# Patient Record
Sex: FEMALE | Race: ASIAN | NOT HISPANIC OR LATINO | Employment: FULL TIME | URBAN - METROPOLITAN AREA
[De-identification: names, ages, dates, MRNs, and addresses within clinical notes are randomized per-mention and may not be internally consistent; named-entity substitution may affect disease eponyms.]

---

## 2017-01-05 ENCOUNTER — OFFICE VISIT (OUTPATIENT)
Dept: MEDICAL GROUP | Facility: PHYSICIAN GROUP | Age: 47
End: 2017-01-05
Payer: COMMERCIAL

## 2017-01-05 VITALS
OXYGEN SATURATION: 96 % | TEMPERATURE: 98.8 F | SYSTOLIC BLOOD PRESSURE: 108 MMHG | RESPIRATION RATE: 14 BRPM | WEIGHT: 189 LBS | HEIGHT: 66 IN | BODY MASS INDEX: 30.37 KG/M2 | HEART RATE: 80 BPM | DIASTOLIC BLOOD PRESSURE: 64 MMHG

## 2017-01-05 DIAGNOSIS — H66.003 ACUTE SUPPURATIVE OTITIS MEDIA OF BOTH EARS WITHOUT SPONTANEOUS RUPTURE OF TYMPANIC MEMBRANES, RECURRENCE NOT SPECIFIED: ICD-10-CM

## 2017-01-05 LAB
FLUAV+FLUBV AG SPEC QL IA: NEGATIVE
INT CON NEG: NEGATIVE
INT CON POS: NEGATIVE

## 2017-01-05 PROCEDURE — 87804 INFLUENZA ASSAY W/OPTIC: CPT | Performed by: NURSE PRACTITIONER

## 2017-01-05 PROCEDURE — 99214 OFFICE O/P EST MOD 30 MIN: CPT | Performed by: NURSE PRACTITIONER

## 2017-01-05 RX ORDER — BENZONATATE 100 MG/1
100 CAPSULE ORAL 3 TIMES DAILY PRN
Qty: 60 CAP | Refills: 0 | Status: SHIPPED | OUTPATIENT
Start: 2017-01-05 | End: 2017-03-29

## 2017-01-05 RX ORDER — CEFDINIR 300 MG/1
300 CAPSULE ORAL 2 TIMES DAILY
Qty: 20 CAP | Refills: 0 | Status: SHIPPED | OUTPATIENT
Start: 2017-01-05 | End: 2017-03-29

## 2017-01-05 ASSESSMENT — ENCOUNTER SYMPTOMS
HEARTBURN: 0
HEMOPTYSIS: 0
NECK PAIN: 0
ABDOMINAL PAIN: 0
CHILLS: 1
DEPRESSION: 0
NAUSEA: 0
ORTHOPNEA: 0
WHEEZING: 0
SWEATS: 1
COUGH: 1
DIAPHORESIS: 0
DIZZINESS: 0
LOSS OF CONSCIOUSNESS: 0
MYALGIAS: 1
FEVER: 1
HEADACHES: 0
SHORTNESS OF BREATH: 1
SPUTUM PRODUCTION: 1
RHINORRHEA: 1
BACK PAIN: 0
STRIDOR: 0
WEAKNESS: 0
CONSTIPATION: 0
VOMITING: 0
TINGLING: 0
PALPITATIONS: 0
DIARRHEA: 0
WEIGHT LOSS: 0
SORE THROAT: 1
BLURRED VISION: 0
TREMORS: 0

## 2017-01-05 ASSESSMENT — COPD QUESTIONNAIRES: COPD: 0

## 2017-01-05 NOTE — MR AVS SNAPSHOT
"        Nikki Aponte   2017 9:20 AM   Office Visit   MRN: [id number]    Department:  [facility]   Dept Phone:  [phone]    Description:  Female : 1970   Provider:  SAVANAH Malagon           Reason for Visit     Cough x 4 days     Otalgia x 2 days       Allergies as of 2017     Allergen Noted Reactions    Codeine 2010   Itching    Penicillins 2014       States that it does not treat anything when she has been on it.       You were diagnosed with     Acute suppurative otitis media of both ears without spontaneous rupture of tympanic membranes, recurrence not specified   [[id number]]         Vital Signs     Blood Pressure Pulse Temperature Respirations Height Weight    108/64 mmHg 80 37.1 °C (98.8 °F) 14 1.676 m (5' 6\") 85.73 kg (189 lb)    Body Mass Index Oxygen Saturation Last Menstrual Period Breastfeeding? Smoking Status       30.52 kg/m2 96% 2016 No Never Smoker        Basic Information     Date Of Birth Sex Race Ethnicity Preferred Language    1970 Female  Non- English      Your appointments     2017  3:20 PM   Established Patient with Ruddy Mansfield M.D.   [facility] (--)    [address]  [address]  [facility] [address] [phone]           You will be receiving a confirmation call a few days before your appointment from our automated call confirmation system.              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.11, D05.12   5/10/2016 - Present    S/P radiation therapy -  [facility] (Chronic) Z92.3   Unknown - Present    History of palpitations in " adulthood Z86.79   12/22/2016 - Present    Bilateral acute suppurative otitis media H66.003   1/5/2017 - Present      Health Maintenance        Date Due Completion Dates    MAMMOGRAM 7/8/2017 7/8/2016, 5/10/2016, 5/10/2016, 4/14/2016, 3/31/2016, 1/10/2014, 1/9/2014, 1/9/2014    PAP SMEAR 3/16/2018 3/16/2015 (Postponed)    Override on 3/16/2015: Postponed    IMM DTaP/Tdap/Td Vaccine (2 - Td) 12/22/2026 12/22/2016            Current Immunizations     Influenza Vaccine Quad Inj (Pf) 12/22/2016    Tdap Vaccine 12/22/2016      Below and/or attached are the medications your provider expects you to take. Review all of your home medications and newly ordered medications with your provider and/or pharmacist. Follow medication instructions as directed by your provider and/or pharmacist. Please keep your medication list with you and share with your provider. Update the information when medications are discontinued, doses are changed, or new medications (including over-the-counter products) are added; and carry medication information at all times in the event of emergency situations     Allergies:  CODEINE - Itching     PENICILLINS - (reactions not documented)               Medications  Valid as of: January 05, 2017 -  9:51 AM    Generic Name Brand Name Tablet Size Instructions for use    Albuterol Sulfate (Aero Soln) albuterol 108 (90 BASE) MCG/ACT Inhale 2 Puffs by mouth every 6 hours as needed for Shortness of Breath.        Benzonatate (Cap) TESSALON 100 MG Take 1 Cap by mouth 3 times a day as needed for Cough.        Cefdinir (Cap) OMNICEF 300 MG Take 1 Cap by mouth 2 times a day.        Copper (IUD) PARAGARD INTRAUTERINE COPPER  by Intrauterine route.        DiphenhydrAMINE HCl (Tab) BENADRYL 25 MG Take 25 mg by mouth as needed for Sleep.        Fluticasone Propionate (Suspension) FLONASE 50 MCG/ACT Spray 2 Sprays in nose every day.        Levothyroxine Sodium (Tab) SYNTHROID 100 MCG Take 1 Tab by mouth Every morning on  an empty stomach.        Sertraline HCl (Tab) ZOLOFT 100 MG Take 1 Tab by mouth every morning.        .                 Medicines prescribed today were sent to:     [facility][id number] - [place] - [address] [place] [address]    Phone: [phone] Fax: [phone]    Open 24 Hours?: No      Medication refill instructions:       If your prescription bottle indicates you have medication refills left, it is not necessary to call your provider’s office. Please contact your pharmacy and they will refill your medication.    If your prescription bottle indicates you do not have any refills left, you may request refills at any time through one of the following ways: The online [employer] system (except Urgent Care), by calling your provider’s office, or by asking your pharmacy to contact your provider’s office with a refill request. Medication refills are processed only during regular business hours and may not be available until the next business day. Your provider may request additional information or to have a follow-up visit with you prior to refilling your medication.   *Please Note: Medication refills are assigned a new Rx number when refilled electronically. Your pharmacy may indicate that no refills were authorized even though a new prescription for the same medication is available at the pharmacy. Please request the medicine by name with the pharmacy before contacting your provider for a refill.        Referral     A referral request has been sent to our patient care coordination department. Please allow 3-5 business days for us to process this request and contact you either by phone or mail. If you do not hear from us by the 5th business day, please call us at [phone].           [employer] Access Code: Activation code not generated  Current [employer] Status: Active

## 2017-01-09 ENCOUNTER — OFFICE VISIT (OUTPATIENT)
Dept: MEDICAL GROUP | Facility: PHYSICIAN GROUP | Age: 47
End: 2017-01-09
Payer: COMMERCIAL

## 2017-01-09 VITALS
HEART RATE: 83 BPM | HEIGHT: 66 IN | SYSTOLIC BLOOD PRESSURE: 142 MMHG | BODY MASS INDEX: 30.22 KG/M2 | RESPIRATION RATE: 16 BRPM | TEMPERATURE: 97.8 F | DIASTOLIC BLOOD PRESSURE: 80 MMHG | WEIGHT: 188 LBS | OXYGEN SATURATION: 93 %

## 2017-01-09 DIAGNOSIS — H66.003 ACUTE SUPPURATIVE OTITIS MEDIA OF BOTH EARS WITHOUT SPONTANEOUS RUPTURE OF TYMPANIC MEMBRANES, RECURRENCE NOT SPECIFIED: ICD-10-CM

## 2017-01-09 PROCEDURE — 99214 OFFICE O/P EST MOD 30 MIN: CPT | Performed by: NURSE PRACTITIONER

## 2017-01-09 NOTE — ASSESSMENT & PLAN NOTE
Improving. Patient states that she is still running a low-grade temperature. Patient states that her ear pain is improved. Patient states that her throat is feeling better at this time. Patient states that she has recently started coughing up some green mucus. Patient does state that she still feels slightly short of breath at times. Patient is taking over-the-counter cough medication. States Tessalon Perles are not working to relieve her cough. Patient is taking Mucinex over-the-counter at this time. Patient has used Flonase twice. Patient counseled the Flonase to use every day to achieve adequate therapeutic effect. She is encouraged to continue using humidifier at night. Rapid influenza is negative at this time. Patient encouraged to continue using albuterol HFA. Patient encouraged to drink plenty of fluids and get a lot of extra rest.

## 2017-01-09 NOTE — PROGRESS NOTES
Chief Complaint   Patient presents with   • Cough     x 1 week    • Fever     x 1 week        HISTORY OF PRESENT ILLNESS: Patient is a 46 y.o. female established patient who presents today to discuss cough.    Bilateral acute suppurative otitis media  Improving. Patient states that she is still running a low-grade temperature. Patient states that her ear pain is improved. Patient states that her throat is feeling better at this time. Patient states that she has recently started coughing up some green mucus. Patient does state that she still feels slightly short of breath at times. Patient is taking over-the-counter cough medication. States Tessalon Perles are not working to relieve her cough. Patient is taking Mucinex over-the-counter at this time. Patient has used Flonase twice. Patient counseled the Flonase to use every day to achieve adequate therapeutic effect. She is encouraged to continue using humidifier at night. Rapid influenza is negative at this time. Patient encouraged to continue using albuterol HFA. Patient encouraged to drink plenty of fluids and get a lot of extra rest.       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 60 mL 0   • cefdinir (OMNICEF) 300 MG Cap Take 1 Cap by mouth 2 times a day. 20 Cap 0   • benzonatate (TESSALON PERLES) 100 MG Cap Take 1 Cap by mouth 3 times  a day as needed for Cough. 60 Cap 0   • sertraline (ZOLOFT) 100 MG Tab Take 1 Tab by mouth every morning. 90 Tab 3   • fluticasone (FLONASE) 50 MCG/ACT nasal spray Spray 2 Sprays in nose every day. 16 g 3   • levothyroxine (SYNTHROID) 100 MCG Tab Take 1 Tab by mouth Every morning on an empty stomach. 90 Tab 1   • albuterol (VENTOLIN OR PROVENTIL) 108 (90 BASE) MCG/ACT AERS Inhale 2 Puffs by mouth every 6 hours as needed for Shortness of Breath. 8.5 g 3   • PARAGARD INTRAUTERINE COPPER IUD by Intrauterine route.     • diphenhydrAMINE (BENADRYL) 25 MG Tab Take 25 mg by mouth as needed for Sleep.       No current facility-administered medications for this visit.       Social History   Substance Use Topics   • Smoking status: Never Smoker    • Smokeless tobacco: Never Used   • Alcohol Use: 0.0 oz/week     0 Standard drinks or equivalent per week      Comment: rare social       No family status information on file.     Family History   Problem Relation Age of Onset   • Stroke Mother    • Hypertension Mother    • Stroke Father    • Hypertension Father    • Cancer Father      prostate cancer   • Diabetes Neg Hx        Review of Systems:   Constitutional:  Positive for fever, chills, negative weight loss and malaise/fatigue.   HEENT:  Positive for ear pain, congestion, sore throat.  Negative  nosebleeds, and neck pain.  Eyes:  Negative for blurred vision.   Respiratory:Positive for cough, sputum production, shortness of breath and wheezing.    Cardiovascular:  Negative for chest pain, palpitations, orthopnea and leg swelling.   Gastrointestinal:  Negative for heartburn, nausea, vomiting and abdominal pain.   Genitourinary:  Negative for dysuria, urgency and frequency.   Musculoskeletal:  Negative for myalgias, back pain and joint pain.   Skin:  Negative for rash and itching.   Neurological:  Negative for dizziness, tingling, tremors, sensory change, focal weakness and headaches.   Endo/Heme/Allergies:  Does not bruise/bleed  "easily.   Psychiatric/Behavioral:  Negative for depression, suicidal ideas and memory loss.  The patient is not nervous/anxious and does not have insomnia.    All other systems reviewed and are negative except as in HPI.    Exam:  Blood pressure 142/80, pulse 83, temperature 36.6 °C (97.8 °F), resp. rate 16, height 1.676 m (5' 6\"), weight 85.276 kg (188 lb), last menstrual period 11/05/2016, SpO2 93 %, not currently breastfeeding.  General:  Normal appearing. No distress.  HEENT:  Normocephalic. Eyes conjunctiva clear lids without ptosis, pupils equal and reactive to light accommodation, ears normal shape and contour, canals are clear bilaterally, tympanic membrane erythematous on right, benign (improved) on left, bilateral effusion present, but not bulging, nasal mucosa erythematous, oropharynx is erythematous without edema or exudates positive cobblestoning.  Neck:  Supple without JVD or bruit. Thyroid is not enlarged.  Pulmonary: Positive wheeze right lower lobe.  Clear to auscultation all other lung fields.Normal effort. No rales, ronchi.  Cardiovascular:  Regular rate and rhythm without murmur. Carotid and radial pulses are intact and equal bilaterally.  Abdomen:  Soft, nontender, nondistended. Normal bowel sounds. Liver and spleen are not palpable.  Neurologic:  Grossly nonfocal  Lymph:  No cervical, supraclavicular or axillary lymph nodes are palpable  Skin:  Warm and dry.  No obvious lesions.  Musculoskeletal:  Normal gait. No extremity cyanosis, clubbing, or edema.  Psych:  Normal mood and affect. Alert and oriented x3. Judgment and insight is normal.      Medical decision-making and discussion: Nikki is a generally well-appearing 38-year-old female patient here today to follow-up as she states her cough is getting worse and not improving. Patient is positive for wheezing on assessment today encouraged to continue use of albuterol inhaler. Ear infection is improving patient encouraged to complete Omnicef. " Patient states the Tessalon Perles are not improving her cough as such, hydrocodone-chlorpheniramine cough syrup provided at this time. Patient encouraged to continue over-the-counter Mucinex, Flonase. Fish encouraged to drink plenty of fluids a healthy diet and use humidifier at night. Patient instructed to use Tylenol or ibuprofen over-the-counter for fever or pain. Patient will follow up if symptoms worsen or do not improve.    Please note that this dictation was created using voice recognition software. I have made every reasonable attempt to correct obvious errors, but I expect that there are errors of grammar and possibly content that I did not discover before finalizing the note.    Assessment/Plan:  1. Acute suppurative otitis media of both ears without spontaneous rupture of tympanic membranes, recurrence not specified            I have placed the below orders and discussed them with an approved delegating provider. The MA is performing the below orders under the direction of Dr. Donahue.

## 2017-02-01 ENCOUNTER — OFFICE VISIT (OUTPATIENT)
Dept: MEDICAL GROUP | Facility: PHYSICIAN GROUP | Age: 47
End: 2017-02-01
Payer: COMMERCIAL

## 2017-02-01 ENCOUNTER — HOSPITAL ENCOUNTER (OUTPATIENT)
Dept: LAB | Facility: MEDICAL CENTER | Age: 47
End: 2017-02-01
Attending: FAMILY MEDICINE
Payer: COMMERCIAL

## 2017-02-01 VITALS
HEIGHT: 66 IN | HEART RATE: 88 BPM | SYSTOLIC BLOOD PRESSURE: 130 MMHG | BODY MASS INDEX: 31.02 KG/M2 | TEMPERATURE: 98.6 F | WEIGHT: 193 LBS | DIASTOLIC BLOOD PRESSURE: 68 MMHG | OXYGEN SATURATION: 97 % | RESPIRATION RATE: 16 BRPM

## 2017-02-01 DIAGNOSIS — R55 PRE-SYNCOPE: ICD-10-CM

## 2017-02-01 DIAGNOSIS — G43.109 MIGRAINE WITH AURA AND WITHOUT STATUS MIGRAINOSUS, NOT INTRACTABLE: ICD-10-CM

## 2017-02-01 DIAGNOSIS — E03.9 HYPOTHYROIDISM, UNSPECIFIED TYPE: ICD-10-CM

## 2017-02-01 LAB
ALBUMIN SERPL BCP-MCNC: 4.6 G/DL (ref 3.2–4.9)
ALBUMIN/GLOB SERPL: 1.6 G/DL
ALP SERPL-CCNC: 68 U/L (ref 30–99)
ALT SERPL-CCNC: 14 U/L (ref 2–50)
ANION GAP SERPL CALC-SCNC: 10 MMOL/L (ref 0–11.9)
AST SERPL-CCNC: 20 U/L (ref 12–45)
BASOPHILS # BLD AUTO: 0.08 K/UL (ref 0–0.12)
BASOPHILS NFR BLD AUTO: 0.9 % (ref 0–1.8)
BILIRUB SERPL-MCNC: 0.4 MG/DL (ref 0.1–1.5)
BUN SERPL-MCNC: 12 MG/DL (ref 8–22)
CALCIUM SERPL-MCNC: 8.8 MG/DL (ref 8.5–10.5)
CHLORIDE SERPL-SCNC: 104 MMOL/L (ref 96–112)
CO2 SERPL-SCNC: 24 MMOL/L (ref 20–33)
CREAT SERPL-MCNC: 0.58 MG/DL (ref 0.5–1.4)
EOSINOPHIL # BLD: 0.37 K/UL (ref 0–0.51)
EOSINOPHIL NFR BLD AUTO: 4.3 % (ref 0–6.9)
ERYTHROCYTE [DISTWIDTH] IN BLOOD BY AUTOMATED COUNT: 44.8 FL (ref 35.9–50)
GLOBULIN SER CALC-MCNC: 2.9 G/DL (ref 1.9–3.5)
GLUCOSE SERPL-MCNC: 83 MG/DL (ref 65–99)
HCT VFR BLD AUTO: 43 % (ref 37–47)
HGB BLD-MCNC: 14 G/DL (ref 12–16)
IMM GRANULOCYTES # BLD AUTO: 0.07 K/UL (ref 0–0.11)
IMM GRANULOCYTES NFR BLD AUTO: 0.8 % (ref 0–0.9)
LYMPHOCYTES # BLD: 2.01 K/UL (ref 1–4.8)
LYMPHOCYTES NFR BLD AUTO: 23.3 % (ref 22–41)
MCH RBC QN AUTO: 30.2 PG (ref 27–33)
MCHC RBC AUTO-ENTMCNC: 32.6 G/DL (ref 33.6–35)
MCV RBC AUTO: 92.7 FL (ref 81.4–97.8)
MONOCYTES # BLD: 0.69 K/UL (ref 0–0.85)
MONOCYTES NFR BLD AUTO: 8 % (ref 0–13.4)
NEUTROPHILS # BLD: 5.4 K/UL (ref 2–7.15)
NEUTROPHILS NFR BLD AUTO: 62.7 % (ref 44–72)
NRBC # BLD AUTO: 0 K/UL
NRBC BLD-RTO: 0 /100 WBC
PLATELET # BLD AUTO: 262 K/UL (ref 164–446)
PMV BLD AUTO: 9.2 FL (ref 9–12.9)
POTASSIUM SERPL-SCNC: 4.1 MMOL/L (ref 3.6–5.5)
PROT SERPL-MCNC: 7.5 G/DL (ref 6–8.2)
RBC # BLD AUTO: 4.64 M/UL (ref 4.2–5.4)
SODIUM SERPL-SCNC: 138 MMOL/L (ref 135–145)
T4 FREE SERPL-MCNC: 0.92 NG/DL (ref 0.53–1.43)
TSH SERPL DL<=0.005 MIU/L-ACNC: 0.77 UIU/ML (ref 0.3–3.7)
WBC # BLD AUTO: 8.6 K/UL (ref 4.8–10.8)

## 2017-02-01 PROCEDURE — 99214 OFFICE O/P EST MOD 30 MIN: CPT | Performed by: FAMILY MEDICINE

## 2017-02-01 PROCEDURE — 84443 ASSAY THYROID STIM HORMONE: CPT

## 2017-02-01 PROCEDURE — 36415 COLL VENOUS BLD VENIPUNCTURE: CPT

## 2017-02-01 PROCEDURE — 85025 COMPLETE CBC W/AUTO DIFF WBC: CPT

## 2017-02-01 PROCEDURE — 80053 COMPREHEN METABOLIC PANEL: CPT

## 2017-02-01 PROCEDURE — 84439 ASSAY OF FREE THYROXINE: CPT

## 2017-02-01 ASSESSMENT — ENCOUNTER SYMPTOMS: HEADACHES: 1

## 2017-02-01 NOTE — PROGRESS NOTES
Subjective:      Nikki Aponte is a 46 y.o. female who presents with Headache            Headache       This is a 46-year-old female previously under the care of Dr. Mcqueen and then by Dr. Mansfield.    She is here because she had 2 episodes of migraine headache that happened while working out 2 months ago and then yesterday. She was doing high impact workout with jumping both times. She said she started having neck pain and then she developed flashes of light and pain around her sinuses in the jaw. These are the same symptoms that she had when she had migraine episodes in 2009. At that time the migraine headaches were coming on a daily basis for 3 weeks. She said she was seen and evaluated by neurologist Dr. Koehler at the [facility]. She said she was given a medication but she couldn't remember the name. The headache did not recur until recently. The only difference this time from the headache that she had before was the feeling that she is going to faint. She did not use consciousness completely. The first episode 2 months ago lasted about 15 minutes and yesterday lasted about 30 minutes. She did not take any medication. In the past when she had migraine headache also having slurring of speech which did not happen this time.    I reviewed medical records and she was recently evaluated by the cardiologist for shortness of breath and underwent treadmill stress test which came back normal.     She however has history of mild to moderate aortic insufficiency with the last echocardiogram in 9/14.    She recently was diagnosed with right breast cancer in May 2016 status post partial mastectomy and radiation treatment.    She has hypothyroidism and her levothyroxine dose was adjusted and was decreased about 6 weeks ago because she was slightly over replaced.    Past medical history, past surgical history, family history reviewed-no changes    Social history reviewed-no changes    Allergies reviewed-no  "changes    Medications reviewed-no changes     Review of Systems   Neurological: Positive for headaches.    additionally as per history of present illness, the rest are negative.       Objective:     /68 mmHg  Pulse 88  Temp(Src) 37 °C (98.6 °F)  Resp 16  Ht 1.676 m (5' 6\")  Wt 87.544 kg (193 lb)  BMI 31.17 kg/m2  SpO2 97%  LMP 11/05/2016     Physical Exam     Examined alert, awake, oriented, not in distress    Neck-supple, no lymphadenopathy, no thyromegaly, no bruits, positive lesion of the systolic murmur in the left carotid  Lungs-clear to auscultation, no rales, no wheezes  Heart-regular rate and rhythm, positive systolic murmur grade 2/6 left sternal border  Extremities-no edema, clubbing, cyanosis  Neuro exam-neurologically intact            Assessment/Plan:     1. Migraine with aura and without status migrainosus, not intractable  She had 2 episodes of migraine headache 2 months ago and then yesterday. She does not want to take medication at this time since the 2 episodes were far apart. If there is increase in the frequency we will institute treatment for abortive treatment and possibly prophylactic treatment.  - COMP METABOLIC PANEL; Future  - TSH; Future  - FREE THYROXINE; Future  - CBC WITH DIFFERENTIAL; Future    2. Pre-syncope  She has a syncope associated with a migraine episodes. I will get carotid ultrasound, and because of her history of aortic insufficiency I will get an updated echocardiogram. I will also get a CAT scan of the head without contrast. I will also check CBC, CMP to rule out metabolic or hematologic problems. We will follow-up her thyroid function tests because her levothyroxine dose was just adjusted 6 weeks ago.  - US-CAROTID DOPPLER; Future  - ECHOCARDIOGRAM COMP W/O CONT; Future  - COMP METABOLIC PANEL; Future  - TSH; Future  - FREE THYROXINE; Future  - CBC WITH DIFFERENTIAL; Future  - CT-HEAD W/O; Future    3. Hypothyroidism, unspecified type  We will get a " follow-up thyroid function test to see if this is already adequately replaced.  - COMP METABOLIC PANEL; Future  - TSH; Future  - FREE THYROXINE; Future  - CBC WITH DIFFERENTIAL; Future        Please note that this dictation was created using voice recognition software. I have worked with consultants from the vendor as well as technical experts from [facility] to optimize the interface. I have made every reasonable attempt to correct obvious errors, but I expect that there are errors of grammar and possibly content I did not discover before finalizing the note.

## 2017-02-01 NOTE — MR AVS SNAPSHOT
"        Nikki Aponte   2017 9:40 AM   Office Visit   MRN: [id number]    Department:  [facility]   Dept Phone:  [phone]    Description:  Female : 1970   Provider:  Natalie Donahue M.D.           Reason for Visit     Headache really bad and last 30 min      Allergies as of 2017     Allergen Noted Reactions    Codeine 2010   Itching    Penicillins 2014       States that it does not treat anything when she has been on it.       You were diagnosed with     Migraine with aura and without status migrainosus, not intractable   [521875]       Pre-syncope   [149798]       Hypothyroidism, unspecified type   [9528635]         Vital Signs     Blood Pressure Pulse Temperature Respirations Height Weight    130/68 mmHg 88 37 °C (98.6 °F) 16 1.676 m (5' 6\") 87.544 kg (193 lb)    Body Mass Index Oxygen Saturation Last Menstrual Period Smoking Status          31.17 kg/m2 97% 2016 Never Smoker         Basic Information     Date Of Birth Sex Race Ethnicity Preferred Language    1970 Female  Non- English      Your appointments     May 04, 2017  2:40 PM   Established Patient with Natalie Donahue M.D.   [facility] [employer] (--)    [email or web address]  [address]  [facility] [address] [phone]           You will be receiving a confirmation call a few days before your appointment from our automated call confirmation system.            2017  3:20 PM   Established Patient with Ruddy Mansfield M.D.   [facility] [employer] (--)    [email or web address]  [address]  [place] [address] [phone]           You will be receiving a confirmation call a few days before your appointment from our automated call confirmation system.              Problem List              ICD-10-CM Priority Class Noted - Resolved    Hypothyroidism E03.9   2013 - Present    Depression F32.9   2013 - Present    History of Hodgkin's disease Z85.71   2013 - Present    Nonrheumatic aortic valve " insufficiency I35.1   5/8/2014 - Present    Allergic rhinitis due to allergen J30.9   5/8/2014 - Present    Bilateral ductal carcinoma in situ of breasts D05.11, D05.12   5/10/2016 - Present    S/P radiation therapy - 1990 Mantle Radiation (Chronic) Z92.3   Unknown - Present    History of palpitations in adulthood Z86.79   12/22/2016 - Present    Bilateral acute suppurative otitis media H66.003   1/5/2017 - Present      Health Maintenance        Date Due Completion Dates    MAMMOGRAM 4/14/2017 4/14/2016, 3/31/2016, 1/10/2014, 1/9/2014, 1/9/2014    PAP SMEAR 3/16/2018 3/16/2015 (Postponed)    Override on 3/16/2015: Postponed    IMM DTaP/Tdap/Td Vaccine (2 - Td) 12/22/2026 12/22/2016            Current Immunizations     Influenza Vaccine Quad Inj (Pf) 12/22/2016    Tdap Vaccine 12/22/2016      Below and/or attached are the medications your provider expects you to take. Review all of your home medications and newly ordered medications with your provider and/or pharmacist. Follow medication instructions as directed by your provider and/or pharmacist. Please keep your medication list with you and share with your provider. Update the information when medications are discontinued, doses are changed, or new medications (including over-the-counter products) are added; and carry medication information at all times in the event of emergency situations     Allergies:  CODEINE - Itching     PENICILLINS - (reactions not documented)               Medications  Valid as of: February 01, 2017 - 10:12 AM    Generic Name Brand Name Tablet Size Instructions for use    Albuterol Sulfate (Aero Soln) albuterol 108 (90 BASE) MCG/ACT Inhale 2 Puffs by mouth every 6 hours as needed for Shortness of Breath.        Benzonatate (Cap) TESSALON 100 MG Take 1 Cap by mouth 3 times a day as needed for Cough.        Cefdinir (Cap) OMNICEF 300 MG Take 1 Cap by mouth 2 times a day.        Copper (IUD) PARAGARD INTRAUTERINE COPPER  by Intrauterine route.         DiphenhydrAMINE HCl (Tab) BENADRYL 25 MG Take 25 mg by mouth as needed for Sleep.        Fluticasone Propionate (Suspension) FLONASE 50 MCG/ACT Spray 2 Sprays in nose every day.        Hydrocodone-Chlorpheniramine (Solution) Hydrocodone-Chlorpheniramine 5-4 MG/5ML Take 5 mL by mouth 2 times a day as needed.        Levothyroxine Sodium (Tab) SYNTHROID 100 MCG Take 1 Tab by mouth Every morning on an empty stomach.        Sertraline HCl (Tab) ZOLOFT 100 MG Take 1 Tab by mouth every morning.        .                 Medicines prescribed today were sent to:     [facility] #[id number] - [place], [place] - [address] [place] [address]    Phone: [phone] Fax: [phone]    Open 24 Hours?: No      Medication refill instructions:       If your prescription bottle indicates you have medication refills left, it is not necessary to call your provider’s office. Please contact your pharmacy and they will refill your medication.    If your prescription bottle indicates you do not have any refills left, you may request refills at any time through one of the following ways: The online [employer] system (except Urgent Care), by calling your provider’s office, or by asking your pharmacy to contact your provider’s office with a refill request. Medication refills are processed only during regular business hours and may not be available until the next business day. Your provider may request additional information or to have a follow-up visit with you prior to refilling your medication.   *Please Note: Medication refills are assigned a new Rx number when refilled electronically. Your pharmacy may indicate that no refills were authorized even though a new prescription for the same medication is available at the pharmacy. Please request the medicine by name with the pharmacy before contacting your provider for a refill.        Your To Do List     Future Labs/Procedures Complete By Expires    CBC WITH DIFFERENTIAL  As directed  2/2/2018    COMP METABOLIC PANEL  As directed 2/2/2018    CT-HEAD W/O  As directed 2/1/2018    ECHOCARDIOGRAM COMP W/O CONT  As directed 2/1/2018    Scheduling Instructions:    Presyncope and follow up aortic insuff    FREE THYROXINE  As directed 2/2/2018    TSH  As directed 2/2/2018    US-CAROTID DOPPLER  As directed 2/1/2018         MyChart Access Code: Activation code not generated  Current MyChart Status: Active

## 2017-02-21 ENCOUNTER — HOSPITAL ENCOUNTER (OUTPATIENT)
Dept: CARDIOLOGY | Facility: MEDICAL CENTER | Age: 47
End: 2017-02-21
Attending: FAMILY MEDICINE | Admitting: RADIOLOGY
Payer: COMMERCIAL

## 2017-02-21 ENCOUNTER — HOSPITAL ENCOUNTER (OUTPATIENT)
Dept: RADIOLOGY | Facility: MEDICAL CENTER | Age: 47
End: 2017-02-21
Attending: FAMILY MEDICINE
Payer: COMMERCIAL

## 2017-02-21 DIAGNOSIS — I35.1 MODERATE AORTIC INSUFFICIENCY: ICD-10-CM

## 2017-02-21 DIAGNOSIS — R55 PRE-SYNCOPE: ICD-10-CM

## 2017-02-21 LAB
LV EJECT FRACT MOD 2C 99903: 62.07
LV EJECT FRACT MOD 4C 99902: 56.42
LV EJECT FRACT MOD BP 99901: 58.99

## 2017-02-21 PROCEDURE — 93306 TTE W/DOPPLER COMPLETE: CPT | Mod: 26 | Performed by: INTERNAL MEDICINE

## 2017-02-21 PROCEDURE — 93306 TTE W/DOPPLER COMPLETE: CPT

## 2017-02-21 PROCEDURE — 70450 CT HEAD/BRAIN W/O DYE: CPT

## 2017-02-23 ENCOUNTER — HOSPITAL ENCOUNTER (OUTPATIENT)
Dept: RADIOLOGY | Facility: MEDICAL CENTER | Age: 47
End: 2017-02-23
Attending: FAMILY MEDICINE
Payer: COMMERCIAL

## 2017-02-23 DIAGNOSIS — R55 PRE-SYNCOPE: ICD-10-CM

## 2017-02-23 PROCEDURE — 93880 EXTRACRANIAL BILAT STUDY: CPT

## 2017-02-26 DIAGNOSIS — I65.23 CAROTID ARTERY PLAQUE, BILATERAL: ICD-10-CM

## 2017-03-29 DIAGNOSIS — Z01.810 PRE-OPERATIVE CARDIOVASCULAR EXAMINATION: ICD-10-CM

## 2017-03-29 DIAGNOSIS — Z01.812 PRE-OPERATIVE LABORATORY EXAMINATION: ICD-10-CM

## 2017-03-29 LAB
EKG IMPRESSION: NORMAL
ERYTHROCYTE [DISTWIDTH] IN BLOOD BY AUTOMATED COUNT: 42.1 FL (ref 35.9–50)
HCG SERPL QL: NEGATIVE
HCT VFR BLD AUTO: 42.4 % (ref 37–47)
HGB BLD-MCNC: 14.2 G/DL (ref 12–16)
MCH RBC QN AUTO: 30.4 PG (ref 27–33)
MCHC RBC AUTO-ENTMCNC: 33.5 G/DL (ref 33.6–35)
MCV RBC AUTO: 90.8 FL (ref 81.4–97.8)
PLATELET # BLD AUTO: 227 K/UL (ref 164–446)
PMV BLD AUTO: 9 FL (ref 9–12.9)
RBC # BLD AUTO: 4.67 M/UL (ref 4.2–5.4)
WBC # BLD AUTO: 9.9 K/UL (ref 4.8–10.8)

## 2017-03-29 PROCEDURE — 84703 CHORIONIC GONADOTROPIN ASSAY: CPT

## 2017-03-29 PROCEDURE — 85027 COMPLETE CBC AUTOMATED: CPT

## 2017-03-29 PROCEDURE — 36415 COLL VENOUS BLD VENIPUNCTURE: CPT

## 2017-03-29 NOTE — OR NURSING
Pre admit apt: Pt. Instructed to continue regularly prescribed medications through day before surgery.  Instructed to take the following medications, the day of surgery, with a sip of water per anesthesia protocol:albuterol inhaler

## 2017-03-31 ENCOUNTER — HOSPITAL ENCOUNTER (OUTPATIENT)
Facility: MEDICAL CENTER | Age: 47
End: 2017-03-31
Attending: ORTHOPAEDIC SURGERY | Admitting: ORTHOPAEDIC SURGERY
Payer: COMMERCIAL

## 2017-03-31 VITALS
OXYGEN SATURATION: 95 % | BODY MASS INDEX: 31.38 KG/M2 | WEIGHT: 199.96 LBS | HEIGHT: 67 IN | HEART RATE: 74 BPM | RESPIRATION RATE: 16 BRPM | SYSTOLIC BLOOD PRESSURE: 148 MMHG | TEMPERATURE: 97.2 F | DIASTOLIC BLOOD PRESSURE: 74 MMHG

## 2017-03-31 PROBLEM — G56.02 CARPAL TUNNEL SYNDROME ON LEFT: Status: ACTIVE | Noted: 2017-03-31

## 2017-03-31 LAB
HCG UR QL: NEGATIVE
SP GR UR REFRACTOMETRY: 1.02

## 2017-03-31 PROCEDURE — A9270 NON-COVERED ITEM OR SERVICE: HCPCS | Performed by: ORTHOPAEDIC SURGERY

## 2017-03-31 PROCEDURE — 500440 HCHG DRESSING, STERILE ROLL (KERLIX): Performed by: ORTHOPAEDIC SURGERY

## 2017-03-31 PROCEDURE — 700111 HCHG RX REV CODE 636 W/ 250 OVERRIDE (IP): Performed by: ORTHOPAEDIC SURGERY

## 2017-03-31 PROCEDURE — 700111 HCHG RX REV CODE 636 W/ 250 OVERRIDE (IP)

## 2017-03-31 PROCEDURE — 160048 HCHG OR STATISTICAL LEVEL 1-5: Performed by: ORTHOPAEDIC SURGERY

## 2017-03-31 PROCEDURE — 110371 HCHG SHELL REV 272: Performed by: ORTHOPAEDIC SURGERY

## 2017-03-31 PROCEDURE — 160002 HCHG RECOVERY MINUTES (STAT): Performed by: ORTHOPAEDIC SURGERY

## 2017-03-31 PROCEDURE — 700101 HCHG RX REV CODE 250

## 2017-03-31 PROCEDURE — 500881 HCHG PACK, EXTREMITY: Performed by: ORTHOPAEDIC SURGERY

## 2017-03-31 PROCEDURE — 160009 HCHG ANES TIME/MIN: Performed by: ORTHOPAEDIC SURGERY

## 2017-03-31 PROCEDURE — 81025 URINE PREGNANCY TEST: CPT

## 2017-03-31 PROCEDURE — 500433 HCHG DRESSING, KLING 4': Performed by: ORTHOPAEDIC SURGERY

## 2017-03-31 PROCEDURE — 160035 HCHG PACU - 1ST 60 MINS PHASE I: Performed by: ORTHOPAEDIC SURGERY

## 2017-03-31 PROCEDURE — 160025 RECOVERY II MINUTES (STATS): Performed by: ORTHOPAEDIC SURGERY

## 2017-03-31 PROCEDURE — A6222 GAUZE <=16 IN NO W/SAL W/O B: HCPCS | Performed by: ORTHOPAEDIC SURGERY

## 2017-03-31 PROCEDURE — 501838 HCHG SUTURE GENERAL: Performed by: ORTHOPAEDIC SURGERY

## 2017-03-31 PROCEDURE — 700102 HCHG RX REV CODE 250 W/ 637 OVERRIDE(OP): Performed by: ORTHOPAEDIC SURGERY

## 2017-03-31 PROCEDURE — 160047 HCHG PACU  - EA ADDL 30 MINS PHASE II: Performed by: ORTHOPAEDIC SURGERY

## 2017-03-31 PROCEDURE — 160046 HCHG PACU - 1ST 60 MINS PHASE II: Performed by: ORTHOPAEDIC SURGERY

## 2017-03-31 PROCEDURE — 500053 HCHG BANDAGE, ELASTIC 4: Performed by: ORTHOPAEDIC SURGERY

## 2017-03-31 PROCEDURE — 160028 HCHG SURGERY MINUTES - 1ST 30 MINS LEVEL 3: Performed by: ORTHOPAEDIC SURGERY

## 2017-03-31 PROCEDURE — 500080: Performed by: ORTHOPAEDIC SURGERY

## 2017-03-31 RX ORDER — ONDANSETRON 2 MG/ML
4 INJECTION INTRAMUSCULAR; INTRAVENOUS EVERY 4 HOURS PRN
Status: DISCONTINUED | OUTPATIENT
Start: 2017-03-31 | End: 2017-03-31 | Stop reason: HOSPADM

## 2017-03-31 RX ORDER — LIDOCAINE HYDROCHLORIDE 10 MG/ML
INJECTION, SOLUTION INFILTRATION; PERINEURAL
Status: COMPLETED
Start: 2017-03-31 | End: 2017-03-31

## 2017-03-31 RX ORDER — OXYCODONE HYDROCHLORIDE 5 MG/1
2.5 TABLET ORAL
Status: DISCONTINUED | OUTPATIENT
Start: 2017-03-31 | End: 2017-03-31 | Stop reason: HOSPADM

## 2017-03-31 RX ORDER — DEXAMETHASONE SODIUM PHOSPHATE 4 MG/ML
4 INJECTION, SOLUTION INTRA-ARTICULAR; INTRALESIONAL; INTRAMUSCULAR; INTRAVENOUS; SOFT TISSUE
Status: DISCONTINUED | OUTPATIENT
Start: 2017-03-31 | End: 2017-03-31 | Stop reason: HOSPADM

## 2017-03-31 RX ORDER — DIPHENHYDRAMINE HYDROCHLORIDE 50 MG/ML
25 INJECTION INTRAMUSCULAR; INTRAVENOUS EVERY 6 HOURS PRN
Status: DISCONTINUED | OUTPATIENT
Start: 2017-03-31 | End: 2017-03-31 | Stop reason: HOSPADM

## 2017-03-31 RX ORDER — SODIUM CHLORIDE, SODIUM LACTATE, POTASSIUM CHLORIDE, CALCIUM CHLORIDE 600; 310; 30; 20 MG/100ML; MG/100ML; MG/100ML; MG/100ML
1000 INJECTION, SOLUTION INTRAVENOUS
Status: DISCONTINUED | OUTPATIENT
Start: 2017-03-31 | End: 2017-03-31 | Stop reason: HOSPADM

## 2017-03-31 RX ORDER — HALOPERIDOL 5 MG/ML
1 INJECTION INTRAMUSCULAR EVERY 6 HOURS PRN
Status: DISCONTINUED | OUTPATIENT
Start: 2017-03-31 | End: 2017-03-31 | Stop reason: HOSPADM

## 2017-03-31 RX ORDER — BUPIVACAINE HYDROCHLORIDE 5 MG/ML
INJECTION, SOLUTION EPIDURAL; INTRACAUDAL
Status: DISCONTINUED | OUTPATIENT
Start: 2017-03-31 | End: 2017-03-31 | Stop reason: HOSPADM

## 2017-03-31 RX ADMIN — SODIUM CHLORIDE, POTASSIUM CHLORIDE, SODIUM LACTATE AND CALCIUM CHLORIDE 1000 ML: 600; 310; 30; 20 INJECTION, SOLUTION INTRAVENOUS at 08:40

## 2017-03-31 RX ADMIN — LIDOCAINE HYDROCHLORIDE 0.2 ML: 10 INJECTION, SOLUTION INFILTRATION; PERINEURAL at 08:40

## 2017-03-31 ASSESSMENT — PAIN SCALES - GENERAL
PAINLEVEL_OUTOF10: 0

## 2017-03-31 NOTE — OP REPORT
DATE OF SERVICE:  03/31/2017    PREOPERATIVE DIAGNOSIS:  Carpal tunnel syndrome, left hand.    POSTOPERATIVE DIAGNOSIS:  Carpal tunnel syndrome, left hand.    OPERATION:  Release, left transverse carpal ligament.    SURGEON:  Tracy Powers MD    ANESTHESIA:  General.    SUMMARY:  Under satisfactory general anesthesia, the patient's left hand was   prepped and draped in sterile fashion.  Tourniquet was applied and inflated   after exsanguination at 250 mmHg.  A mid palmar incision was made.  Dissection   down to the distal transverse carpal ligament was performed.  The ligament   was incised _____ the distal 0.5 cm and the [employer] system was brought   into play.  Sounding of the rest of the ligament was performed followed by   release of the ligament _____ inspection of the median nerve revealed to be in   good shape.  The wound was injected with 0.5% Marcaine plain and irrigated   and closed with 3-0 nylon mattress interrupted sutures.  Dressings were   applied and the patient was returned to the recovery room in good condition.    There were no complications.       ____________________________________     TRACY POWERS MD    NBY / NTS    DD:  03/31/2017 09:47:09  DT:  03/31/2017 10:44:14    D#:  [id number]  Job#:  [id number]

## 2017-03-31 NOTE — OR NURSING
1040 patient to stage 2  Patient settled in recliner chair post short ambulation from [facility] dressed with assist by CNA. L hand dressing CDI with pink/warm fingers and <3 sec cap refill. Denies pain and denies.

## 2017-03-31 NOTE — DISCHARGE INSTRUCTIONS
ACTIVITY: Rest and take it easy for the first 24 hours.  A responsible adult is recommended to remain with you during that time.  It is normal to feel sleepy.  We encourage you to not do anything that requires balance, judgment or coordination.    MILD FLU-LIKE SYMPTOMS ARE NORMAL. YOU MAY EXPERIENCE GENERALIZED MUSCLE ACHES, THROAT IRRITATION, HEADACHE AND/OR SOME NAUSEA.    FOR 24 HOURS DO NOT:  Drive, operate machinery or run household appliances.  Drink beer or alcoholic beverages.   Make important decisions or sign legal documents.    SPECIAL INSTRUCTIONS: May use operative hand as tolerated. Elevate and may use ice 20 minutes on/20 minutes off but NO ICE TO FINGERS    DIET: To avoid nausea, slowly advance diet as tolerated, avoiding spicy or greasy foods for the first day.  Add more substantial food to your diet according to your physician's instructions. INCREASE FLUIDS AND FIBER TO AVOID CONSTIPATION.    SURGICAL DRESSING/BATHING: Keep dressing clean, dry and intact until instructed otherwise by surgeon. May shower with incisions covered starting Monday.     FOLLOW-UP APPOINTMENT:  A follow-up appointment should be arranged with your doctor in office as instructed ; call to schedule.    You should CALL YOUR PHYSICIAN if you develop:  Fever greater than 101 degrees F.  Pain not relieved by medication, or persistent nausea or vomiting.  Excessive bleeding (blood soaking through dressing) or unexpected drainage from the wound.  Extreme redness or swelling around the incision site, drainage of pus or foul smelling drainage.  Inability to urinate or empty your bladder within 8 hours.  Problems with breathing or chest pain.    You should call 911 if you develop problems with breathing or chest pain.  If you are unable to contact your doctor or surgical center, you should go to the nearest emergency room or urgent care center.  Physician's telephone #: [phone]    If any questions arise, call your doctor.  If  your doctor is not available, please feel free to call the Surgical Center at [phone].  The Center is open Monday through Friday from 7AM to 7PM.  You can also call the HEALTH HOTLINE open 24 hours/day, 7 days/week and speak to a nurse at [phone], or toll free at [phone].    A registered nurse may call you a few days after your surgery to see how you are doing after your procedure.    MEDICATIONS: Resume taking daily medication.  Take prescribed pain medication with food.  If no medication is prescribed, you may take non-aspirin pain medication if needed.  PAIN MEDICATION CAN BE VERY CONSTIPATING.  Take a stool softener or laxative such as senokot, pericolace, or milk of magnesia if needed.    Prescription given for preoperatively.  Last pain medication given at n/a    If your physician has prescribed pain medication that includes Acetaminophen (Tylenol), do not take additional Acetaminophen (Tylenol) while taking the prescribed medication.    Depression / Suicide Risk    As you are discharged from this [facility] facility, it is important to learn how to keep safe from harming yourself.    Recognize the warning signs:  · Abrupt changes in personality, positive or negative- including increase in energy   · Giving away possessions  · Change in eating patterns- significant weight changes-  positive or negative  · Change in sleeping patterns- unable to sleep or sleeping all the time   · Unwillingness or inability to communicate  · Depression  · Unusual sadness, discouragement and loneliness  · Talk of wanting to die  · Neglect of personal appearance   · Rebelliousness- reckless behavior  · Withdrawal from people/activities they love  · Confusion- inability to concentrate     If you or a loved one observes any of these behaviors or has concerns about self-harm, here's what you can do:  · Talk about it- your feelings and reasons for harming yourself  · Remove any means that you might use to hurt  yourself (examples: pills, rope, extension cords, firearm)  · Get professional help from the community (Mental Health, Substance Abuse, psychological counseling)  · Do not be alone:Call your Safe Contact- someone whom you trust who will be there for you.  · Call your local CRISIS HOTLINE 500-6964 or 191-324-4180  · Call your local Children's Mobile Crisis Response Team [place] (606) 933-2992 or www.[employer]  · Call the toll free National Suicide Prevention Hotlines   · National Suicide Prevention Lifeline 418-410-UMHR (6626)  · National Hope Line Network 800-SUICIDE (462-1291)

## 2017-03-31 NOTE — OR NURSING
Pt to pre-op. Name, birthday, allergies, NPO status, last void, medication rec, surgical site and procedure verified with patient.  Pt belongings collected and secured in locker.  Pt verbalizes understanding of pain scale, expected plan of care and course of stay.  IV established. Sequentials placed.  Family at bedside.

## 2017-03-31 NOTE — IP AVS SNAPSHOT
3/31/2017          Nikki Aponte  [address] [place] [address]    Dear Nikki:    [facility] wants to ensure your discharge home is safe and you or your loved ones have had all your questions answered regarding your care after you leave the hospital.    You may receive a telephone call within two days of your discharge.  This call is to make certain you understand your discharge instructions as well as ensure we provided you with the best care possible during your stay with us.     The call will only last approximately 3-5 minutes and will be done by a nurse.    Once again, we want to ensure your discharge home is safe and that you have a clear understanding of any next steps in your care.  If you have any questions or concerns, please do not hesitate to contact us, we are here for you.  Thank you for choosing [facility] for your healthcare needs.    Sincerely,    Wayne Nixon    [facility]

## 2017-03-31 NOTE — OR SURGEON
Immediate Post-Operative Note      PreOp Diagnosis: left carpal tunnel  PostOp Diagnosis: same    Procedure(s):  CARPAL TUNNEL RELEASE - Wound Class: Clean    Surgeon(s):  Shay Powers M.D.    Anesthesiologist/Type of Anesthesia:  Anesthesiologist: Camacho Fragoso M.D./General    Surgical Staff:  Circulator: Belen Aponte R.N.; Mona Felix R.N.  Scrub Person: Jad Garcia    Specimen: 0    Estimated Blood Loss: 0    Findings: 0    Complications: 0        3/31/2017 9:47 AM Shay Powers

## 2017-03-31 NOTE — OR NURSING
0941 To PACU from OR via gurney, sleeping, respirations spontaneous and non-labored via LMA. Dressing to left arm c/d/i. Cap refill to operative extremity <3 seconds. Pts left hand pink and warm  0951 LMA dc'd at this time. Pt denies pain and nausea. Assessment unchanged   1010 Assessment unchanged.   1015 Meets criteria to transfer to Stage 2. Denies pain and nausea at this time. Dressing to left hand c/d/i

## 2017-03-31 NOTE — IP AVS SNAPSHOT
" Home Care Instructions                                                                                                                Name:Nikki Aponte  Medical Record Number:[id number]  CSN: [id number]    YOB: 1970   Age: 46 y.o.  Sex: female  HT:1.702 m (5' 7.01\") WT: 90.7 kg (199 lb 15.3 oz)          Admit Date: 3/31/2017     Discharge Date:   Today's Date: 3/31/2017  Attending Doctor:  Shay Powers M.D.                  Allergies:  Codeine and Penicillins                Discharge Instructions         ACTIVITY: Rest and take it easy for the first 24 hours.  A responsible adult is recommended to remain with you during that time.  It is normal to feel sleepy.  We encourage you to not do anything that requires balance, judgment or coordination.    MILD FLU-LIKE SYMPTOMS ARE NORMAL. YOU MAY EXPERIENCE GENERALIZED MUSCLE ACHES, THROAT IRRITATION, HEADACHE AND/OR SOME NAUSEA.    FOR 24 HOURS DO NOT:  Drive, operate machinery or run household appliances.  Drink beer or alcoholic beverages.   Make important decisions or sign legal documents.    SPECIAL INSTRUCTIONS: May use operative hand as tolerated. Elevate and may use ice 20 minutes on/20 minutes off but NO ICE TO FINGERS    DIET: To avoid nausea, slowly advance diet as tolerated, avoiding spicy or greasy foods for the first day.  Add more substantial food to your diet according to your physician's instructions. INCREASE FLUIDS AND FIBER TO AVOID CONSTIPATION.    SURGICAL DRESSING/BATHING: Keep dressing clean, dry and intact until instructed otherwise by surgeon. May shower with incisions covered starting Monday.     FOLLOW-UP APPOINTMENT:  A follow-up appointment should be arranged with your doctor in office as instructed ; call to schedule.    You should CALL YOUR PHYSICIAN if you develop:  Fever greater than 101 degrees F.  Pain not relieved by medication, or persistent nausea or vomiting.  Excessive bleeding (blood soaking through dressing) or " unexpected drainage from the wound.  Extreme redness or swelling around the incision site, drainage of pus or foul smelling drainage.  Inability to urinate or empty your bladder within 8 hours.  Problems with breathing or chest pain.    You should call 911 if you develop problems with breathing or chest pain.  If you are unable to contact your doctor or surgical center, you should go to the nearest emergency room or urgent care center.  Physician's telephone #: [phone]    If any questions arise, call your doctor.  If your doctor is not available, please feel free to call the Surgical Center at [phone].  The Center is open Monday through Friday from 7AM to 7PM.  You can also call the [place] HOTLINE open 24 hours/day, 7 days/week and speak to a nurse at [phone], or toll free at [phone].    A registered nurse may call you a few days after your surgery to see how you are doing after your procedure.    MEDICATIONS: Resume taking daily medication.  Take prescribed pain medication with food.  If no medication is prescribed, you may take non-aspirin pain medication if needed.  PAIN MEDICATION CAN BE VERY CONSTIPATING.  Take a stool softener or laxative such as senokot, pericolace, or milk of magnesia if needed.    Prescription given for preoperatively.  Last pain medication given at n/a    If your physician has prescribed pain medication that includes Acetaminophen (Tylenol), do not take additional Acetaminophen (Tylenol) while taking the prescribed medication.    Depression / Suicide Risk    As you are discharged from this [facility] facility, it is important to learn how to keep safe from harming yourself.    Recognize the warning signs:  · Abrupt changes in personality, positive or negative- including increase in energy   · Giving away possessions  · Change in eating patterns- significant weight changes-  positive or negative  · Change in sleeping patterns- unable to sleep or sleeping all the  time   · Unwillingness or inability to communicate  · Depression  · Unusual sadness, discouragement and loneliness  · Talk of wanting to die  · Neglect of personal appearance   · Rebelliousness- reckless behavior  · Withdrawal from people/activities they love  · Confusion- inability to concentrate     If you or a loved one observes any of these behaviors or has concerns about self-harm, here's what you can do:  · Talk about it- your feelings and reasons for harming yourself  · Remove any means that you might use to hurt yourself (examples: pills, rope, extension cords, firearm)  · Get professional help from the community (Mental Health, Substance Abuse, psychological counseling)  · Do not be alone:Call your Safe Contact- someone whom you trust who will be there for you.  · Call your local CRISIS HOTLINE 423-4063 or 058-910-9932  · Call your local Children's Mobile Crisis Response Team [place] (571) 803-0206 or www.[employer]  · Call the toll free National Suicide Prevention Hotlines   · National Suicide Prevention Lifeline 204-714-IMJW (6917)  · National Hope Line Network 800-SUICIDE (082-6996)       Medication List      CONTINUE taking these medications        Instructions    albuterol 108 (90 BASE) MCG/ACT Aers inhalation aerosol    Inhale 2 Puffs by mouth every 6 hours as needed for Shortness of Breath.   Dose:  2 Puff       levothyroxine 100 MCG Tabs   Commonly known as:  SYNTHROID    Take 1 Tab by mouth Every morning on an empty stomach.   Dose:  100 mcg       PARAGARD INTRAUTERINE COPPER Iud    by Intrauterine route.       sertraline 100 MG Tabs   Commonly known as:  ZOLOFT    Take 1 Tab by mouth every morning.   Dose:  100 mg               Medication Information     Above and/or attached are the medications your physician expects you to take upon discharge. Review all of your home medications and newly ordered medications with your doctor and/or pharmacist. Follow medication instructions as  directed by your doctor and/or pharmacist. Please keep your medication list with you and share with your physician. Update the information when medications are discontinued, doses are changed, or new medications (including over-the-counter products) are added; and carry medication information at all times in the event of emergency situations.        Resources     Quit Smoking / Tobacco Use:    I understand the use of any tobacco products increases my chance of suffering from future heart disease or stroke and could cause other illnesses which may shorten my life. Quitting the use of tobacco products is the single most important thing I can do to improve my health. For further information on smoking / tobacco cessation call a Toll Free Quit Line at 1-592.483.6781 (*National Cancer [place]) or 1-840.369.4449 (American Lung Association) or you can access the web based program at www.lungusa.org.    [place] Tobacco Users Help Line:  (267) 679-7634       Toll Free: 1-605.143.4432  Quit Tobacco Program [facility] (922)343-0699    Crisis Hotline:    [place] Crisis Hotline:  6-905-TDFBCEM or 1-903.416.4856    [place] Crisis Hotline:    1-512.448.8172 or 306-793-2063    Discharge Survey:   Thank you for choosing [facility]. We hope we did everything we could to make your hospital stay a pleasant one. You may be receiving a survey and we would appreciate your time and participation in answering the questions. Your input is very valuable to us in our efforts to improve our service to our patients and their families.            Signatures     My signature on this form indicates that:    1. I acknowledge receipt and understanding of these Home Care Instruction.  2. My questions regarding this information have been answered to my satisfaction.  3. I have formulated a plan with my discharge nurse to obtain my prescribed medications for home.    __________________________________       __________________________________                   Patient Signature                                 Guardian/Responsible Adult Signature      __________________________________                 __________       ________                       Nurse Signature                                               Date                 Time

## 2017-04-20 DIAGNOSIS — Z01.812 PRE-OPERATIVE LABORATORY EXAMINATION: ICD-10-CM

## 2017-04-20 LAB — HCG SERPL QL: NEGATIVE

## 2017-04-20 PROCEDURE — 84703 CHORIONIC GONADOTROPIN ASSAY: CPT

## 2017-04-20 PROCEDURE — 36415 COLL VENOUS BLD VENIPUNCTURE: CPT

## 2017-04-20 RX ORDER — HYDROCODONE BITARTRATE AND ACETAMINOPHEN 5; 325 MG/1; MG/1
1-2 TABLET ORAL EVERY 4 HOURS PRN
COMMUNITY
End: 2017-04-20

## 2017-04-20 RX ORDER — HYDROCODONE BITARTRATE AND ACETAMINOPHEN 10; 325 MG/1; MG/1
1-2 TABLET ORAL EVERY 6 HOURS PRN
Status: ON HOLD | COMMUNITY
End: 2017-04-21

## 2017-04-20 NOTE — OR NURSING
Pre admit appt: Pt instructed to continue regularly prescribed medications through day before surgery.Per anesthesia protocol pt instructed to take these medications with a sip of water the day of surgery- norco if needed and synthroid.

## 2017-04-21 ENCOUNTER — HOSPITAL ENCOUNTER (OUTPATIENT)
Facility: MEDICAL CENTER | Age: 47
End: 2017-04-21
Attending: ORTHOPAEDIC SURGERY | Admitting: ORTHOPAEDIC SURGERY
Payer: COMMERCIAL

## 2017-04-21 VITALS
HEART RATE: 80 BPM | RESPIRATION RATE: 14 BRPM | BODY MASS INDEX: 31 KG/M2 | TEMPERATURE: 96.8 F | SYSTOLIC BLOOD PRESSURE: 121 MMHG | HEIGHT: 67 IN | OXYGEN SATURATION: 99 % | WEIGHT: 197.53 LBS | DIASTOLIC BLOOD PRESSURE: 57 MMHG

## 2017-04-21 PROBLEM — G56.01 CARPAL TUNNEL SYNDROME ON RIGHT: Status: ACTIVE | Noted: 2017-04-21

## 2017-04-21 LAB
HCG UR QL: NEGATIVE
SP GR UR REFRACTOMETRY: 1.02

## 2017-04-21 PROCEDURE — 160048 HCHG OR STATISTICAL LEVEL 1-5: Performed by: ORTHOPAEDIC SURGERY

## 2017-04-21 PROCEDURE — 700111 HCHG RX REV CODE 636 W/ 250 OVERRIDE (IP)

## 2017-04-21 PROCEDURE — 501838 HCHG SUTURE GENERAL: Performed by: ORTHOPAEDIC SURGERY

## 2017-04-21 PROCEDURE — 110371 HCHG SHELL REV 272: Performed by: ORTHOPAEDIC SURGERY

## 2017-04-21 PROCEDURE — 500433 HCHG DRESSING, KLING 4': Performed by: ORTHOPAEDIC SURGERY

## 2017-04-21 PROCEDURE — 160009 HCHG ANES TIME/MIN: Performed by: ORTHOPAEDIC SURGERY

## 2017-04-21 PROCEDURE — 500053 HCHG BANDAGE, ELASTIC 4: Performed by: ORTHOPAEDIC SURGERY

## 2017-04-21 PROCEDURE — 700111 HCHG RX REV CODE 636 W/ 250 OVERRIDE (IP): Performed by: ORTHOPAEDIC SURGERY

## 2017-04-21 PROCEDURE — 160002 HCHG RECOVERY MINUTES (STAT): Performed by: ORTHOPAEDIC SURGERY

## 2017-04-21 PROCEDURE — A6222 GAUZE <=16 IN NO W/SAL W/O B: HCPCS | Performed by: ORTHOPAEDIC SURGERY

## 2017-04-21 PROCEDURE — 700101 HCHG RX REV CODE 250

## 2017-04-21 PROCEDURE — 81025 URINE PREGNANCY TEST: CPT

## 2017-04-21 PROCEDURE — 500080: Performed by: ORTHOPAEDIC SURGERY

## 2017-04-21 PROCEDURE — 160035 HCHG PACU - 1ST 60 MINS PHASE I: Performed by: ORTHOPAEDIC SURGERY

## 2017-04-21 PROCEDURE — 110382 HCHG SHELL REV 271: Performed by: ORTHOPAEDIC SURGERY

## 2017-04-21 PROCEDURE — 500881 HCHG PACK, EXTREMITY: Performed by: ORTHOPAEDIC SURGERY

## 2017-04-21 PROCEDURE — 160025 RECOVERY II MINUTES (STATS): Performed by: ORTHOPAEDIC SURGERY

## 2017-04-21 PROCEDURE — 160028 HCHG SURGERY MINUTES - 1ST 30 MINS LEVEL 3: Performed by: ORTHOPAEDIC SURGERY

## 2017-04-21 PROCEDURE — 160046 HCHG PACU - 1ST 60 MINS PHASE II: Performed by: ORTHOPAEDIC SURGERY

## 2017-04-21 RX ORDER — BUPIVACAINE HYDROCHLORIDE 2.5 MG/ML
INJECTION, SOLUTION EPIDURAL; INFILTRATION; INTRACAUDAL
Status: DISCONTINUED | OUTPATIENT
Start: 2017-04-21 | End: 2017-04-21 | Stop reason: HOSPADM

## 2017-04-21 RX ORDER — DIPHENHYDRAMINE HYDROCHLORIDE 50 MG/ML
25 INJECTION INTRAMUSCULAR; INTRAVENOUS EVERY 6 HOURS PRN
Status: DISCONTINUED | OUTPATIENT
Start: 2017-04-21 | End: 2017-04-21 | Stop reason: HOSPADM

## 2017-04-21 RX ORDER — HYDROCODONE BITARTRATE AND ACETAMINOPHEN 5; 325 MG/1; MG/1
1-2 TABLET ORAL EVERY 4 HOURS PRN
COMMUNITY
End: 2017-06-08

## 2017-04-21 RX ORDER — ONDANSETRON 2 MG/ML
4 INJECTION INTRAMUSCULAR; INTRAVENOUS EVERY 4 HOURS PRN
Status: DISCONTINUED | OUTPATIENT
Start: 2017-04-21 | End: 2017-04-21 | Stop reason: HOSPADM

## 2017-04-21 RX ORDER — SODIUM CHLORIDE, SODIUM LACTATE, POTASSIUM CHLORIDE, CALCIUM CHLORIDE 600; 310; 30; 20 MG/100ML; MG/100ML; MG/100ML; MG/100ML
INJECTION, SOLUTION INTRAVENOUS
Status: DISCONTINUED | OUTPATIENT
Start: 2017-04-21 | End: 2017-04-21 | Stop reason: HOSPADM

## 2017-04-21 RX ORDER — SCOLOPAMINE TRANSDERMAL SYSTEM 1 MG/1
1 PATCH, EXTENDED RELEASE TRANSDERMAL
Status: DISCONTINUED | OUTPATIENT
Start: 2017-04-21 | End: 2017-04-21 | Stop reason: HOSPADM

## 2017-04-21 RX ORDER — DEXAMETHASONE SODIUM PHOSPHATE 4 MG/ML
4 INJECTION, SOLUTION INTRA-ARTICULAR; INTRALESIONAL; INTRAMUSCULAR; INTRAVENOUS; SOFT TISSUE
Status: DISCONTINUED | OUTPATIENT
Start: 2017-04-21 | End: 2017-04-21 | Stop reason: HOSPADM

## 2017-04-21 RX ORDER — HALOPERIDOL 5 MG/ML
1 INJECTION INTRAMUSCULAR EVERY 6 HOURS PRN
Status: DISCONTINUED | OUTPATIENT
Start: 2017-04-21 | End: 2017-04-21 | Stop reason: HOSPADM

## 2017-04-21 RX ADMIN — SODIUM CHLORIDE, POTASSIUM CHLORIDE, SODIUM LACTATE AND CALCIUM CHLORIDE: 600; 310; 30; 20 INJECTION, SOLUTION INTRAVENOUS at 08:37

## 2017-04-21 ASSESSMENT — PAIN SCALES - GENERAL
PAINLEVEL_OUTOF10: 0

## 2017-04-21 NOTE — OR NURSING
Pt allergies and NPO status verified, home meds reconcilled. Belongings secured. Pt verbalizes understanding of the pain scale, expected course of stay, and plan of care.  Surgical site verified with pt.  IV access established.

## 2017-04-21 NOTE — OR NURSING
0945: To PACU from OR via gurney, sleeping, respirations spontaneous and non-labored via OPA. R hand surgical dressings c/d/i, elevated on pillows.  0955: Awakens spontaneously, denies pain and nausea. O2 weaning commenced.  1005: O2 d/nahomy  1010: Report to April ROZ

## 2017-04-21 NOTE — OR NURSING
1040- Pt to stage 2, dressed with assist by CNA.  VSS.  Decreased sensation to R hand, able to wiggle fingers, brisk cap refill to R fingers.  R wrist dressing CDI. Pt denies pain/nausea.  to stage 2.  1058- Pt DC'd home with  via w/c to private vehicle.  DC instructions given,  and pt verbalize understanding.

## 2017-04-21 NOTE — OP REPORT
DATE OF SERVICE:  04/21/2017    PREOPERATIVE DIAGNOSIS:  Carpal tunnel syndrome, right hand.    POSTOPERATIVE DIAGNOSIS:  Carpal tunnel syndrome, right hand.    PROCEDURE:  Release of right transverse carpal ligament.    SURGEON:  Shay Powers MD.    ANESTHESIA:  General.    ANESTHESIOLOGIST:  John Hong MD.    SUMMARY:  After satisfactory general anesthesia, the patient's right arm was   prepped and draped in sterile fashion.  Tourniquet was applied and inflated   after exsanguination at 250 mmHg.  A mid palmar incision was made.  Careful   dissection to the distal transverse carpal ligament was performed.  The   patient's anatomy was somewhat unusual and that I typically used the thenar   crease as a guide and she did not have one.  The carpal ligament was   identified.  The distal 0.5 cm was released and the ____ system was carefully   used to sound out the ligament and the release was performed.  Careful   inspection revealed the nerve to be in good shape.  The wound was irrigated   and injected with 0.5% Marcaine with epinephrine and closed with 3-0 nylon   interrupted mattress sutures.  Dressings were applied and the patient was   returned to the recovery room in good condition.  There were no complications.       ____________________________________     MD LORAINE RUFFY / NTS    DD:  04/21/2017 09:44:21  DT:  04/21/2017 10:16:56    D#:  [id number]  Job#:  [id number]

## 2017-04-21 NOTE — DISCHARGE INSTRUCTIONS
ACTIVITY: Rest and take it easy for the first 24 hours.  A responsible adult is recommended to remain with you during that time.  It is normal to feel sleepy.  We encourage you to not do anything that requires balance, judgment or coordination.    MILD FLU-LIKE SYMPTOMS ARE NORMAL. YOU MAY EXPERIENCE GENERALIZED MUSCLE ACHES, THROAT IRRITATION, HEADACHE AND/OR SOME NAUSEA.    FOR 24 HOURS DO NOT:  Drive, operate machinery or run household appliances.  Drink beer or alcoholic beverages.   Make important decisions or sign legal documents.    DIET: To avoid nausea, slowly advance diet as tolerated, avoiding spicy or greasy foods for the first day.  Add more substantial food to your diet according to your physician's instructions.  INCREASE FLUIDS AND FIBER TO AVOID CONSTIPATION.    SURGICAL DRESSING/BATHING: Keep dressing clean, dry and intact until instructed otherwise by surgeon. May shower POD #3 (Monday) with dressing covered. Elevate above heart level. Weight bearing as tolerated to left upper extremity.     FOLLOW-UP APPOINTMENT:  A follow-up appointment should be arranged with your doctor; call to schedule.    You should CALL YOUR PHYSICIAN if you develop:  Fever greater than 101 degrees F.  Pain not relieved by medication, or persistent nausea or vomiting.  Excessive bleeding (blood soaking through dressing) or unexpected drainage from the wound.  Extreme redness or swelling around the incision site, drainage of pus or foul smelling drainage.  Inability to urinate or empty your bladder within 8 hours.  Problems with breathing or chest pain.    You should call 911 if you develop problems with breathing or chest pain.  If you are unable to contact your doctor or surgical center, you should go to the nearest emergency room or urgent care center.  Physician's telephone #: [phone]    If any questions arise, call your doctor.  If your doctor is not available, please feel free to call the Surgical Center at  [phone].  The Center is open Monday through Friday from 7AM to 7PM.  You can also call the HEALTH HOTLINE open 24 hours/day, 7 days/week and speak to a nurse at [phone], or toll free at [phone].    A registered nurse may call you a few days after your surgery to see how you are doing after your procedure.    MEDICATIONS: Resume taking daily medication.  Take prescribed pain medication with food.  If no medication is prescribed, you may take non-aspirin pain medication if needed.  PAIN MEDICATION CAN BE VERY CONSTIPATING.  Take a stool softener or laxative such as senokot, pericolace, or milk of magnesia if needed.    Prescription given pre-operatively.  Last pain medication given at N/A.    If your physician has prescribed pain medication that includes Acetaminophen (Tylenol), do not take additional Acetaminophen (Tylenol) while taking the prescribed medication.    Depression / Suicide Risk    As you are discharged from this [facility] facility, it is important to learn how to keep safe from harming yourself.    Recognize the warning signs:  · Abrupt changes in personality, positive or negative- including increase in energy   · Giving away possessions  · Change in eating patterns- significant weight changes-  positive or negative  · Change in sleeping patterns- unable to sleep or sleeping all the time   · Unwillingness or inability to communicate  · Depression  · Unusual sadness, discouragement and loneliness  · Talk of wanting to die  · Neglect of personal appearance   · Rebelliousness- reckless behavior  · Withdrawal from people/activities they love  · Confusion- inability to concentrate     If you or a loved one observes any of these behaviors or has concerns about self-harm, here's what you can do:  · Talk about it- your feelings and reasons for harming yourself  · Remove any means that you might use to hurt yourself (examples: pills, rope, extension cords, firearm)  · Get professional  help from the community (Mental Health, Substance Abuse, psychological counseling)  · Do not be alone:Call your Safe Contact- someone whom you trust who will be there for you.  · Call your local CRISIS HOTLINE 562-8343 or 993-792-2967  · Call your local Children's Mobile Crisis Response Team [place] (027) 680-3779 or www.Domosite  · Call the toll free National Suicide Prevention Hotlines   · National Suicide Prevention Lifeline 021-105-UFVA (9338)  · National Hope Line Network 800-SUICIDE (444-6279)

## 2017-04-21 NOTE — IP AVS SNAPSHOT
" Home Care Instructions                                                                                                                Name:Nikki Aponte  Medical Record Number:[id number]  CSN: [id number]    YOB: 1970   Age: 47 y.o.  Sex: female  HT:1.702 m (5' 7.01\") WT: 89.6 kg (197 lb 8.5 oz)          Admit Date: 4/21/2017     Discharge Date:   Today's Date: 4/21/2017  Attending Doctor:  Shay Powers M.D.                  Allergies:  Codeine and Penicillins                Discharge Instructions         ACTIVITY: Rest and take it easy for the first 24 hours.  A responsible adult is recommended to remain with you during that time.  It is normal to feel sleepy.  We encourage you to not do anything that requires balance, judgment or coordination.    MILD FLU-LIKE SYMPTOMS ARE NORMAL. YOU MAY EXPERIENCE GENERALIZED MUSCLE ACHES, THROAT IRRITATION, HEADACHE AND/OR SOME NAUSEA.    FOR 24 HOURS DO NOT:  Drive, operate machinery or run household appliances.  Drink beer or alcoholic beverages.   Make important decisions or sign legal documents.    DIET: To avoid nausea, slowly advance diet as tolerated, avoiding spicy or greasy foods for the first day.  Add more substantial food to your diet according to your physician's instructions.  INCREASE FLUIDS AND FIBER TO AVOID CONSTIPATION.    SURGICAL DRESSING/BATHING: Keep dressing clean, dry and intact until instructed otherwise by surgeon. May shower POD #3 (Monday) with dressing covered. Elevate above heart level. Weight bearing as tolerated to left upper extremity.     FOLLOW-UP APPOINTMENT:  A follow-up appointment should be arranged with your doctor; call to schedule.    You should CALL YOUR PHYSICIAN if you develop:  Fever greater than 101 degrees F.  Pain not relieved by medication, or persistent nausea or vomiting.  Excessive bleeding (blood soaking through dressing) or unexpected drainage from the wound.  Extreme redness or swelling around the incision " site, drainage of pus or foul smelling drainage.  Inability to urinate or empty your bladder within 8 hours.  Problems with breathing or chest pain.    You should call 911 if you develop problems with breathing or chest pain.  If you are unable to contact your doctor or surgical center, you should go to the nearest emergency room or urgent care center.  Physician's telephone #: [phone]    If any questions arise, call your doctor.  If your doctor is not available, please feel free to call the Surgical Center at [phone].  The Center is open Monday through Friday from 7AM to 7PM.  You can also call the HEALTH HOTLINE open 24 hours/day, 7 days/week and speak to a nurse at [phone], or toll free at [phone].    A registered nurse may call you a few days after your surgery to see how you are doing after your procedure.    MEDICATIONS: Resume taking daily medication.  Take prescribed pain medication with food.  If no medication is prescribed, you may take non-aspirin pain medication if needed.  PAIN MEDICATION CAN BE VERY CONSTIPATING.  Take a stool softener or laxative such as senokot, pericolace, or milk of magnesia if needed.    Prescription given pre-operatively.  Last pain medication given at N/A.    If your physician has prescribed pain medication that includes Acetaminophen (Tylenol), do not take additional Acetaminophen (Tylenol) while taking the prescribed medication.    Depression / Suicide Risk    As you are discharged from this [facility] facility, it is important to learn how to keep safe from harming yourself.    Recognize the warning signs:  · Abrupt changes in personality, positive or negative- including increase in energy   · Giving away possessions  · Change in eating patterns- significant weight changes-  positive or negative  · Change in sleeping patterns- unable to sleep or sleeping all the time   · Unwillingness or inability to communicate  · Depression  · Unusual sadness,  discouragement and loneliness  · Talk of wanting to die  · Neglect of personal appearance   · Rebelliousness- reckless behavior  · Withdrawal from people/activities they love  · Confusion- inability to concentrate     If you or a loved one observes any of these behaviors or has concerns about self-harm, here's what you can do:  · Talk about it- your feelings and reasons for harming yourself  · Remove any means that you might use to hurt yourself (examples: pills, rope, extension cords, firearm)  · Get professional help from the community (Mental Health, Substance Abuse, psychological counseling)  · Do not be alone:Call your Safe Contact- someone whom you trust who will be there for you.  · Call your local CRISIS HOTLINE 436-4238 or 710-092-8631  · Call your local Children's Mobile Crisis Response Team [place] (938) 811-1100 or www.Jybe  · Call the toll free National Suicide Prevention Hotlines   · National Suicide Prevention Lifeline 964-444-FHAR (5268)  · National Hope Line Network 800-SUICIDE (783-5487)       Medication List      CONTINUE taking these medications        Instructions    Morning Afternoon Evening Bedtime    hydrocodone-acetaminophen 5-325 MG Tabs per tablet   Commonly known as:  NORCO        Take 1-2 Tabs by mouth every four hours as needed.   Dose:  1-2 Tab                        levothyroxine 100 MCG Tabs   Commonly known as:  SYNTHROID        Take 1 Tab by mouth Every morning on an empty stomach.   Dose:  100 mcg                        PARAGARD INTRAUTERINE COPPER Iud        by Intrauterine route.                        sertraline 100 MG Tabs   Commonly known as:  ZOLOFT        Take 1 Tab by mouth every morning.   Dose:  100 mg                                Medication Information     Above and/or attached are the medications your physician expects you to take upon discharge. Review all of your home medications and newly ordered medications with your doctor and/or pharmacist.  Follow medication instructions as directed by your doctor and/or pharmacist. Please keep your medication list with you and share with your physician. Update the information when medications are discontinued, doses are changed, or new medications (including over-the-counter products) are added; and carry medication information at all times in the event of emergency situations.        Resources     Quit Smoking / Tobacco Use:    I understand the use of any tobacco products increases my chance of suffering from future heart disease or stroke and could cause other illnesses which may shorten my life. Quitting the use of tobacco products is the single most important thing I can do to improve my health. For further information on smoking / tobacco cessation call a Toll Free Quit Line at 1-685.683.8790 (*National Cancer [place]) or 1-257.999.5975 (American Lung Association) or you can access the web based program at www.lungusa.org.    [place] Tobacco Users Help Line:  (990) 382-7545       Toll Free: 1-174.549.6648  Quit Tobacco Program [facility] (037)542-7256    Crisis Hotline:    [place] Crisis Hotline:  6-290-BCMPADP or 1-861.239.2921    [place] Crisis Hotline:    1-924.256.3196 or 411-989-3743    Discharge Survey:   Thank you for choosing [facility]. We hope we did everything we could to make your hospital stay a pleasant one. You may be receiving a survey and we would appreciate your time and participation in answering the questions. Your input is very valuable to us in our efforts to improve our service to our patients and their families.            Signatures     My signature on this form indicates that:    1. I acknowledge receipt and understanding of these Home Care Instruction.  2. My questions regarding this information have been answered to my satisfaction.  3. I have formulated a plan with my discharge nurse to obtain my prescribed medications for  home.    __________________________________      __________________________________                   Patient Signature                                 Guardian/Responsible Adult Signature      __________________________________                 __________       ________                       Nurse Signature                                               Date                 Time

## 2017-04-21 NOTE — IP AVS SNAPSHOT
4/21/2017    Nikki Aponte  [address] [place] [address]    Dear Nikki:    [facility] wants to ensure your discharge home is safe and you or your loved ones have had all of your questions answered regarding your care after you leave the hospital.    Below is a list of resources and contact information should you have any questions regarding your hospital stay, follow-up instructions, or active medical symptoms.    Questions or Concerns Regarding… Contact   Medical Questions Related to Your Discharge  (7 days a week, 8am-5pm) Contact a Nurse Care Coordinator   [phone]   Medical Questions Not Related to Your Discharge  (24 hours a day / 7 days a week)  Contact the Nurse Health Line   [phone]    Medications or Discharge Instructions Refer to your discharge packet   or contact your [facility] Primary Care Provider   [phone]   Follow-up Appointment(s) Schedule your appointment via [employer]   or contact Scheduling [phone]   Billing Review your statement via [employer]  or contact Billing [phone]   Medical Records Review your records via [employer]   or contact Medical Records [phone]     You may receive a telephone call within two days of discharge. This call is to make certain you understand your discharge instructions and have the opportunity to have any questions answered. You can also easily access your medical information, test results and upcoming appointments via the [employer] free online health management tool. You can learn more and sign up at [employer]. For assistance setting up your [employer] account, please call [phone].    Once again, we want to ensure your discharge home is safe and that you have a clear understanding of any next steps in your care. If you have any questions or concerns, please do not hesitate to contact us, we are here for you. Thank you for choosing [facility] for your healthcare needs.    Sincerely,    Your [facility] Healthcare Team

## 2017-05-03 ENCOUNTER — HOSPITAL ENCOUNTER (OUTPATIENT)
Dept: LAB | Facility: MEDICAL CENTER | Age: 47
End: 2017-05-03
Attending: FAMILY MEDICINE
Payer: COMMERCIAL

## 2017-05-03 DIAGNOSIS — I65.23 CAROTID ARTERY PLAQUE, BILATERAL: ICD-10-CM

## 2017-05-03 LAB
CHOLEST SERPL-MCNC: 212 MG/DL (ref 100–199)
HDLC SERPL-MCNC: 53 MG/DL
LDLC SERPL CALC-MCNC: 123 MG/DL
TRIGL SERPL-MCNC: 179 MG/DL (ref 0–149)

## 2017-05-03 PROCEDURE — 80061 LIPID PANEL: CPT

## 2017-05-03 PROCEDURE — 36415 COLL VENOUS BLD VENIPUNCTURE: CPT

## 2017-05-04 ENCOUNTER — OFFICE VISIT (OUTPATIENT)
Dept: MEDICAL GROUP | Facility: PHYSICIAN GROUP | Age: 47
End: 2017-05-04
Payer: COMMERCIAL

## 2017-05-04 VITALS
SYSTOLIC BLOOD PRESSURE: 130 MMHG | RESPIRATION RATE: 16 BRPM | TEMPERATURE: 98.6 F | OXYGEN SATURATION: 97 % | HEIGHT: 66 IN | DIASTOLIC BLOOD PRESSURE: 68 MMHG | HEART RATE: 82 BPM | BODY MASS INDEX: 32.3 KG/M2 | WEIGHT: 201 LBS

## 2017-05-04 DIAGNOSIS — R55 PRE-SYNCOPE: ICD-10-CM

## 2017-05-04 DIAGNOSIS — I35.1 NONRHEUMATIC AORTIC VALVE INSUFFICIENCY: ICD-10-CM

## 2017-05-04 DIAGNOSIS — E78.5 DYSLIPIDEMIA: ICD-10-CM

## 2017-05-04 DIAGNOSIS — E03.9 HYPOTHYROIDISM, UNSPECIFIED TYPE: ICD-10-CM

## 2017-05-04 DIAGNOSIS — E66.9 OBESITY (BMI 30-39.9): ICD-10-CM

## 2017-05-04 PROCEDURE — 99214 OFFICE O/P EST MOD 30 MIN: CPT | Performed by: FAMILY MEDICINE

## 2017-05-04 NOTE — MR AVS SNAPSHOT
"        Nikki Aponte   2017 2:40 PM   Office Visit   MRN: [id number]    Department:  [facility]   Dept Phone:  [phone]    Description:  Female : 1970   Provider:  Natalie Donahue M.D.           Allergies as of 2017     Allergen Noted Reactions    Codeine 2010   Itching    Penicillins 2014       States that it does not treat anything when she has been on it.       You were diagnosed with     Pre-syncope   [182095]       Dyslipidemia   [848770]       Hypothyroidism, unspecified type   [2854658]       Nonrheumatic aortic valve insufficiency   [117563]       Obesity (BMI 30-39.9)   [736765]         Vital Signs     Blood Pressure Pulse Temperature Respirations Height Weight    130/68 mmHg 82 37 °C (98.6 °F) 16 1.676 m (5' 5.98\") 91.173 kg (201 lb)    Body Mass Index Oxygen Saturation Last Menstrual Period Smoking Status          32.46 kg/m2 97% 2017 Former Smoker        Basic Information     Date Of Birth Sex Race Ethnicity Preferred Language    1970 Female  Non- English      Your appointments     May 12, 2017  9:00 AM   FOLLOW UP with Sebas Sharma M.D.   [facility] (--)    [address] [place] [address] [address]  [place] [address] [phone]            Oct 05, 2017  3:20 PM   Established Patient with Natalie Donahue M.D.   [facility] (--)    [address]  [address]  [facility] [address] [phone]           You will be receiving a confirmation call a few days before your appointment from our automated call confirmation system.              Problem List              ICD-10-CM Priority Class Noted - Resolved    Hypothyroidism E03.9   2013 - Present    Depression F32.9   2013 - Present    History of Hodgkin's disease Z85.71   2013 - Present    Nonrheumatic aortic valve insufficiency I35.1   2014 - Present    Allergic rhinitis due to allergen J30.9   2014 - Present    Bilateral ductal " carcinoma in situ of breasts D05.11, D05.12   5/10/2016 - Present    S/P radiation therapy - 1990 Mantle Radiation (Chronic) Z92.3   Unknown - Present    History of palpitations in adulthood Z86.79   12/22/2016 - Present    Bilateral acute suppurative otitis media H66.003   1/5/2017 - Present    Carpal tunnel syndrome on left G56.02   3/31/2017 - Present    Carpal tunnel syndrome on right G56.01   4/21/2017 - Present    Obesity (BMI 30-39.9) E66.9   5/4/2017 - Present      Health Maintenance        Date Due Completion Dates    MAMMOGRAM 4/14/2017 4/14/2016, 3/31/2016, 1/10/2014    PAP SMEAR 3/16/2018 3/16/2015 (Postponed)    Override on 3/16/2015: Postponed    IMM DTaP/Tdap/Td Vaccine (2 - Td) 12/22/2026 12/22/2016            Current Immunizations     Influenza Vaccine Quad Inj (Pf) 12/22/2016    Tdap Vaccine 12/22/2016      Below and/or attached are the medications your provider expects you to take. Review all of your home medications and newly ordered medications with your provider and/or pharmacist. Follow medication instructions as directed by your provider and/or pharmacist. Please keep your medication list with you and share with your provider. Update the information when medications are discontinued, doses are changed, or new medications (including over-the-counter products) are added; and carry medication information at all times in the event of emergency situations     Allergies:  CODEINE - Itching     PENICILLINS - (reactions not documented)               Medications  Valid as of: May 04, 2017 -  3:43 PM    Generic Name Brand Name Tablet Size Instructions for use    Copper (IUD) PARAGARD INTRAUTERINE COPPER  by Intrauterine route.        Hydrocodone-Acetaminophen (Tab) NORCO 5-325 MG Take 1-2 Tabs by mouth every four hours as needed.        Levothyroxine Sodium (Tab) SYNTHROID 100 MCG Take 1 Tab by mouth Every morning on an empty stomach.        Sertraline HCl (Tab) ZOLOFT 100 MG Take 1 Tab by mouth every  morning.        .                 Medicines prescribed today were sent to:     [id number] - [place], [place] - [address]    [address] [place] [address]    Phone: [phone] Fax: [phone]    Open 24 Hours?: No      Medication refill instructions:       If your prescription bottle indicates you have medication refills left, it is not necessary to call your provider’s office. Please contact your pharmacy and they will refill your medication.    If your prescription bottle indicates you do not have any refills left, you may request refills at any time through one of the following ways: The online [employer] system (except Urgent Care), by calling your provider’s office, or by asking your pharmacy to contact your provider’s office with a refill request. Medication refills are processed only during regular business hours and may not be available until the next business day. Your provider may request additional information or to have a follow-up visit with you prior to refilling your medication.   *Please Note: Medication refills are assigned a new Rx number when refilled electronically. Your pharmacy may indicate that no refills were authorized even though a new prescription for the same medication is available at the pharmacy. Please request the medicine by name with the pharmacy before contacting your provider for a refill.        Your To Do List     Future Labs/Procedures Complete By Expires    COMP METABOLIC PANEL  As directed 5/5/2018    LIPID PROFILE  As directed 5/5/2018    TSH  As directed 5/5/2018         [employer] Access Code: Activation code not generated  Current [employer] Status: Active

## 2017-05-05 NOTE — PROGRESS NOTES
"Subjective:      Nikki Aponte is a 47 y.o. female who presents with follow-up of medical problems.          HPI     Patient returns for follow-up.    We sent her for workup because of presyncopal episode associated with her migraine headache. She did a carotid ultrasound, CAT scan of the head without contrast and echocardiogram. Carotid ultrasound came back no significant narrowing of the carotid arteries but showed lack formation. CT of the head did not show any acute abnormalities. Echocardiogram came back ejection fraction 60% and aortic insufficiency has worsened from mild to moderate. I have referred her back to the cardiologist and she has an appointment on 5/12/17. She denies any chest pains, palpitations, shortness of breath, leg edema. She has not had any episode of migraine since the last one pH she has not had any syncopal episode.    We follow her for dyslipidemia. She is managing this with her own efforts. She did blood work before his visit.    She continues to take thyroid replacement for hypothyroidism.    Past medical history, past surgical history, family history reviewed-no changes    Social history reviewed-no changes    Allergies reviewed-no changes    Medications reviewed-no changes    ROS     Review of systems as per history of present illness, the rest are negative.       Objective:     /68 mmHg  Pulse 82  Temp(Src) 37 °C (98.6 °F)  Resp 16  Ht 1.676 m (5' 5.98\")  Wt 91.173 kg (201 lb)  BMI 32.46 kg/m2  SpO2 97%  LMP 04/06/2017     Physical Exam     Examined alert, awake, oriented, not in distress    Neck-supple, no lymphadenopathy, no thyromegaly  Lungs-clear to auscultation, no rales, no wheezes  Heart-regular rate and rhythm, no murmur  Extremities-no edema, clubbing, cyanosis          Hospital Outpatient Visit on 05/03/2017   Component Date Value   • Cholesterol,Tot 05/03/2017 212*   • Triglycerides 05/03/2017 179*   • HDL 05/03/2017 53    • LDL 05/03/2017 123*   Admission " on 04/21/2017, Discharged on 04/21/2017   Component Date Value   • Beta-Hcg Urine 04/21/2017 Negative    • Specific Gravity 04/21/2017 1.020    Orders Only on 04/20/2017   Component Date Value   • Beta-Hcg Qualitative Ser* 04/20/2017 Negative      Assessment/Plan:     1. Pre-syncope  No significant findings on above-mentioned testing that we have done to explain the presyncope. Most likely this was related to the migraine headache. She has not had any recurrence. Her echocardiogram however showed worsening of her aortic insufficiency from mild to moderate. Referral has been placed to the cardiologist and she has an appointment next week.    2. Dyslipidemia  Her 10 year cardiovascular disease risk is 1.3%. This is low and so she does not need to be on statin. She will continue to manage this with her own efforts by watching her diet, exercise and weight loss. We will do follow-up lipid panel next visit.  - LIPID PROFILE; Future  - COMP METABOLIC PANEL; Future  - TSH; Future    3. Hypothyroidism, unspecified type  This is adequately replaced per last blood work in February 2017. We will do follow-up TSH next visit. Continue the same dose of thyroid replacement.  - LIPID PROFILE; Future  - COMP METABOLIC PANEL; Future  - TSH; Future    4. Nonrheumatic aortic valve insufficiency  She has worsening of her aortic insufficiency from mild to moderate degree. She is currently asymptomatic. She has appointment with a cardiologist for follow-up next week.  - LIPID PROFILE; Future  - COMP METABOLIC PANEL; Future  - TSH; Future    5. Obesity (BMI 30-39.9)  Discussed diet, exercise and weight loss.  - Patient identified as having weight management issue.  Appropriate orders and counseling given.  - LIPID PROFILE; Future  - COMP METABOLIC PANEL; Future  - TSH; Future      Please note that this dictation was created using voice recognition software. I have worked with consultants from the vendor as well as technical experts from  [facility] to optimize the interface. I have made every reasonable attempt to correct obvious errors, but I expect that there are errors of grammar and possibly content I did not discover before finalizing the note.

## 2017-05-12 ENCOUNTER — OFFICE VISIT (OUTPATIENT)
Dept: CARDIOLOGY | Facility: MEDICAL CENTER | Age: 47
End: 2017-05-12
Payer: COMMERCIAL

## 2017-05-12 VITALS
BODY MASS INDEX: 32.62 KG/M2 | DIASTOLIC BLOOD PRESSURE: 70 MMHG | HEIGHT: 66 IN | OXYGEN SATURATION: 96 % | SYSTOLIC BLOOD PRESSURE: 110 MMHG | WEIGHT: 203 LBS | HEART RATE: 92 BPM

## 2017-05-12 DIAGNOSIS — Z86.79 HISTORY OF PALPITATIONS IN ADULTHOOD: ICD-10-CM

## 2017-05-12 DIAGNOSIS — G47.33 OSA (OBSTRUCTIVE SLEEP APNEA): Chronic | ICD-10-CM

## 2017-05-12 PROCEDURE — 99214 OFFICE O/P EST MOD 30 MIN: CPT | Performed by: INTERNAL MEDICINE

## 2017-05-12 NOTE — MR AVS SNAPSHOT
"        Nikki Aponte   2017 9:00 AM   Office Visit   MRN: [id number]    Department:  [facility]   Dept Phone:  [phone]    Description:  Female : 1970   Provider:  Sebas Sharma M.D.           Reason for Visit     Follow-Up           Allergies as of 2017     Allergen Noted Reactions    Codeine 2010   Itching    Penicillins 2014       States that it does not treat anything when she has been on it.       You were diagnosed with     History of palpitations in adulthood   [0227488]       TONY (obstructive sleep apnea)   [840111]         Vital Signs     Blood Pressure Pulse Height Weight Body Mass Index Oxygen Saturation    110/70 mmHg 92 1.676 m (5' 5.98\") 92.08 kg (203 lb) 32.78 kg/m2 96%    Last Menstrual Period Smoking Status                2017 Former Smoker          Basic Information     Date Of Birth Sex Race Ethnicity Preferred Language    1970 Female  Non- English      Your appointments     Oct 05, 2017  3:20 PM   Established Patient with Natalie Donahue M.D.   [facility] [place] (--)    [address]  [address]  [facility] [id number] [phone]           You will be receiving a confirmation call a few days before your appointment from our automated call confirmation system.              Problem List              ICD-10-CM Priority Class Noted - Resolved    Hypothyroidism E03.9   2013 - Present    Depression F32.9   2013 - Present    History of Hodgkin's disease Z85.71   2013 - Present    Nonrheumatic aortic valve insufficiency I35.1   2014 - Present    Allergic rhinitis due to allergen J30.9   2014 - Present    S/P radiation therapy -  [facility] (Chronic) Z92.3   Unknown - Present    History of palpitations in adulthood Z86.79   2016 - Present    Bilateral acute suppurative otitis media H66.003   2017 - Present    Carpal tunnel syndrome on left G56.02   3/31/2017 - Present    Carpal tunnel " syndrome on right G56.01   4/21/2017 - Present    Obesity (BMI 30-39.9) E66.9   5/4/2017 - Present    TONY (obstructive sleep apnea) (Chronic) G47.33   Unknown - Present      Health Maintenance        Date Due Completion Dates    MAMMOGRAM 4/14/2017 4/14/2016, 3/31/2016, 1/10/2014    PAP SMEAR 3/16/2018 3/16/2015 (Postponed)    Override on 3/16/2015: Postponed    IMM DTaP/Tdap/Td Vaccine (2 - Td) 12/22/2026 12/22/2016            Current Immunizations     Influenza Vaccine Quad Inj (Pf) 12/22/2016    Tdap Vaccine 12/22/2016      Below and/or attached are the medications your provider expects you to take. Review all of your home medications and newly ordered medications with your provider and/or pharmacist. Follow medication instructions as directed by your provider and/or pharmacist. Please keep your medication list with you and share with your provider. Update the information when medications are discontinued, doses are changed, or new medications (including over-the-counter products) are added; and carry medication information at all times in the event of emergency situations     Allergies:  CODEINE - Itching     PENICILLINS - (reactions not documented)               Medications  Valid as of: May 12, 2017 -  9:48 AM    Generic Name Brand Name Tablet Size Instructions for use    Copper (IUD) PARAGARD INTRAUTERINE COPPER  by Intrauterine route.        Hydrocodone-Acetaminophen (Tab) NORCO 5-325 MG Take 1-2 Tabs by mouth every four hours as needed.        Levothyroxine Sodium (Tab) SYNTHROID 100 MCG Take 1 Tab by mouth Every morning on an empty stomach.        Sertraline HCl (Tab) ZOLOFT 100 MG Take 1 Tab by mouth every morning.        .                 Medicines prescribed today were sent to:     [facility]Y #[id number] - [place] - [address]    [address] [place] [address]    Phone: [phone] Fax: [phone]    Open 24 Hours?: No      Medication refill instructions:       If your prescription bottle indicates you  have medication refills left, it is not necessary to call your provider’s office. Please contact your pharmacy and they will refill your medication.    If your prescription bottle indicates you do not have any refills left, you may request refills at any time through one of the following ways: The online Photobucket system (except Urgent Care), by calling your provider’s office, or by asking your pharmacy to contact your provider’s office with a refill request. Medication refills are processed only during regular business hours and may not be available until the next business day. Your provider may request additional information or to have a follow-up visit with you prior to refilling your medication.   *Please Note: Medication refills are assigned a new Rx number when refilled electronically. Your pharmacy may indicate that no refills were authorized even though a new prescription for the same medication is available at the pharmacy. Please request the medicine by name with the pharmacy before contacting your provider for a refill.           Photobucket Access Code: Activation code not generated  Current Photobucket Status: Active

## 2017-05-12 NOTE — Clinical Note
"     [facility]   [address]  [place] [address]  Phone: [phone]  Fax: [phone]              Nikki Aponte  1970    Encounter Date: 5/12/2017    Sebas Sharma M.D.          PROGRESS NOTE:  Subjective:   Nikki Aponte is a 47 y.o. female who presents today for follow-up of her history of palpitations and mild valvular disease    Since her last visit she's had carpal tunnel surgery    She had workup for presyncopal symptoms which suggested her echocardiogram the aortic regurgitation had progressed    Her weight has been stable    She still describes occasions of palpitations      Past Medical History   Diagnosis Date   • Psychiatric problem    • Heart murmur    • Backpain    • Endometriosis    • Hodgkin disease (CMS-HCC)    • Migraine variant 4/4/2013   • Depression 4/4/2013   • Allergic rhinitis 4/4/2013   • Aortic sclerosis (CMS-[facility]) 5/8/2014   • Bronchitis 2012   • Unspecified disorder of thyroid    • Hypothyroidism 4/4/2013   • Arrhythmia      \"Skips a beat\"   • Breath shortness      \"I have scar tissue in right lung from previous radiation, use my inhaler about once a week\"   • Cancer (CMS-HCC) 1990     hodgkins, previous chemo, radiation , breast cancer right side, lumpectomy   • Breast cancer, right (CMS-HCC) 4/2016   • TONY (obstructive sleep apnea)      Past Surgical History   Procedure Laterality Date   • Us-needle core bx-breast panel     • Partial mastectomy Right 5/10/2016     Procedure: PARTIAL MASTECTOMY;  Surgeon: Patricia Romero M.D.;  Location: SURGERY SAME DAY [facility];  Service:    • Breast biopsy Right 5/25/2016     Procedure: BREAST BIOPSY FOR: RE-EXCISION OF RT BREAST TISSUE ;  Surgeon: Patricia Romero M.D.;  Location: SURGERY SAME DAY [facility];  Service:    • Carpal tunnel release Left 3/31/2017     Procedure: CARPAL TUNNEL RELEASE;  Surgeon: Shay Powers M.D.;  Location: SURGERY [facility];  Service: " "   • Carpal tunnel release Right 4/21/2017     Procedure: CARPAL TUNNEL RELEASE;  Surgeon: Shay Powers M.D.;  Location: SURGERY [facility];  Service:      Family History   Problem Relation Age of Onset   • Stroke Mother    • Hypertension Mother    • Stroke Father    • Hypertension Father    • Cancer Father      prostate cancer   • Diabetes Neg Hx      History   Smoking status   • Former Smoker   • Quit date: 03/29/1991   Smokeless tobacco   • Never Used     Allergies   Allergen Reactions   • Codeine Itching   • Penicillins      States that it does not treat anything when she has been on it.      Outpatient Encounter Prescriptions as of 5/12/2017   Medication Sig Dispense Refill   • hydrocodone-acetaminophen (NORCO) 5-325 MG Tab per tablet Take 1-2 Tabs by mouth every four hours as needed.     • PARAGARD INTRAUTERINE COPPER IUD by Intrauterine route.     • sertraline (ZOLOFT) 100 MG Tab Take 1 Tab by mouth every morning. 90 Tab 3   • levothyroxine (SYNTHROID) 100 MCG Tab Take 1 Tab by mouth Every morning on an empty stomach. 90 Tab 1     No facility-administered encounter medications on file as of 5/12/2017.     Review of Systems   Constitutional: Negative for fever and chills.   HENT: Negative for sore throat.    Eyes: Negative for blurred vision.   Respiratory: Negative for cough and shortness of breath.    Cardiovascular: Negative for chest pain, palpitations, claudication, leg swelling and PND.   Gastrointestinal: Negative for nausea and abdominal pain.   Musculoskeletal: Negative for falls.   Skin: Negative for rash.   Neurological: Negative for dizziness, focal weakness, loss of consciousness, weakness and headaches.   Endo/Heme/Allergies: Does not bruise/bleed easily.        Objective:   /70 mmHg  Pulse 92  Ht 1.676 m (5' 5.98\")  Wt 92.08 kg (203 lb)  BMI 32.78 kg/m2  SpO2 96%  LMP 04/06/2017    Physical Exam   Constitutional: No distress.   HENT:   Mouth/Throat: Oropharynx is clear and " moist.   Eyes: No scleral icterus.   Cardiovascular: Normal rate, regular rhythm and intact distal pulses.  Exam reveals no gallop and no friction rub.    Murmur heard.  Pulmonary/Chest: Effort normal and breath sounds normal. She has no rales.   Abdominal: Bowel sounds are normal. There is no tenderness.   Musculoskeletal: She exhibits no edema.   Neurological: She is alert.   Skin: No rash noted. She is not diaphoretic.   Psychiatric: She has a normal mood and affect.     Echocardiogram shows moderate HO insufficiency which is increased from prior    Her treadmill test last fall is normal      Assessment:     1. History of palpitations in adulthood     2. TONY (obstructive sleep apnea)         Medical Decision Making:  Today's Assessment / Status / Plan:     It was my pleasure to meet with Ms. Aponte.    Continue to monitor closely for palpitations    Her echocardiogram remains acceptable last year stress testing was within normal range    We continue surveillance with her prior history of radiation    She will inform office for palpitations we could certainly do a monitor    I will see Ms. Aponte back in 4 years time and encouraged her to follow up with us over the phone or e-mail using my MyChart as issues arise.    It is my pleasure to participate in the care of Ms. Aponte.  Please do not hesitate to contact me with questions or concerns.    Sebas Sharma MD PhD [facility]  Cardiologist [facility]        Patricia Romero M.D.  [address] [place] [address]  VIA Facsimile: [phone]

## 2017-05-17 ENCOUNTER — TELEPHONE (OUTPATIENT)
Dept: MEDICAL GROUP | Facility: PHYSICIAN GROUP | Age: 47
End: 2017-05-17

## 2017-05-17 ENCOUNTER — OFFICE VISIT (OUTPATIENT)
Dept: MEDICAL GROUP | Facility: PHYSICIAN GROUP | Age: 47
End: 2017-05-17
Payer: COMMERCIAL

## 2017-05-17 ENCOUNTER — HOSPITAL ENCOUNTER (OUTPATIENT)
Dept: LAB | Facility: MEDICAL CENTER | Age: 47
End: 2017-05-17
Attending: INTERNAL MEDICINE
Payer: COMMERCIAL

## 2017-05-17 VITALS
BODY MASS INDEX: 33.49 KG/M2 | WEIGHT: 201 LBS | HEART RATE: 74 BPM | DIASTOLIC BLOOD PRESSURE: 70 MMHG | HEIGHT: 65 IN | SYSTOLIC BLOOD PRESSURE: 122 MMHG | OXYGEN SATURATION: 96 % | RESPIRATION RATE: 16 BRPM | TEMPERATURE: 95.5 F

## 2017-05-17 DIAGNOSIS — Z86.79 HISTORY OF PALPITATIONS IN ADULTHOOD: ICD-10-CM

## 2017-05-17 DIAGNOSIS — Z92.3 S/P RADIATION THERAPY: Chronic | ICD-10-CM

## 2017-05-17 DIAGNOSIS — I48.0 PAROXYSMAL ATRIAL FIBRILLATION (HCC): ICD-10-CM

## 2017-05-17 DIAGNOSIS — G56.02 CARPAL TUNNEL SYNDROME ON LEFT: ICD-10-CM

## 2017-05-17 DIAGNOSIS — R50.9 FEVER, UNSPECIFIED FEVER CAUSE: ICD-10-CM

## 2017-05-17 DIAGNOSIS — H66.003 ACUTE SUPPURATIVE OTITIS MEDIA OF BOTH EARS WITHOUT SPONTANEOUS RUPTURE OF TYMPANIC MEMBRANES, RECURRENCE NOT SPECIFIED: ICD-10-CM

## 2017-05-17 DIAGNOSIS — Z85.3 HISTORY OF BREAST CANCER: ICD-10-CM

## 2017-05-17 DIAGNOSIS — G56.01 CARPAL TUNNEL SYNDROME ON RIGHT: ICD-10-CM

## 2017-05-17 LAB
ALBUMIN SERPL BCP-MCNC: 4.3 G/DL (ref 3.2–4.9)
ALBUMIN/GLOB SERPL: 1.4 G/DL
ALP SERPL-CCNC: 88 U/L (ref 30–99)
ALT SERPL-CCNC: 32 U/L (ref 2–50)
ANION GAP SERPL CALC-SCNC: 9 MMOL/L (ref 0–11.9)
ANISOCYTOSIS BLD QL SMEAR: ABNORMAL
APPEARANCE UR: ABNORMAL
AST SERPL-CCNC: 27 U/L (ref 12–45)
BACTERIA #/AREA URNS HPF: ABNORMAL /HPF
BASOPHILS # BLD AUTO: 0.9 % (ref 0–1.8)
BASOPHILS # BLD: 0.05 K/UL (ref 0–0.12)
BILIRUB SERPL-MCNC: 0.4 MG/DL (ref 0.1–1.5)
BILIRUB UR QL STRIP.AUTO: NEGATIVE
BUN SERPL-MCNC: 13 MG/DL (ref 8–22)
CALCIUM SERPL-MCNC: 8.9 MG/DL (ref 8.5–10.5)
CHLORIDE SERPL-SCNC: 105 MMOL/L (ref 96–112)
CO2 SERPL-SCNC: 24 MMOL/L (ref 20–33)
COLOR UR: YELLOW
CREAT SERPL-MCNC: 0.82 MG/DL (ref 0.5–1.4)
CULTURE IF INDICATED INDCX: YES UA CULTURE
EOSINOPHIL # BLD AUTO: 0.28 K/UL (ref 0–0.51)
EOSINOPHIL NFR BLD: 5.3 % (ref 0–6.9)
EPI CELLS #/AREA URNS HPF: ABNORMAL /HPF
ERYTHROCYTE [DISTWIDTH] IN BLOOD BY AUTOMATED COUNT: 46.2 FL (ref 35.9–50)
GFR SERPL CREATININE-BSD FRML MDRD: >60 ML/MIN/1.73 M 2
GIANT PLATELETS BLD QL SMEAR: NORMAL
GLOBULIN SER CALC-MCNC: 3.1 G/DL (ref 1.9–3.5)
GLUCOSE SERPL-MCNC: 99 MG/DL (ref 65–99)
GLUCOSE UR STRIP.AUTO-MCNC: NEGATIVE MG/DL
HCT VFR BLD AUTO: 42 % (ref 37–47)
HGB BLD-MCNC: 13.8 G/DL (ref 12–16)
HYALINE CASTS #/AREA URNS LPF: ABNORMAL /LPF
KETONES UR STRIP.AUTO-MCNC: NEGATIVE MG/DL
LEUKOCYTE ESTERASE UR QL STRIP.AUTO: ABNORMAL
LYMPHOCYTES # BLD AUTO: 1.39 K/UL (ref 1–4.8)
LYMPHOCYTES NFR BLD: 26.3 % (ref 22–41)
MANUAL DIFF BLD: NORMAL
MCH RBC QN AUTO: 30.5 PG (ref 27–33)
MCHC RBC AUTO-ENTMCNC: 32.9 G/DL (ref 33.6–35)
MCV RBC AUTO: 92.7 FL (ref 81.4–97.8)
MICRO URNS: ABNORMAL
MICROCYTES BLD QL SMEAR: ABNORMAL
MONOCYTES # BLD AUTO: 0.37 K/UL (ref 0–0.85)
MONOCYTES NFR BLD AUTO: 7 % (ref 0–13.4)
MORPHOLOGY BLD-IMP: NORMAL
MUCOUS THREADS #/AREA URNS HPF: ABNORMAL /HPF
NEUTROPHILS # BLD AUTO: 3.21 K/UL (ref 2–7.15)
NEUTROPHILS NFR BLD: 57.9 % (ref 44–72)
NEUTS BAND NFR BLD MANUAL: 2.6 % (ref 0–10)
NITRITE UR QL STRIP.AUTO: NEGATIVE
NRBC # BLD AUTO: 0 K/UL
NRBC BLD AUTO-RTO: 0 /100 WBC
PH UR STRIP.AUTO: 6 [PH]
PLATELET # BLD AUTO: 225 K/UL (ref 164–446)
PLATELET BLD QL SMEAR: NORMAL
PMV BLD AUTO: 9.7 FL (ref 9–12.9)
POTASSIUM SERPL-SCNC: 4 MMOL/L (ref 3.6–5.5)
PROT SERPL-MCNC: 7.4 G/DL (ref 6–8.2)
PROT UR QL STRIP: 30 MG/DL
RBC # BLD AUTO: 4.53 M/UL (ref 4.2–5.4)
RBC # URNS HPF: ABNORMAL /HPF
RBC BLD AUTO: PRESENT
RBC UR QL AUTO: ABNORMAL
SODIUM SERPL-SCNC: 138 MMOL/L (ref 135–145)
SP GR UR STRIP.AUTO: 1.02
VARIANT LYMPHS BLD QL SMEAR: NORMAL
WBC # BLD AUTO: 5.3 K/UL (ref 4.8–10.8)
WBC #/AREA URNS HPF: ABNORMAL /HPF

## 2017-05-17 PROCEDURE — 85007 BL SMEAR W/DIFF WBC COUNT: CPT

## 2017-05-17 PROCEDURE — 81001 URINALYSIS AUTO W/SCOPE: CPT

## 2017-05-17 PROCEDURE — 99215 OFFICE O/P EST HI 40 MIN: CPT | Performed by: INTERNAL MEDICINE

## 2017-05-17 PROCEDURE — 85027 COMPLETE CBC AUTOMATED: CPT

## 2017-05-17 PROCEDURE — 80053 COMPREHEN METABOLIC PANEL: CPT

## 2017-05-17 PROCEDURE — 87086 URINE CULTURE/COLONY COUNT: CPT

## 2017-05-17 PROCEDURE — 36415 COLL VENOUS BLD VENIPUNCTURE: CPT

## 2017-05-17 PROCEDURE — 87040 BLOOD CULTURE FOR BACTERIA: CPT

## 2017-05-17 ASSESSMENT — ENCOUNTER SYMPTOMS
WEAKNESS: 0
NAUSEA: 0
BRUISES/BLEEDS EASILY: 0
BLURRED VISION: 0
DIZZINESS: 0
COUGH: 0
FOCAL WEAKNESS: 0
HEADACHES: 0
FEVER: 0
FALLS: 0
PALPITATIONS: 0
CLAUDICATION: 0
SORE THROAT: 0
ABDOMINAL PAIN: 0
PND: 0
LOSS OF CONSCIOUSNESS: 0
CHILLS: 0
SHORTNESS OF BREATH: 0

## 2017-05-17 NOTE — PROGRESS NOTES
"Subjective:   Nikki Aponte is a 47 y.o. female who presents today for follow-up of her history of palpitations and mild valvular disease    Since her last visit she's had carpal tunnel surgery    She had workup for presyncopal symptoms which suggested her echocardiogram the aortic regurgitation had progressed    Her weight has been stable    She still describes occasions of palpitations      Past Medical History   Diagnosis Date   • Psychiatric problem    • Heart murmur    • Backpain    • Endometriosis    • Hodgkin disease (CMS-HCC)    • Migraine variant 4/4/2013   • Depression 4/4/2013   • Allergic rhinitis 4/4/2013   • Aortic sclerosis (CMS-[facility]) 5/8/2014   • Bronchitis 2012   • Unspecified disorder of thyroid    • Hypothyroidism 4/4/2013   • Arrhythmia      \"Skips a beat\"   • Breath shortness      \"I have scar tissue in right lung from previous radiation, use my inhaler about once a week\"   • Cancer (CMS-HCC) 1990     hodgkins, previous chemo, radiation , breast cancer right side, lumpectomy   • Breast cancer, right (CMS-HCC) 4/2016   • TONY (obstructive sleep apnea)      Past Surgical History   Procedure Laterality Date   • Us-needle core bx-breast panel     • Partial mastectomy Right 5/10/2016     Procedure: PARTIAL MASTECTOMY;  Surgeon: Patricia Romero M.D.;  Location: SURGERY SAME DAY [facility];  Service:    • Breast biopsy Right 5/25/2016     Procedure: BREAST BIOPSY FOR: RE-EXCISION OF RT BREAST TISSUE ;  Surgeon: Patricia Romero M.D.;  Location: SURGERY SAME DAY [facility];  Service:    • Carpal tunnel release Left 3/31/2017     Procedure: CARPAL TUNNEL RELEASE;  Surgeon: Shay Powers M.D.;  Location: SURGERY [facility];  Service:    • Carpal tunnel release Right 4/21/2017     Procedure: CARPAL TUNNEL RELEASE;  Surgeon: Shay Powers M.D.;  Location: SURGERY [facility];  Service:      Family History   Problem Relation Age of Onset   • Stroke Mother    • Hypertension Mother    • " "Stroke Father    • Hypertension Father    • Cancer Father      prostate cancer   • Diabetes Neg Hx      History   Smoking status   • Former Smoker   • Quit date: 03/29/1991   Smokeless tobacco   • Never Used     Allergies   Allergen Reactions   • Codeine Itching   • Penicillins      States that it does not treat anything when she has been on it.      Outpatient Encounter Prescriptions as of 5/12/2017   Medication Sig Dispense Refill   • hydrocodone-acetaminophen (NORCO) 5-325 MG Tab per tablet Take 1-2 Tabs by mouth every four hours as needed.     • PARAGARD INTRAUTERINE COPPER IUD by Intrauterine route.     • sertraline (ZOLOFT) 100 MG Tab Take 1 Tab by mouth every morning. 90 Tab 3   • levothyroxine (SYNTHROID) 100 MCG Tab Take 1 Tab by mouth Every morning on an empty stomach. 90 Tab 1     No facility-administered encounter medications on file as of 5/12/2017.     Review of Systems   Constitutional: Negative for fever and chills.   HENT: Negative for sore throat.    Eyes: Negative for blurred vision.   Respiratory: Negative for cough and shortness of breath.    Cardiovascular: Negative for chest pain, palpitations, claudication, leg swelling and PND.   Gastrointestinal: Negative for nausea and abdominal pain.   Musculoskeletal: Negative for falls.   Skin: Negative for rash.   Neurological: Negative for dizziness, focal weakness, loss of consciousness, weakness and headaches.   Endo/Heme/Allergies: Does not bruise/bleed easily.        Objective:   /70 mmHg  Pulse 92  Ht 1.676 m (5' 5.98\")  Wt 92.08 kg (203 lb)  BMI 32.78 kg/m2  SpO2 96%  LMP 04/06/2017    Physical Exam   Constitutional: No distress.   HENT:   Mouth/Throat: Oropharynx is clear and moist.   Eyes: No scleral icterus.   Cardiovascular: Normal rate, regular rhythm and intact distal pulses.  Exam reveals no gallop and no friction rub.    Murmur heard.  Pulmonary/Chest: Effort normal and breath sounds normal. She has no rales.   Abdominal: " Bowel sounds are normal. There is no tenderness.   Musculoskeletal: She exhibits no edema.   Neurological: She is alert.   Skin: No rash noted. She is not diaphoretic.   Psychiatric: She has a normal mood and affect.     Echocardiogram shows moderate HO insufficiency which is increased from prior    Her treadmill test last fall is normal      Assessment:     1. History of palpitations in adulthood     2. TONY (obstructive sleep apnea)         Medical Decision Making:  Today's Assessment / Status / Plan:     It was my pleasure to meet with Ms. Aponte.    Continue to monitor closely for palpitations    Her echocardiogram remains acceptable last year stress testing was within normal range    We continue surveillance with her prior history of radiation    She will inform office for palpitations we could certainly do a monitor    I will see Ms. Aponte back in 4 years time and encouraged her to follow up with us over the phone or e-mail using my MyChart as issues arise.    It is my pleasure to participate in the care of Ms. Aponte.  Please do not hesitate to contact me with questions or concerns.    Sebas Sharma MD PhD FACC  Cardiologist [facility]

## 2017-05-17 NOTE — PROGRESS NOTES
Subjective:   Nikki Aponte is a 47 y.o. female here today for fever, chills, bilateral otalgia     S/P radiation therapy - 1990 Mantle Radiation  History of Hodgkin in 1990. She is doing well since then. She denies unintentional weight loss, night sweats, lymphadenopathy    Paroxysmal atrial fibrillation (CMS-HCC)  On examination today she has irregular heart beats. EKG :     Bilateral acute suppurative otitis media  She was seeing in the clinic 01/05/2017 with for ear infection. She was placed on omnicef. She was doing great. Today she tells me that she still has bilateral earache. On examination, tympanic membrane are intact, no suppurative lesions. No discharges     Carpal tunnel syndrome on left  She had recent carpal tunnel release recently on both hands , left hand is slight painful compared and more swollen to right one. No fluid accumulation     Carpal tunnel syndrome on right  As per above     Fever  She tells me that since Friday she is feeling sick, dizzy, chills, night sweats. She feels cold and hot. Temp today 95.5. She has dry cough and feeling slight sob for last few days. She denies dysuria, diarrhea, abdominal pain, open wounds. She has bilateral earache and slight sore throat. No recent antibiotic use. . BP stable.      History of breast cancer  S/p radiation. One year ago she was diagnosed with partial lumpectomy, right side and radiation. Her surgery oncology is Dr. Monahan. Blanca Sims is  Rad- onc. She was evaluated by oncologist. She was been told that tamoxifen has no much benefits on her case because of her previous history of radiation. She is not following with oncologist anymore       Current medicines (including changes today)  Current Outpatient Prescriptions   Medication Sig Dispense Refill   • PARAGARD INTRAUTERINE COPPER IUD by Intrauterine route.     • sertraline (ZOLOFT) 100 MG Tab Take 1 Tab by mouth every morning. 90 Tab 3   • levothyroxine (SYNTHROID) 100 MCG Tab Take 1 Tab by  mouth Every morning on an empty stomach. 90 Tab 1   • hydrocodone-acetaminophen (NORCO) 5-325 MG Tab per tablet Take 1-2 Tabs by mouth every four hours as needed.       No current facility-administered medications for this visit.     She  has a past medical history of Psychiatric problem; Heart murmur; Backpain; Endometriosis; Hodgkin disease (CMS-HCC); Migraine variant (4/4/2013); Depression (4/4/2013); Allergic rhinitis (4/4/2013); Aortic sclerosis (CMS-HCC) (5/8/2014); Bronchitis (2012); Unspecified disorder of thyroid; Hypothyroidism (4/4/2013); Arrhythmia; Breath shortness; Cancer (CMS-HCC) (1990); Breast cancer, right (CMS-HCC) (4/2016); and TONY (obstructive sleep apnea). She also has no past medical history of COPD.    Current Outpatient Prescriptions   Medication Sig Dispense Refill   • PARAGARD INTRAUTERINE COPPER IUD by Intrauterine route.     • sertraline (ZOLOFT) 100 MG Tab Take 1 Tab by mouth every morning. 90 Tab 3   • levothyroxine (SYNTHROID) 100 MCG Tab Take 1 Tab by mouth Every morning on an empty stomach. 90 Tab 1   • hydrocodone-acetaminophen (NORCO) 5-325 MG Tab per tablet Take 1-2 Tabs by mouth every four hours as needed.       No current facility-administered medications for this visit.       Allergies as of 05/17/2017 - Darrin as Reviewed 05/17/2017   Allergen Reaction Noted   • Codeine Itching 03/29/2010   • Penicillins  12/01/2014       Social History     Social History   • Marital Status: Single     Spouse Name: N/A   • Number of Children: N/A   • Years of Education: N/A     Occupational History   • Not on file.     Social History Main Topics   • Smoking status: Former Smoker     Quit date: 03/29/1991   • Smokeless tobacco: Never Used   • Alcohol Use: 0.0 oz/week     0 Standard drinks or equivalent per week      Comment: 1 per month   • Drug Use: No   • Sexual Activity: Yes     Birth Control/ Protection: IUD     Other Topics Concern   • Not on file     Social History Narrative        Family  "History   Problem Relation Age of Onset   • Stroke Mother    • Hypertension Mother    • Stroke Father    • Hypertension Father    • Cancer Father      prostate cancer   • Diabetes Neg Hx        Past Surgical History   Procedure Laterality Date   • Us-needle core bx-breast panel     • Partial mastectomy Right 5/10/2016     Procedure: PARTIAL MASTECTOMY;  Surgeon: Patricia Romero M.D.;  Location: SURGERY SAME DAY [facility];  Service:    • Breast biopsy Right 5/25/2016     Procedure: BREAST BIOPSY FOR: RE-EXCISION OF RT BREAST TISSUE ;  Surgeon: Patricia Romero M.D.;  Location: SURGERY SAME DAY [facility];  Service:    • Carpal tunnel release Left 3/31/2017     Procedure: CARPAL TUNNEL RELEASE;  Surgeon: Shay Powers M.D.;  Location: SURGERY [facility];  Service:    • Carpal tunnel release Right 4/21/2017     Procedure: CARPAL TUNNEL RELEASE;  Surgeon: Shay Powers M.D.;  Location: SURGERY [facility];  Service:        ROS   All systems reviewed are negative except for history of present illness       Objective:     Blood pressure 122/70, pulse 74, temperature 35.3 °C (95.5 °F), resp. rate 16, height 1.651 m (5' 5\"), weight 91.173 kg (201 lb), last menstrual period 05/14/2017, SpO2 96 %, not currently breastfeeding. Body mass index is 33.45 kg/(m^2).   Physical Exam:  Constitutional: Alert, no distress.  Skin: Warm, dry, good turgor, no rashes in visible areas.  Eye: Equal, round and reactive, conjunctiva clear, lids normal.  ENMT: Lips without lesions, good dentition, oropharynx clear, tympanic membrane intact, no suppuration   Neck: Trachea midline, no masses, no thyromegaly. No cervical or supraclavicular lymphadenopathy  Respiratory: Unlabored respiratory effort, lungs clear to auscultation, no wheezes, no ronchi.  Cardiovascular: Normal S1, S2, no murmur, no edema.  Abdomen: Soft, non-tender, no masses, no hepatosplenomegaly.  Psych: Alert and oriented x3, normal affect and " mood.        Assessment and Plan:   The following treatment plan was discussed    1. Fever, unspecified fever cause  Hypothermic at this time. No clear source of infection, possible viral vs bacterial . BP stable. Heart rate irregular, 105. SIRS 105  I advised pt to go to hospital SIRS 2/2, hypothermia and tachycardia. ??? No CBC in file, which I ordered stat.   She needs blood culture stat , ua stat. Pt refusing to go to ER. Called her cardiology office , I was able to discuss her case with Dr. Bauman who was very kind to advised over the phone. Atrial fibrillation is excacerbated likely from infection. CBC came back, WBC normal. Likely viral. SIRS 1-2/4. I advised pt if she starts to feel worse to go to ER for further eval. Patient really would like to stay away from ER at this time. Continue supportive care, follow up on blood culture. No antibiotic at this time, since there is no clear source of infection   She will follow up with cardiology in one week.    - CBC WITH DIFFERENTIAL; Future  - COMP METABOLIC PANEL; Future  - UA/M W/RFLX CULTURE, ROUTINE  - BLOOD CULTURE; Future    2. History of palpitations in adulthood  A-fib. EKG shows a-fib/flutter. She had been on holter monitoring before and never been diagnosed with a-fib. Not symptomatic, likely paroxysmal a-fib ???   Follow up with cardiology   - EKG; Future    3. Carpal tunnel syndrome on right  Seem healing properly. Follow up with surgery . Follow up with blood culture to rule out infection    4. Carpal tunnel syndrome on left  As per above     5. Acute suppurative otitis media of both ears without spontaneous rupture of tympanic membranes, recurrence not specified  Not infected at this time     6. S/P radiation therapy - 1990 Mantle Radiation  Stable. Cleared by onco     7. History of breast cancer  Cleared by onco. Routine mammogram       Followup: Return if symptoms worsen or fail to improve.

## 2017-05-17 NOTE — ASSESSMENT & PLAN NOTE
History of Hodgkin in 1990. She is doing well since then. She denies unintentional weight loss, night sweats, lymphadenopathy

## 2017-05-17 NOTE — ASSESSMENT & PLAN NOTE
S/p radiation. One year ago she was diagnosed with partial lumpectomy, right side and radiation. Her surgery oncology is Dr. Monahan. Blanca Sims is  Rad- onc. She was evaluated by oncologist. She was been told that tamoxifen has no much benefits on her case because of her previous history of radiation. She is not following with oncologist anymore

## 2017-05-17 NOTE — ASSESSMENT & PLAN NOTE
She was seeing in the clinic 01/05/2017 with for ear infection. She was placed on omnicef. She was doing great. Today she tells me that she still has bilateral earache. On examination, tympanic membrane are intact, no suppurative lesions. No discharges

## 2017-05-17 NOTE — MR AVS SNAPSHOT
"        Nikki Aponte   2017 11:20 AM   Office Visit   MRN: [id number]    Department:  [facility]   Dept Phone:  [phone]    Description:  Female : 1970   Provider:  Ruddy Mansfield M.D.           Reason for Visit     Chills Hard to breathe    Fever dizzy pt states she is worried about infection due to hand surgery       Allergies as of 2017     Allergen Noted Reactions    Codeine 2010   Itching    Penicillins 2014       States that it does not treat anything when she has been on it.       You were diagnosed with     Fever, unspecified fever cause   [6643698]       History of palpitations in adulthood   [5730766]       Carpal tunnel syndrome on right   [199393]       Carpal tunnel syndrome on left   [512657]       Acute suppurative otitis media of both ears without spontaneous rupture of tympanic membranes, recurrence not specified   [1816619]       S/P radiation therapy   [[id number]]       History of breast cancer   [[id number]]         Vital Signs     Blood Pressure Pulse Temperature Respirations Height Weight    122/70 mmHg 74 35.3 °C (95.5 °F) 16 1.651 m (5' 5\") 91.173 kg (201 lb)    Body Mass Index Oxygen Saturation Last Menstrual Period Breastfeeding? Smoking Status       33.45 kg/m2 96% 2017 No Former Smoker       Basic Information     Date Of Birth Sex Race Ethnicity Preferred Language    1970 Female  Non- English      Your appointments     May 17, 2017 12:45 PM   Adult Draw/Collection with [facility]   [facility] (--)    [address]  [facility] [address]   [phone]            Oct 05, 2017  3:20 PM   Established Patient with Natalie Donahue M.D.   [facility] (--)    [address]  [address]  [place] [id number] [phone]           You will be receiving a confirmation call a few days before your appointment from our automated call confirmation system.              Problem List              ICD-10-CM Priority Class Noted - Resolved   " Hypothyroidism E03.9   4/4/2013 - Present    Depression F32.9   4/4/2013 - Present    History of Hodgkin's disease Z85.71   12/30/2013 - Present    Nonrheumatic aortic valve insufficiency I35.1   5/8/2014 - Present    Allergic rhinitis due to allergen J30.9   5/8/2014 - Present    S/P radiation therapy - 1990 Mantle Radiation (Chronic) Z92.3   Unknown - Present    History of palpitations in adulthood Z86.79   12/22/2016 - Present    Bilateral acute suppurative otitis media H66.003   1/5/2017 - Present    Carpal tunnel syndrome on left G56.02   3/31/2017 - Present    Carpal tunnel syndrome on right G56.01   4/21/2017 - Present    Obesity (BMI 30-39.9) E66.9   5/4/2017 - Present    TONY (obstructive sleep apnea) (Chronic) G47.33   Unknown - Present    Fever R50.9   5/17/2017 - Present    History of breast cancer Z85.3   5/17/2017 - Present      Health Maintenance        Date Due Completion Dates    MAMMOGRAM 4/14/2017 4/14/2016, 3/31/2016, 1/10/2014    PAP SMEAR 3/16/2018 3/16/2015 (Postponed)    Override on 3/16/2015: Postponed    IMM DTaP/Tdap/Td Vaccine (2 - Td) 12/22/2026 12/22/2016            Current Immunizations     Influenza Vaccine Quad Inj (Pf) 12/22/2016    Tdap Vaccine 12/22/2016      Below and/or attached are the medications your provider expects you to take. Review all of your home medications and newly ordered medications with your provider and/or pharmacist. Follow medication instructions as directed by your provider and/or pharmacist. Please keep your medication list with you and share with your provider. Update the information when medications are discontinued, doses are changed, or new medications (including over-the-counter products) are added; and carry medication information at all times in the event of emergency situations     Allergies:  CODEINE - Itching     PENICILLINS - (reactions not documented)               Medications  Valid as of: May 17, 2017 - 12:43 PM    Generic Name Brand Name Tablet  "Size Instructions for use    Copper (IUD) PARAGARD INTRAUTERINE COPPER  by Intrauterine route.        Hydrocodone-Acetaminophen (Tab) NORCO 5-325 MG Take 1-2 Tabs by mouth every four hours as needed.        Levothyroxine Sodium (Tab) SYNTHROID 100 MCG Take 1 Tab by mouth Every morning on an empty stomach.        Sertraline HCl (Tab) ZOLOFT 100 MG Take 1 Tab by mouth every morning.        .                 Medicines prescribed today were sent to:     [facility]AC[facility] #[id number] - [place] - [address] [place] [address]    Phone: [phone] Fax: [phone]    Open 24 Hours?: No      Medication refill instructions:       If your prescription bottle indicates you have medication refills left, it is not necessary to call your provider’s office. Please contact your pharmacy and they will refill your medication.    If your prescription bottle indicates you do not have any refills left, you may request refills at any time through one of the following ways: The online [employer] system (except Urgent Care), by calling your provider’s office, or by asking your pharmacy to contact your provider’s office with a refill request. Medication refills are processed only during regular business hours and may not be available until the next business day. Your provider may request additional information or to have a follow-up visit with you prior to refilling your medication.   *Please Note: Medication refills are assigned a new Rx number when refilled electronically. Your pharmacy may indicate that no refills were authorized even though a new prescription for the same medication is available at the pharmacy. Please request the medicine by name with the pharmacy before contacting your provider for a refill.        Your To Do List     Future Labs/Procedures Complete By Expires    BLOOD CULTURE  As directed 5/17/2018    Comments:    Per Hospital Policy: Only change Specimen Src: to \"Line\" if specified by physician order.        CBC " WITH DIFFERENTIAL  As directed 5/18/2018    COMP METABOLIC PANEL  As directed 5/18/2018    EKG  As directed 5/17/2018         MyChart Access Code: Activation code not generated  Current MyChart Status: Active

## 2017-05-17 NOTE — ASSESSMENT & PLAN NOTE
She had recent carpal tunnel release recently on both hands , left hand is slight painful compared and more swollen to right one. No fluid accumulation

## 2017-05-17 NOTE — ASSESSMENT & PLAN NOTE
She tells me that since Friday she is feeling sick, dizzy, chills, night sweats. She feels cold and hot. Temp today 95.5. She has dry cough and feeling slight sob for last few days. She denies dysuria, diarrhea, abdominal pain, open wounds. She has bilateral earache and slight sore throat. No recent antibiotic use. . BP stable.

## 2017-05-18 ENCOUNTER — TELEPHONE (OUTPATIENT)
Dept: CARDIOLOGY | Facility: MEDICAL CENTER | Age: 47
End: 2017-05-18

## 2017-05-18 NOTE — TELEPHONE ENCOUNTER
Occult patient again about her results. He'll remain came positive for trace of blood proteins and bacteria patient patient just got over her menstrual cycle. She has no dysuria, flank pain, increased urinary frequency, or emptying her bladder. CBC showed bandemia, she is doing ok at this time. Likely viral. If she is not getting better, or if she does have constant fever, if she starts to feel worse I advise her to go to ER. She verbalized understanding . Continue tylenol, plenty of fluid and rest at this time. Blood culture to follow   Ruddy Mansfield M.D.

## 2017-05-18 NOTE — TELEPHONE ENCOUNTER
----- Message from Blanca Bauman M.D. sent at 5/17/2017  5:04 PM PDT -----  Dear Dr. Mansfield    Consider it done!    Jailene - can you get Dr Thornton pt in next week?  Any MD or APN    Tx!    Blanca    ----- Message -----     From: Ruddy Mansfield M.D.     Sent: 5/17/2017   4:42 PM       To: Blanca Bauman M.D.    Hi Dr. Bauman,  I spoke with you about this patient today for new diagnose of paroxsymal of a-fib. I appreciate your help. Pt decided to stay home at this time. I would appreciate if you can let your medical assisant know to schedule an apt for her to be seeing at your clinic by you or Dr. Sharma within a week.     Thank you,  Ruddy Mansfield M.D.

## 2017-05-18 NOTE — TELEPHONE ENCOUNTER
Patient scheduled for an appointment with Dr. Sharma 5/26/2017 at 11:15am.     Left patient a voicemail regarding the above. Advised patient via voicemail to call the office at [phone] option 1 if she needs to reschedule.    LUZ RN

## 2017-05-19 LAB
BACTERIA UR CULT: NORMAL
SIGNIFICANT IND 70042: NORMAL
SOURCE SOURCE: NORMAL

## 2017-05-22 LAB
BACTERIA BLD CULT: NORMAL
SIGNIFICANT IND 70042: NORMAL
SITE SITE: NORMAL
SOURCE SOURCE: NORMAL

## 2017-05-24 ENCOUNTER — TELEPHONE (OUTPATIENT)
Dept: MEDICAL GROUP | Facility: PHYSICIAN GROUP | Age: 47
End: 2017-05-24

## 2017-05-24 NOTE — TELEPHONE ENCOUNTER
----- Message from Ruddy Mansfield M.D. sent at 5/23/2017  6:10 PM PDT -----  Please let pt knows that blood cultures are negative

## 2017-05-24 NOTE — TELEPHONE ENCOUNTER
Phone Number Called: [phone] (home) [phone] (work)    Message: Patient notified of result    Left Message for patient to call back: N\A

## 2017-05-26 ENCOUNTER — TELEPHONE (OUTPATIENT)
Dept: CARDIOLOGY | Facility: MEDICAL CENTER | Age: 47
End: 2017-05-26

## 2017-05-26 ENCOUNTER — OFFICE VISIT (OUTPATIENT)
Dept: CARDIOLOGY | Facility: MEDICAL CENTER | Age: 47
End: 2017-05-26
Payer: COMMERCIAL

## 2017-05-26 VITALS
OXYGEN SATURATION: 97 % | WEIGHT: 200 LBS | HEART RATE: 90 BPM | BODY MASS INDEX: 31.39 KG/M2 | SYSTOLIC BLOOD PRESSURE: 140 MMHG | DIASTOLIC BLOOD PRESSURE: 80 MMHG | HEIGHT: 67 IN

## 2017-05-26 DIAGNOSIS — I48.0 PAROXYSMAL ATRIAL FIBRILLATION (HCC): ICD-10-CM

## 2017-05-26 PROCEDURE — 99214 OFFICE O/P EST MOD 30 MIN: CPT | Performed by: INTERNAL MEDICINE

## 2017-05-26 ASSESSMENT — ENCOUNTER SYMPTOMS
WEAKNESS: 0
LOSS OF CONSCIOUSNESS: 0
PALPITATIONS: 1
DIZZINESS: 0
ORTHOPNEA: 1
BRUISES/BLEEDS EASILY: 0
HEADACHES: 0
CLAUDICATION: 0
NAUSEA: 0
SHORTNESS OF BREATH: 0
FALLS: 0
SORE THROAT: 0
CHILLS: 0
FEVER: 0
FOCAL WEAKNESS: 0
ABDOMINAL PAIN: 0
COUGH: 0
PND: 0
BLURRED VISION: 0

## 2017-05-26 NOTE — MR AVS SNAPSHOT
"        Nikki Aponte   2017 11:15 AM   Office Visit   MRN: [id number]    Department:  [facility]   Dept Phone:  [phone]    Description:  Female : 1970   Provider:  Sebas Sharma M.D.           Reason for Visit     Follow-Up           Allergies as of 2017     Allergen Noted Reactions    Codeine 2010   Itching    Penicillins 2014       States that it does not treat anything when she has been on it.       You were diagnosed with     Paroxysmal atrial fibrillation (CMS-HCC)   [062279]         Vital Signs     Blood Pressure Pulse Height Weight Body Mass Index Oxygen Saturation    140/80 mmHg 90 1.702 m (5' 7.01\") 90.719 kg (200 lb) 31.32 kg/m2 97%    Last Menstrual Period Smoking Status                2017 Former Smoker          Basic Information     Date Of Birth Sex Race Ethnicity Preferred Language    1970 Female  Non- English      Your appointments     Oct 05, 2017  3:20 PM   Established Patient with Natalie Donahue M.D.   [facility] (--)    [address]  [address]  [facility] [address] [phone]           You will be receiving a confirmation call a few days before your appointment from our automated call confirmation system.              Problem List              ICD-10-CM Priority Class Noted - Resolved    Hypothyroidism E03.9   2013 - Present    Depression F32.9   2013 - Present    History of Hodgkin's disease Z85.71   2013 - Present    Nonrheumatic aortic valve insufficiency I35.1   2014 - Present    Allergic rhinitis due to allergen J30.9   2014 - Present    S/P radiation therapy -  Mantle Radiation (Chronic) Z92.3   Unknown - Present    Paroxysmal atrial fibrillation (CMS-HCC) I48.0   2016 - Present    Bilateral acute suppurative otitis media H66.003   2017 - Present    Carpal tunnel syndrome on left G56.02   3/31/2017 - Present    Carpal tunnel syndrome on right G56.01   2017 - " Present    Obesity (BMI 30-39.9) E66.9   5/4/2017 - Present    TONY (obstructive sleep apnea) (Chronic) G47.33   Unknown - Present    Fever R50.9   5/17/2017 - Present    History of breast cancer Z85.3   5/17/2017 - Present      Health Maintenance        Date Due Completion Dates    MAMMOGRAM 4/14/2017 4/14/2016, 3/31/2016, 1/10/2014    PAP SMEAR 3/16/2018 3/16/2015 (Postponed)    Override on 3/16/2015: Postponed    IMM DTaP/Tdap/Td Vaccine (2 - Td) 12/22/2026 12/22/2016            Current Immunizations     Influenza Vaccine Quad Inj (Pf) 12/22/2016    Tdap Vaccine 12/22/2016      Below and/or attached are the medications your provider expects you to take. Review all of your home medications and newly ordered medications with your provider and/or pharmacist. Follow medication instructions as directed by your provider and/or pharmacist. Please keep your medication list with you and share with your provider. Update the information when medications are discontinued, doses are changed, or new medications (including over-the-counter products) are added; and carry medication information at all times in the event of emergency situations     Allergies:  CODEINE - Itching     PENICILLINS - (reactions not documented)               Medications  Valid as of: May 26, 2017 - 12:23 PM    Generic Name Brand Name Tablet Size Instructions for use    Albuterol   Inhale  by mouth 2 Times a Day.        Apixaban (Tab) ELIQUIS 5mg Take 1 Tab by mouth 2 Times a Day.        Copper (IUD) PARAGARD INTRAUTERINE COPPER  by Intrauterine route.        Hydrocodone-Acetaminophen (Tab) NORCO 5-325 MG Take 1-2 Tabs by mouth every four hours as needed.        Levothyroxine Sodium (Tab) SYNTHROID 100 MCG Take 1 Tab by mouth Every morning on an empty stomach.        Sertraline HCl (Tab) ZOLOFT 100 MG Take 1 Tab by mouth every morning.        .                 Medicines prescribed today were sent to:     [facility]/PHARMACY #[id number] - [place] - [address]     [address] [place] [address]    Phone: [phone] Fax: [phone]    Open 24 Hours?: No      Medication refill instructions:       If your prescription bottle indicates you have medication refills left, it is not necessary to call your provider’s office. Please contact your pharmacy and they will refill your medication.    If your prescription bottle indicates you do not have any refills left, you may request refills at any time through one of the following ways: The online alike system (except Urgent Care), by calling your provider’s office, or by asking your pharmacy to contact your provider’s office with a refill request. Medication refills are processed only during regular business hours and may not be available until the next business day. Your provider may request additional information or to have a follow-up visit with you prior to refilling your medication.   *Please Note: Medication refills are assigned a new Rx number when refilled electronically. Your pharmacy may indicate that no refills were authorized even though a new prescription for the same medication is available at the pharmacy. Please request the medicine by name with the pharmacy before contacting your provider for a refill.           alike Access Code: Activation code not generated  Current alike Status: Active

## 2017-05-26 NOTE — TELEPHONE ENCOUNTER
Patient scheduled for a Propafenone 8 hour admit on 5-31-17. Cardioversion scheduled that afternoon at 2:00 with Dr. Bauman and anesthesia. FYI to Dr. Bauman and Dr. Sharma.

## 2017-05-27 NOTE — PROGRESS NOTES
Patient is a direct admit from the [facility], patient of Dr. Sharma's for a fib.  Dr. Sharma is accepting the patient.  Written orders received and scanned into epic.  ADT order entered and held in [facility] on 5/27.  Held orders to be released upon patients arrival to unit.

## 2017-05-27 NOTE — PROGRESS NOTES
"Subjective:   Nikki Aponte is a 47 y.o. female who presents today for follow-up of her recent diagnosis of paroxysmal atrial fibrillation. Previously followed her for thyroid disease and her history of radiation    She had symptoms of a viral syndrome and was noted to have atrial fibrillation with variable rate on EKG which has persisted this has resulted in some orthopnea and decreased energy over the week she also does have some seasonal allergies and was wondering if this was contributing    Past Medical History   Diagnosis Date   • Psychiatric problem    • Heart murmur    • Backpain    • Endometriosis    • Hodgkin disease (CMS-HCC)    • Migraine variant 4/4/2013   • Depression 4/4/2013   • Allergic rhinitis 4/4/2013   • Aortic sclerosis (CMS-[facility]) 5/8/2014   • Bronchitis 2012   • Unspecified disorder of thyroid    • Hypothyroidism 4/4/2013   • Arrhythmia      \"Skips a beat\"   • Breath shortness      \"I have scar tissue in right lung from previous radiation, use my inhaler about once a week\"   • Cancer (CMS-HCC) 1990     hodgkins, previous chemo, radiation , breast cancer right side, lumpectomy   • Breast cancer, right (CMS-HCC) 4/2016   • TONY (obstructive sleep apnea)      Past Surgical History   Procedure Laterality Date   • Us-needle core bx-breast panel     • Partial mastectomy Right 5/10/2016     Procedure: PARTIAL MASTECTOMY;  Surgeon: Patricia Romero M.D.;  Location: SURGERY SAME DAY [facility];  Service:    • Breast biopsy Right 5/25/2016     Procedure: BREAST BIOPSY FOR: RE-EXCISION OF RT BREAST TISSUE ;  Surgeon: Patricia Romero M.D.;  Location: SURGERY SAME DAY [facility];  Service:    • Carpal tunnel release Left 3/31/2017     Procedure: CARPAL TUNNEL RELEASE;  Surgeon: Shay Powers M.D.;  Location: SURGERY [facility];  Service:    • Carpal tunnel release Right 4/21/2017     Procedure: CARPAL TUNNEL RELEASE;  Surgeon: Shay Powers M.D.;  Location: SURGERY [facility];  Service: " "     Family History   Problem Relation Age of Onset   • Stroke Mother    • Hypertension Mother    • Stroke Father    • Hypertension Father    • Cancer Father      prostate cancer   • Diabetes Neg Hx      History   Smoking status   • Former Smoker   • Quit date: 03/29/1991   Smokeless tobacco   • Never Used     Allergies   Allergen Reactions   • Codeine Itching   • Penicillins      States that it does not treat anything when she has been on it.      Outpatient Encounter Prescriptions as of 5/26/2017   Medication Sig Dispense Refill   • ALBUTEROL INH Inhale  by mouth 2 Times a Day.     • apixaban (ELIQUIS) 5mg Tab Take 1 Tab by mouth 2 Times a Day. 60 Tab 3   • PARAGARD INTRAUTERINE COPPER IUD by Intrauterine route.     • sertraline (ZOLOFT) 100 MG Tab Take 1 Tab by mouth every morning. 90 Tab 3   • levothyroxine (SYNTHROID) 100 MCG Tab Take 1 Tab by mouth Every morning on an empty stomach. 90 Tab 1   • hydrocodone-acetaminophen (NORCO) 5-325 MG Tab per tablet Take 1-2 Tabs by mouth every four hours as needed.       No facility-administered encounter medications on file as of 5/26/2017.     Review of Systems   Constitutional: Negative for fever and chills.   HENT: Negative for sore throat.    Eyes: Negative for blurred vision.   Respiratory: Negative for cough and shortness of breath.    Cardiovascular: Positive for palpitations and orthopnea. Negative for chest pain, claudication, leg swelling and PND.   Gastrointestinal: Negative for nausea and abdominal pain.   Musculoskeletal: Negative for falls.   Skin: Negative for rash.   Neurological: Negative for dizziness, focal weakness, loss of consciousness, weakness and headaches.   Endo/Heme/Allergies: Does not bruise/bleed easily.        Objective:   /80 mmHg  Pulse 90  Ht 1.702 m (5' 7.01\")  Wt 90.719 kg (200 lb)  BMI 31.32 kg/m2  SpO2 97%  LMP 05/14/2017    Physical Exam   Constitutional: No distress.   HENT:   Mouth/Throat: Oropharynx is clear and moist. "   Eyes: No scleral icterus.   Cardiovascular: Normal rate, regular rhythm and intact distal pulses.  Exam reveals no gallop and no friction rub.    No murmur heard.  Pulmonary/Chest: Effort normal and breath sounds normal. She has no rales.   Abdominal: Bowel sounds are normal. There is no tenderness.   Musculoskeletal: She exhibits no edema.   Neurological: She is alert.   Skin: No rash noted. She is not diaphoretic.   Psychiatric: She has a normal mood and affect.       Assessment:     1. Paroxysmal atrial fibrillation (CMS-HCC)  apixaban (ELIQUIS) 5mg Tab       Medical Decision Making:  Today's Assessment / Status / Plan:     It was my pleasure to meet with Ms. Aponte.    She is accompanied by her     For the new onset of atrial fibrillation this may be due to a viral syndrome, she is a low chads Paske risk for so is at low risk of stroke but I prefer that she take Eliquis (apixaban).  We will arrange for observation for her to try propafenone 600 mg.  If this is effective then she can safely be observed for 4 hours and discharged home on Propafenone for a couple of months 150 mg 3 times a day.  If the propafenone were unsuccessful she'll be nothing by mouth and we can do electrocardioversion that day.    I will see Ms. Aponte back in 1 month time and encouraged her to follow up with us over the phone or e-mail using my MyChart as issues arise.    It is my pleasure to participate in the care of Ms. Aponte.  Please do not hesitate to contact me with questions or concerns.    Sebas Sharma MD PhD FACC  Cardiologist [facility]

## 2017-05-31 ENCOUNTER — HOSPITAL ENCOUNTER (OUTPATIENT)
Facility: MEDICAL CENTER | Age: 47
End: 2017-05-31
Attending: INTERNAL MEDICINE | Admitting: INTERNAL MEDICINE
Payer: COMMERCIAL

## 2017-05-31 VITALS
TEMPERATURE: 97.1 F | OXYGEN SATURATION: 94 % | SYSTOLIC BLOOD PRESSURE: 126 MMHG | DIASTOLIC BLOOD PRESSURE: 71 MMHG | BODY MASS INDEX: 31 KG/M2 | HEIGHT: 67 IN | RESPIRATION RATE: 20 BRPM | WEIGHT: 197.53 LBS | HEART RATE: 81 BPM

## 2017-05-31 LAB — EKG IMPRESSION: NORMAL

## 2017-05-31 PROCEDURE — 93010 ELECTROCARDIOGRAM REPORT: CPT | Performed by: INTERNAL MEDICINE

## 2017-05-31 PROCEDURE — G0378 HOSPITAL OBSERVATION PER HR: HCPCS

## 2017-05-31 PROCEDURE — 93005 ELECTROCARDIOGRAM TRACING: CPT | Performed by: INTERNAL MEDICINE

## 2017-05-31 RX ORDER — PROPAFENONE HYDROCHLORIDE 150 MG/1
600 TABLET, COATED ORAL ONCE
Status: DISCONTINUED | OUTPATIENT
Start: 2017-05-31 | End: 2017-05-31 | Stop reason: HOSPADM

## 2017-05-31 ASSESSMENT — LIFESTYLE VARIABLES
ALCOHOL_USE: NO
EVER_SMOKED: NEVER
EVER_SMOKED: NEVER

## 2017-05-31 ASSESSMENT — PAIN SCALES - GENERAL: PAINLEVEL_OUTOF10: 0

## 2017-05-31 NOTE — IP AVS SNAPSHOT
" <p align=\"LEFT\"><IMG SRC=\"//[email or web address]$/[email or web address]\" alt=\"Image\" WIDTH=\"50%\" HEIGHT=\"200\" BORDER=\"\"></p>                   Name:Nikki Apnote  Medical Record Number:[id number]  CSN: [id number]    YOB: 1970   Age: 47 y.o.  Sex: female  HT:1.702 m (5' 7\") WT: 89.6 kg (197 lb 8.5 oz)          Admit Date: 5/31/2017     Discharge Date:   Today's Date: 5/31/2017  Attending Doctor:  SURESH Hester*                  Allergies:  Codeine and Penicillins          Your appointments     Jun 08, 2017  9:20 AM   Established Patient with Ruddy Mansfield M.D.   [facility] (--)    [address]  [address]  [facility] [address]   [phone]           You will be receiving a confirmation call a few days before your appointment from our automated call confirmation system.            Jun 28, 2017  4:00 PM   HOSPITAL FOLLOW UP with SAVANAH Meehan   [facility] (--)    [address] [place] [address] [address]  [facility] [address]   [phone]            Oct 05, 2017  3:20 PM   Established Patient with Natalie Donahue M.D.   [facility] (--)    [address]  [address]  [place] [address]   [phone]           You will be receiving a confirmation call a few days before your appointment from our automated call confirmation system.                 Medication List      Take these Medications        Instructions    ALBUTEROL INH    Inhale  by mouth 2 Times a Day.       apixaban 5mg Tabs   Commonly known as:  ELIQUIS    Take 1 Tab by mouth 2 Times a Day.   Dose:  5 mg       hydrocodone-acetaminophen 5-325 MG Tabs per tablet   Commonly known as:  NORCO    Take 1-2 Tabs by mouth every four hours as needed.   Dose:  1-2 Tab       levothyroxine 100 MCG Tabs   Commonly known as:  SYNTHROID    Take 1 Tab by mouth Every morning on an empty stomach.   Dose:  100 mcg       PARAGARD INTRAUTERINE COPPER Iud    by Intrauterine route.       sertraline 100 MG Tabs   "   Commonly known as:  ZOLOFT    Take 1 Tab by mouth every morning.   Dose:  100 mg

## 2017-05-31 NOTE — IP AVS SNAPSHOT
BackOps Access Code: Activation code not generated  Current BackOps Status: Active    [email or web address]  A secure, online tool to manage your health information     [email or web address]’s BackOps® is a secure, online tool that connects you to your personalized health information from the privacy of your home -- day or night - making it very easy for you to manage your healthcare. Once the activation process is completed, you can even access your medical information using the BackOps keyona, which is available for free in the Apple Keyona store or Google Play store.     BackOps provides the following levels of access (as shown below):   My Chart Features   [facility] Primary Care Doctor [facility]  Specialists [facility]  Urgent  Care Non-[facility]  Primary Care  Doctor   Email your healthcare team securely and privately 24/7 X X X X   Manage appointments: schedule your next appointment; view details of past/upcoming appointments X      Request prescription refills. X      View recent personal medical records, including lab and immunizations X X X X   View health record, including health history, allergies, medications X X X X   Read reports about your outpatient visits, procedures, consult and ER notes X X X X   See your discharge summary, which is a recap of your hospital and/or ER visit that includes your diagnosis, lab results, and care plan. X X       How to register for BackOps:  1. Go to  [email or web address].  2. Click on the Sign Up Now box, which takes you to the New Member Sign Up page. You will need to provide the following information:  a. Enter your BackOps Access Code exactly as it appears at the top of this page. (You will not need to use this code after you’ve completed the sign-up process. If you do not sign up before the expiration date, you must request a new code.)   b. Enter your date of birth.   c. Enter your home email address.   d. Click Submit, and follow the next screen’s instructions.  3. Create a BackOps ID. This will  be your Applifier login ID and cannot be changed, so think of one that is secure and easy to remember.  4. Create a Applifier password. You can change your password at any time.  5. Enter your Password Reset Question and Answer. This can be used at a later time if you forget your password.   6. Enter your e-mail address. This allows you to receive e-mail notifications when new information is available in Applifier.  7. Click Sign Up. You can now view your health information.    For assistance activating your Applifier account, call [phone]

## 2017-05-31 NOTE — IP AVS SNAPSHOT
" Home Care Instructions                                                                                                                  Name:Nikki Aponte  Medical Record Number:[id number]  CSN: [id number]    YOB: 1970   Age: 47 y.o.  Sex: female  HT:1.702 m (5' 7\") WT: 89.6 kg (197 lb 8.5 oz)          Admit Date: 5/31/2017     Discharge Date:   Today's Date: 5/31/2017  Attending Doctor:  SURESH Hester*                  Allergies:  Codeine and Penicillins            Discharge Instructions       Discharge Instructions    Discharged to home by car with relative. Discharged via wheelchair, hospital escort: Yes.  Special equipment needed: Not Applicable    Be sure to schedule a follow-up appointment with your primary care doctor or any specialists as instructed.     Discharge Plan:   Diet Plan: Discussed  Activity Level: Discussed  Confirmed Follow up Appointment: Appointment Scheduled  Confirmed Symptoms Management: Discussed  Medication Reconciliation Updated: Yes  Influenza Vaccine Indication: Not indicated: Previously immunized this influenza season and > 8 years of age    I understand that a diet low in cholesterol, fat, and sodium is recommended for good health. Unless I have been given specific instructions below for another diet, I accept this instruction as my diet prescription.   Other diet: Heart healthy diet    Special Instructions: None    · Is patient discharged on Warfarin / Coumadin?   No     · Is patient Post Blood Transfusion?  No    Depression / Suicide Risk    As you are discharged from this [facility] facility, it is important to learn how to keep safe from harming yourself.    Recognize the warning signs:  · Abrupt changes in personality, positive or negative- including increase in energy   · Giving away possessions  · Change in eating patterns- significant weight changes-  positive or negative  · Change in sleeping patterns- unable to sleep or sleeping all the " time   · Unwillingness or inability to communicate  · Depression  · Unusual sadness, discouragement and loneliness  · Talk of wanting to die  · Neglect of personal appearance   · Rebelliousness- reckless behavior  · Withdrawal from people/activities they love  · Confusion- inability to concentrate     If you or a loved one observes any of these behaviors or has concerns about self-harm, here's what you can do:  · Talk about it- your feelings and reasons for harming yourself  · Remove any means that you might use to hurt yourself (examples: pills, rope, extension cords, firearm)  · Get professional help from the community (Mental Health, Substance Abuse, psychological counseling)  · Do not be alone:Call your Safe Contact- someone whom you trust who will be there for you.  · Call your local CRISIS HOTLINE 443-1120 or 998-790-2529  · Call your local Children's Mobile Crisis Response Team [place] (022) 446-8297 or www.[employer]  · Call the toll free National Suicide Prevention Hotlines   · National Suicide Prevention Lifeline 462-156-NBQS (8856)  · [place] Hope Line Network 800-SUICIDE (469-8356)  Atrial Fibrillation  Atrial fibrillation is a type of irregular heart rhythm (arrhythmia). During atrial fibrillation, the upper chambers of the heart (atria) quiver continuously in a chaotic pattern. This causes an irregular and often rapid heart rate.   Atrial fibrillation is the result of the heart becoming overloaded with disorganized signals that tell it to beat. These signals are normally released one at a time by a part of the right atrium called the sinoatrial node. They then travel from the atria to the lower chambers of the heart (ventricles), causing the atria and ventricles to contract and pump blood as they pass. In atrial fibrillation, parts of the atria outside of the sinoatrial node also release these signals. This results in two problems. First, the atria receive so many signals that they do not  have time to fully contract. Second, the ventricles, which can only receive one signal at a time, beat irregularly and out of rhythm with the atria.   There are three types of atrial fibrillation:   · Paroxysmal. Paroxysmal atrial fibrillation starts suddenly and stops on its own within a week.  · Persistent. Persistent atrial fibrillation lasts for more than a week. It may stop on its own or with treatment.  · Permanent. Permanent atrial fibrillation does not go away. Episodes of atrial fibrillation may lead to permanent atrial fibrillation.  Atrial fibrillation can prevent your heart from pumping blood normally. It increases your risk of stroke and can lead to heart failure.   CAUSES   · Heart conditions, including a heart attack, heart failure, coronary artery disease, and heart valve conditions.    · Inflammation of the sac that surrounds the heart (pericarditis).  · Blockage of an artery in the lungs (pulmonary embolism).  · Pneumonia or other infections.  · Chronic lung disease.  · Thyroid problems, especially if the thyroid is overactive (hyperthyroidism).  · Caffeine, excessive alcohol use, and use of some illegal drugs.    · Use of some medicines, including certain decongestants and diet pills.  · Heart surgery.    · Birth defects.    Sometimes, no cause can be found. When this happens, the atrial fibrillation is called lone atrial fibrillation. The risk of complications from atrial fibrillation increases if you have lone atrial fibrillation and you are age 60 years or older.  RISK FACTORS  · Heart failure.  · Coronary artery disease.  · Diabetes mellitus.    · High blood pressure (hypertension).    · Obesity.    · Other arrhythmias.    · Increased age.  SIGNS AND SYMPTOMS   · A feeling that your heart is beating rapidly or irregularly.    · A feeling of discomfort or pain in your chest.    · Shortness of breath.    · Sudden light-headedness or weakness.    · Getting tired easily when exercising.     · Urinating more often than normal (mainly when atrial fibrillation first begins).    In paroxysmal atrial fibrillation, symptoms may start and suddenly stop.  DIAGNOSIS   Your health care provider may be able to detect atrial fibrillation when taking your pulse. Your health care provider may have you take a test called an ambulatory electrocardiogram (ECG). An ECG records your heartbeat patterns over a 24-hour period. You may also have other tests, such as:  · Transthoracic echocardiogram (TTE). During echocardiography, sound waves are used to evaluate how blood flows through your heart.  · Transesophageal echocardiogram (PARVEZ).  · Stress test. There is more than one type of stress test. If a stress test is needed, ask your health care provider about which type is best for you.  · Chest X-ray exam.  · Blood tests.  · Computed tomography (CT).  TREATMENT   Treatment may include:  · Treating any underlying conditions. For example, if you have an overactive thyroid, treating the condition may correct atrial fibrillation.  · Taking medicine. Medicines may be given to control a rapid heart rate or to prevent blood clots, heart failure, or a stroke.  · Having a procedure to correct the rhythm of the heart:  ¨ Electrical cardioversion. During electrical cardioversion, a controlled, low-energy shock is delivered to the heart through your skin. If you have chest pain, very low blood pressure, or sudden heart failure, this procedure may need to be done as an emergency.  ¨ Catheter ablation. During this procedure, heart tissues that send the signals that cause atrial fibrillation are destroyed.  ¨ Surgical ablation. During this surgery, thin lines of heart tissue that carry the abnormal signals are destroyed. This procedure can either be an open-heart surgery or a minimally invasive surgery. With the minimally invasive surgery, small cuts are made to access the heart instead of a large opening.  ¨ Pulmonary venous  isolation. During this surgery, tissue around the veins that carry blood from the lungs (pulmonary veins) is destroyed. This tissue is thought to carry the abnormal signals.  HOME CARE INSTRUCTIONS   · Take medicines only as directed by your health care provider. Some medicines can make atrial fibrillation worse or recur.  · If blood thinners were prescribed by your health care provider, take them exactly as directed. Too much blood-thinning medicine can cause bleeding. If you take too little, you will not have the needed protection against stroke and other problems.  · Perform blood tests at home if directed by your health care provider. Perform blood tests exactly as directed.  · Quit smoking if you smoke.  · Do not drink alcohol.  · Do not drink caffeinated beverages such as coffee, soda, and some teas. You may drink decaffeinated coffee, soda, or tea.    · Maintain a healthy weight. Do not use diet pills unless your health care provider approves. They may make heart problems worse.    · Follow diet instructions as directed by your health care provider.  · Exercise regularly as directed by your health care provider.  · Keep all follow-up visits as directed by your health care provider. This is important.  PREVENTION   The following substances can cause atrial fibrillation to recur:   · Caffeinated beverages.  · Alcohol.  · Certain medicines, especially those used for breathing problems.  · Certain herbs and herbal medicines, such as those containing ephedra or ginseng.  · Illegal drugs, such as cocaine and amphetamines.  Sometimes medicines are given to prevent atrial fibrillation from recurring. Proper treatment of any underlying condition is also important in helping prevent recurrence.   SEEK MEDICAL CARE IF:  · You notice a change in the rate, rhythm, or strength of your heartbeat.  · You suddenly begin urinating more frequently.  · You tire more easily when exerting yourself or exercising.  SEEK IMMEDIATE  MEDICAL CARE IF:   · You have chest pain, abdominal pain, sweating, or weakness.  · You feel nauseous.  · You have shortness of breath.  · You suddenly have swollen feet and ankles.  · You feel dizzy.  · Your face or limbs feel numb or weak.  · You have a change in your vision or speech.  MAKE SURE YOU:   · Understand these instructions.  · Will watch your condition.  · Will get help right away if you are not doing well or get worse.     This information is not intended to replace advice given to you by your health care provider. Make sure you discuss any questions you have with your health care provider.     Document Released: 12/18/2006 Document Revised: 01/08/2016 Document Reviewed: 04/13/2016  [employer] Interactive Patient Education ©2016 [employer]        Your appointments     Jun 08, 2017  9:20 AM   Established Patient with Ruddy Mansfield M.D.   [facility] (--)    [address]  [address]  [place] [address]   [phone]           You will be receiving a confirmation call a few days before your appointment from our automated call confirmation system.            Jun 28, 2017  4:00 PM   HOSPITAL FOLLOW UP with JOSESITO Meehan.   [facility] (--)    [address], [facility] [address]  [place] [address]   [phone]            Oct 05, 2017  3:20 PM   Established Patient with Natalie Donahue M.D.   [facility] (--)    [address]  [address]  [place] [address]   [phone]           You will be receiving a confirmation call a few days before your appointment from our automated call confirmation system.                 Discharge Medication Instructions:    Below are the medications your physician expects you to take upon discharge:    Review all your home medications and newly ordered medications with your doctor and/or pharmacist. Follow medication instructions as directed by your doctor and/or pharmacist.    Please keep your medication  list with you and share with your physician.               Medication List      CONTINUE taking these medications        Instructions    Morning Afternoon Evening Bedtime    ALBUTEROL INH   Next Dose Due:  As needed        Inhale  by mouth 2 Times a Day.                        apixaban 5mg Tabs   Commonly known as:  ELIQUIS   Next Dose Due:  Today 5/31        Take 1 Tab by mouth 2 Times a Day.   Dose:  5 mg                        hydrocodone-acetaminophen 5-325 MG Tabs per tablet   Commonly known as:  NORCO   Next Dose Due:  As needed        Take 1-2 Tabs by mouth every four hours as needed.   Dose:  1-2 Tab                        levothyroxine 100 MCG Tabs   Commonly known as:  SYNTHROID   Next Dose Due:  Today 5/31        Take 1 Tab by mouth Every morning on an empty stomach.   Dose:  100 mcg                        PARAGARD INTRAUTERINE COPPER Iud        by Intrauterine route.                        sertraline 100 MG Tabs   Commonly known as:  ZOLOFT   Next Dose Due:  Today 5/31        Take 1 Tab by mouth every morning.   Dose:  100 mg                                Instructions           Diet / Nutrition:    Follow any diet instructions given to you by your doctor or the dietician, including how much salt (sodium) you are allowed each day.    If you are overweight, talk to your doctor about a weight reduction plan.    Activity:    Remain physically active following your doctor's instructions about exercise and activity.    Rest often.     Any time you become even a little tired or short of breath, SIT DOWN and rest.    Worsening Symptoms:    Report any of the following signs and symptoms to the doctor's office immediately:    *Pain of jaw, arm, or neck  *Chest pain not relieved by medication                               *Dizziness or loss of consciousness  *Difficulty breathing even when at rest   *More tired than usual                                       *Bleeding drainage or swelling of surgical  site  *Swelling of feet, ankles, legs or stomach                 *Fever (>100ºF)  *Pink or blood tinged sputum  *Weight gain (3lbs/day or 5lbs /week)           *Shock from internal defibrillator (if applicable)  *Palpitations or irregular heartbeats                *Cool and/or numb extremities    Stroke Awareness    Common Risk Factors for Stroke include:    Age  Atrial Fibrillation  Carotid Artery Stenosis  Diabetes Mellitus  Excessive alcohol consumption  High blood pressure  Overweight   Physical inactivity  Smoking    Warning signs and symptoms of a stroke include:    *Sudden numbness or weakness of the face, arm or leg (especially on one side of the body).  *Sudden confusion, trouble speaking or understanding.  *Sudden trouble seeing in one or both eyes.  *Sudden trouble walking, dizziness, loss of balance or coordination.Sudden severe headache with no known cause.    It is very important to get treatment quickly when a stroke occurs. If you experience any of the above warning signs, call 911 immediately.                   Disclaimer         Quit Smoking / Tobacco Use:    I understand the use of any tobacco products increases my chance of suffering from future heart disease or stroke and could cause other illnesses which may shorten my life. Quitting the use of tobacco products is the single most important thing I can do to improve my health. For further information on smoking / tobacco cessation call a Toll Free Quit Line at 1-870.305.3613 (*[employer] [place]) or 1-899.521.8097 (American Lung Association) or you can access the web based program at www.lungusa.org.    [place] Tobacco Users Help Line:  (318) 857-3733       Toll Free: 1-746.868.9341  Quit Tobacco Program [facility] (754)202-4638    Crisis Hotline:    [place] Crisis Hotline:  5-303-IYTTSVI or 1-908.588.7192    [place] Crisis Hotline:    1-310.548.3689 or 846-075-3185    Discharge Survey:   Thank you for choosing  [facility]. We hope we did everything we could to make your hospital stay a pleasant one. You may be receiving a phone survey and we would appreciate your time and participation in answering the questions. Your input is very valuable to us in our efforts to improve our service to our patients and their families.        My signature on this form indicates that:    1. I have reviewed and understand the above information.  2. My questions regarding this information have been answered to my satisfaction.  3. I have formulated a plan with my discharge nurse to obtain my prescribed medications for home.                  Disclaimer         __________________________________                     __________       ________                       Patient Signature                                                 Date                    Time

## 2017-05-31 NOTE — PROGRESS NOTES
Confirmed with cardiology patient okay to discharge. Monitor discontinued. Discharge instructions and medications discussed with patient, all questions answered, patient verbalizes understanding. Follow up appointments scheduled. Pneumonia and influenza vaccines not indicated. Discharge instructions, prescriptions, and personal belongings with patient. Patient down to car walking with , off unit without incident. Tele monitor signed in and returned to [facility].

## 2017-05-31 NOTE — PROGRESS NOTES
Patient presented for medication treatment of her A. fib. However initial EKG showed sinus rhythm. I did call Dr. Sharma who states to cancel admit and pt can go home

## 2017-05-31 NOTE — DISCHARGE INSTRUCTIONS
Discharge Instructions    Discharged to home by car with relative. Discharged via wheelchair, hospital escort: Yes.  Special equipment needed: Not Applicable    Be sure to schedule a follow-up appointment with your primary care doctor or any specialists as instructed.     Discharge Plan:   Diet Plan: Discussed  Activity Level: Discussed  Confirmed Follow up Appointment: Appointment Scheduled  Confirmed Symptoms Management: Discussed  Medication Reconciliation Updated: Yes  Influenza Vaccine Indication: Not indicated: Previously immunized this influenza season and > 8 years of age    I understand that a diet low in cholesterol, fat, and sodium is recommended for good health. Unless I have been given specific instructions below for another diet, I accept this instruction as my diet prescription.   Other diet: Heart healthy diet    Special Instructions: None    · Is patient discharged on Warfarin / Coumadin?   No     · Is patient Post Blood Transfusion?  No    Depression / Suicide Risk    As you are discharged from this [facility] facility, it is important to learn how to keep safe from harming yourself.    Recognize the warning signs:  · Abrupt changes in personality, positive or negative- including increase in energy   · Giving away possessions  · Change in eating patterns- significant weight changes-  positive or negative  · Change in sleeping patterns- unable to sleep or sleeping all the time   · Unwillingness or inability to communicate  · Depression  · Unusual sadness, discouragement and loneliness  · Talk of wanting to die  · Neglect of personal appearance   · Rebelliousness- reckless behavior  · Withdrawal from people/activities they love  · Confusion- inability to concentrate     If you or a loved one observes any of these behaviors or has concerns about self-harm, here's what you can do:  · Talk about it- your feelings and reasons for harming yourself  · Remove any means that you might use to hurt yourself  (examples: pills, rope, extension cords, firearm)  · Get professional help from the community (Mental Health, Substance Abuse, psychological counseling)  · Do not be alone:Call your Safe Contact- someone whom you trust who will be there for you.  · Call your local CRISIS HOTLINE 646-0258 or 004-672-3014  · Call your local Children's Mobile Crisis Response Team [place] (640) 272-1037 or www.hovelstay  · Call the toll free National Suicide Prevention Hotlines   · National Suicide Prevention Lifeline 635-250-ASOR (8184)  · National Hope Line Network 800-SUICIDE (271-9970)  Atrial Fibrillation  Atrial fibrillation is a type of irregular heart rhythm (arrhythmia). During atrial fibrillation, the upper chambers of the heart (atria) quiver continuously in a chaotic pattern. This causes an irregular and often rapid heart rate.   Atrial fibrillation is the result of the heart becoming overloaded with disorganized signals that tell it to beat. These signals are normally released one at a time by a part of the right atrium called the sinoatrial node. They then travel from the atria to the lower chambers of the heart (ventricles), causing the atria and ventricles to contract and pump blood as they pass. In atrial fibrillation, parts of the atria outside of the sinoatrial node also release these signals. This results in two problems. First, the atria receive so many signals that they do not have time to fully contract. Second, the ventricles, which can only receive one signal at a time, beat irregularly and out of rhythm with the atria.   There are three types of atrial fibrillation:   · Paroxysmal. Paroxysmal atrial fibrillation starts suddenly and stops on its own within a week.  · Persistent. Persistent atrial fibrillation lasts for more than a week. It may stop on its own or with treatment.  · Permanent. Permanent atrial fibrillation does not go away. Episodes of atrial fibrillation may lead to permanent atrial  fibrillation.  Atrial fibrillation can prevent your heart from pumping blood normally. It increases your risk of stroke and can lead to heart failure.   CAUSES   · Heart conditions, including a heart attack, heart failure, coronary artery disease, and heart valve conditions.    · Inflammation of the sac that surrounds the heart (pericarditis).  · Blockage of an artery in the lungs (pulmonary embolism).  · Pneumonia or other infections.  · Chronic lung disease.  · Thyroid problems, especially if the thyroid is overactive (hyperthyroidism).  · Caffeine, excessive alcohol use, and use of some illegal drugs.    · Use of some medicines, including certain decongestants and diet pills.  · Heart surgery.    · Birth defects.    Sometimes, no cause can be found. When this happens, the atrial fibrillation is called lone atrial fibrillation. The risk of complications from atrial fibrillation increases if you have lone atrial fibrillation and you are age 60 years or older.  RISK FACTORS  · Heart failure.  · Coronary artery disease.  · Diabetes mellitus.    · High blood pressure (hypertension).    · Obesity.    · Other arrhythmias.    · Increased age.  SIGNS AND SYMPTOMS   · A feeling that your heart is beating rapidly or irregularly.    · A feeling of discomfort or pain in your chest.    · Shortness of breath.    · Sudden light-headedness or weakness.    · Getting tired easily when exercising.    · Urinating more often than normal (mainly when atrial fibrillation first begins).    In paroxysmal atrial fibrillation, symptoms may start and suddenly stop.  DIAGNOSIS   Your health care provider may be able to detect atrial fibrillation when taking your pulse. Your health care provider may have you take a test called an ambulatory electrocardiogram (ECG). An ECG records your heartbeat patterns over a 24-hour period. You may also have other tests, such as:  · Transthoracic echocardiogram (TTE). During echocardiography, sound waves are  used to evaluate how blood flows through your heart.  · Transesophageal echocardiogram (PARVEZ).  · Stress test. There is more than one type of stress test. If a stress test is needed, ask your health care provider about which type is best for you.  · Chest X-ray exam.  · Blood tests.  · Computed tomography (CT).  TREATMENT   Treatment may include:  · Treating any underlying conditions. For example, if you have an overactive thyroid, treating the condition may correct atrial fibrillation.  · Taking medicine. Medicines may be given to control a rapid heart rate or to prevent blood clots, heart failure, or a stroke.  · Having a procedure to correct the rhythm of the heart:  ¨ Electrical cardioversion. During electrical cardioversion, a controlled, low-energy shock is delivered to the heart through your skin. If you have chest pain, very low blood pressure, or sudden heart failure, this procedure may need to be done as an emergency.  ¨ Catheter ablation. During this procedure, heart tissues that send the signals that cause atrial fibrillation are destroyed.  ¨ Surgical ablation. During this surgery, thin lines of heart tissue that carry the abnormal signals are destroyed. This procedure can either be an open-heart surgery or a minimally invasive surgery. With the minimally invasive surgery, small cuts are made to access the heart instead of a large opening.  ¨ Pulmonary venous isolation. During this surgery, tissue around the veins that carry blood from the lungs (pulmonary veins) is destroyed. This tissue is thought to carry the abnormal signals.  HOME CARE INSTRUCTIONS   · Take medicines only as directed by your health care provider. Some medicines can make atrial fibrillation worse or recur.  · If blood thinners were prescribed by your health care provider, take them exactly as directed. Too much blood-thinning medicine can cause bleeding. If you take too little, you will not have the needed protection against stroke  and other problems.  · Perform blood tests at home if directed by your health care provider. Perform blood tests exactly as directed.  · Quit smoking if you smoke.  · Do not drink alcohol.  · Do not drink caffeinated beverages such as coffee, soda, and some teas. You may drink decaffeinated coffee, soda, or tea.    · Maintain a healthy weight. Do not use diet pills unless your health care provider approves. They may make heart problems worse.    · Follow diet instructions as directed by your health care provider.  · Exercise regularly as directed by your health care provider.  · Keep all follow-up visits as directed by your health care provider. This is important.  PREVENTION   The following substances can cause atrial fibrillation to recur:   · Caffeinated beverages.  · Alcohol.  · Certain medicines, especially those used for breathing problems.  · Certain herbs and herbal medicines, such as those containing ephedra or ginseng.  · Illegal drugs, such as cocaine and amphetamines.  Sometimes medicines are given to prevent atrial fibrillation from recurring. Proper treatment of any underlying condition is also important in helping prevent recurrence.   SEEK MEDICAL CARE IF:  · You notice a change in the rate, rhythm, or strength of your heartbeat.  · You suddenly begin urinating more frequently.  · You tire more easily when exerting yourself or exercising.  SEEK IMMEDIATE MEDICAL CARE IF:   · You have chest pain, abdominal pain, sweating, or weakness.  · You feel nauseous.  · You have shortness of breath.  · You suddenly have swollen feet and ankles.  · You feel dizzy.  · Your face or limbs feel numb or weak.  · You have a change in your vision or speech.  MAKE SURE YOU:   · Understand these instructions.  · Will watch your condition.  · Will get help right away if you are not doing well or get worse.     This information is not intended to replace advice given to you by your health care provider. Make sure you  discuss any questions you have with your health care provider.     Document Released: 12/18/2006 Document Revised: 01/08/2016 Document Reviewed: 04/13/2016  Elsevier Interactive Patient Education ©2016 [employer]

## 2017-05-31 NOTE — IP AVS SNAPSHOT
5/31/2017    Nikki Aponte  [address] [place] [address]    Dear Nikki:    [facility] wants to ensure your discharge home is safe and you or your loved ones have had all of your questions answered regarding your care after you leave the hospital.    Below is a list of resources and contact information should you have any questions regarding your hospital stay, follow-up instructions, or active medical symptoms.    Questions or Concerns Regarding… Contact   Medical Questions Related to Your Discharge  (7 days a week, 8am-5pm) Contact a Nurse Care Coordinator   [phone]   Medical Questions Not Related to Your Discharge  (24 hours a day / 7 days a week)  Contact the Nurse Health Line   [phone]    Medications or Discharge Instructions Refer to your discharge packet   or contact your [facility] Primary Care Provider   [phone]   Follow-up Appointment(s) Schedule your appointment via Booktrack   or contact Scheduling [phone]   Billing Review your statement via Booktrack  or contact Billing [phone]   Medical Records Review your records via Booktrack   or contact Medical Records [phone]     You may receive a telephone call within two days of discharge. This call is to make certain you understand your discharge instructions and have the opportunity to have any questions answered. You can also easily access your medical information, test results and upcoming appointments via the Booktrack free online health management tool. You can learn more and sign up at [employer]/Booktrack. For assistance setting up your Booktrack account, please call [phone].    Once again, we want to ensure your discharge home is safe and that you have a clear understanding of any next steps in your care. If you have any questions or concerns, please do not hesitate to contact us, we are here for you. Thank you for choosing [facility] for your healthcare needs.    Sincerely,    Your [facility] Healthcare Team

## 2017-05-31 NOTE — PROGRESS NOTES
Obtained EKG. EKG showing sinus rhythm. Tele monitor showing sinus rhythm since admit. Karina Gibbons with cardiology notified.

## 2017-05-31 NOTE — DISCHARGE PLANNING
TC CM contacted pt to confirm admission today.  Pt is preparing to come to the hospital today.  CM provided pt with room assignment and instructions on where to report.    [facility] orders faxed to unit [phone]

## 2017-06-05 ENCOUNTER — OFFICE VISIT (OUTPATIENT)
Dept: URGENT CARE | Facility: CLINIC | Age: 47
End: 2017-06-05
Payer: COMMERCIAL

## 2017-06-05 VITALS
BODY MASS INDEX: 31.52 KG/M2 | WEIGHT: 200.8 LBS | HEIGHT: 67 IN | TEMPERATURE: 98.4 F | DIASTOLIC BLOOD PRESSURE: 64 MMHG | SYSTOLIC BLOOD PRESSURE: 100 MMHG | OXYGEN SATURATION: 94 % | HEART RATE: 89 BPM

## 2017-06-05 DIAGNOSIS — I48.91 ATRIAL FIBRILLATION, UNSPECIFIED TYPE (HCC): ICD-10-CM

## 2017-06-05 DIAGNOSIS — J20.9 ACUTE BRONCHITIS, UNSPECIFIED ORGANISM: ICD-10-CM

## 2017-06-05 PROCEDURE — 99214 OFFICE O/P EST MOD 30 MIN: CPT | Performed by: NURSE PRACTITIONER

## 2017-06-05 RX ORDER — DOXYCYCLINE HYCLATE 100 MG
100 TABLET ORAL 2 TIMES DAILY
Qty: 14 TAB | Refills: 0 | Status: SHIPPED | OUTPATIENT
Start: 2017-06-05 | End: 2017-06-12

## 2017-06-05 ASSESSMENT — ENCOUNTER SYMPTOMS
FEVER: 0
COUGH: 1
WHEEZING: 1
RHINORRHEA: 1
CHILLS: 0
SHORTNESS OF BREATH: 1
MYALGIAS: 0

## 2017-06-05 NOTE — MR AVS SNAPSHOT
"        Nikki Aponte   2017 2:15 PM   Office Visit   MRN: [id number]    Department:  [facility]   Dept Phone:  [phone]    Description:  Female : 1970   Provider:  HARRIET Tyler           Reason for Visit     Cough x2days     Shortness of Breath phlem x2weeks       Allergies as of 2017     Allergen Noted Reactions    Codeine 2010   Itching    Penicillins 2014       States that it does not treat anything when she has been on it.       You were diagnosed with     Acute bronchitis, unspecified organism   [[id number]]       Atrial fibrillation, unspecified type (CMS-[facility])   [[id number]]         Vital Signs     Blood Pressure Pulse Temperature Height Weight Body Mass Index    100/64 mmHg 89 36.9 °C (98.4 °F) 1.702 m (5' 7.01\") 91.082 kg (200 lb 12.8 oz) 31.44 kg/m2    Oxygen Saturation Last Menstrual Period Smoking Status             94% 2017 Former Smoker         Basic Information     Date Of Birth Sex Race Ethnicity Preferred Language    1970 Female  Non- English      Your appointments     2017  9:20 AM   Established Patient with Ruddy Mansfield M.D.   [facility] (--)    [address]  [address]  "[place] [id number] [phone]           You will be receiving a confirmation call a few days before your appointment from our automated call confirmation system.            2017  4:00 PM   HOSPITAL FOLLOW UP with SAVANAH Meehan   [facility] (--)    [address], [facility] [address]  [place] [address] [phone]            Oct 05, 2017  3:20 PM   Established Patient with Natalie Donahue M.D.   [facility] [place] (--)    [address] [place] [address]  [address]  "[place] [address] [phone]           You will be receiving a confirmation call a few days before your appointment from our automated call confirmation system.              Problem List              ICD-10-CM Priority Class Noted " - Resolved    Hypothyroidism E03.9   4/4/2013 - Present    Depression F32.9   4/4/2013 - Present    History of Hodgkin's disease Z85.71   12/30/2013 - Present    Nonrheumatic aortic valve insufficiency I35.1   5/8/2014 - Present    Allergic rhinitis due to allergen J30.9   5/8/2014 - Present    S/P radiation therapy - 1990 Mantle Radiation (Chronic) Z92.3   Unknown - Present    Paroxysmal atrial fibrillation (CMS-HCC) I48.0   12/22/2016 - Present    Bilateral acute suppurative otitis media H66.003   1/5/2017 - Present    Carpal tunnel syndrome on left G56.02   3/31/2017 - Present    Carpal tunnel syndrome on right G56.01   4/21/2017 - Present    Obesity (BMI 30-39.9) E66.9   5/4/2017 - Present    TONY (obstructive sleep apnea) (Chronic) G47.33   Unknown - Present    Fever R50.9   5/17/2017 - Present    History of breast cancer Z85.3   5/17/2017 - Present      Health Maintenance        Date Due Completion Dates    MAMMOGRAM 4/14/2017 4/14/2016, 3/31/2016, 1/10/2014    PAP SMEAR 3/16/2018 3/16/2015 (Postponed)    Override on 3/16/2015: Postponed    IMM DTaP/Tdap/Td Vaccine (2 - Td) 12/22/2026 12/22/2016            Current Immunizations     Influenza Vaccine Quad Inj (Pf) 12/22/2016    Pneumococcal Vaccine (PCV7) Historical Data 5/31/2002    Tdap Vaccine 12/22/2016      Below and/or attached are the medications your provider expects you to take. Review all of your home medications and newly ordered medications with your provider and/or pharmacist. Follow medication instructions as directed by your provider and/or pharmacist. Please keep your medication list with you and share with your provider. Update the information when medications are discontinued, doses are changed, or new medications (including over-the-counter products) are added; and carry medication information at all times in the event of emergency situations     Allergies:  CODEINE - Itching     PENICILLINS - (reactions not documented)                  Medications  Valid as of: June 05, 2017 -  2:44 PM    Generic Name Brand Name Tablet Size Instructions for use    Albuterol   Inhale  by mouth 2 Times a Day.        Apixaban (Tab) ELIQUIS 5mg Take 1 Tab by mouth 2 Times a Day.        Copper (IUD) PARAGARD INTRAUTERINE COPPER  by Intrauterine route.        Doxycycline Hyclate (Tab) VIBRAMYCIN 100 MG Take 1 Tab by mouth 2 times a day for 7 days.        Hydrocod Polst-Chlorphen Polst (Suspension Extended Release) TUSSIONEX 10-8 MG/5ML Take 5 mL by mouth at bedtime as needed for Cough or Rhinitis.        Hydrocodone-Acetaminophen (Tab) NORCO 5-325 MG Take 1-2 Tabs by mouth every four hours as needed.        Levothyroxine Sodium (Tab) SYNTHROID 100 MCG Take 1 Tab by mouth Every morning on an empty stomach.        Sertraline HCl (Tab) ZOLOFT 100 MG Take 1 Tab by mouth every morning.        .                 Medicines prescribed today were sent to:     [facility][id number] - [place]IG[place] - [address] [place] [address]    Phone: [phone] Fax: [phone]    Open 24 Hours?: No      Medication refill instructions:       If your prescription bottle indicates you have medication refills left, it is not necessary to call your provider’s office. Please contact your pharmacy and they will refill your medication.    If your prescription bottle indicates you do not have any refills left, you may request refills at any time through one of the following ways: The online Share Your Brain system (except Urgent Care), by calling your provider’s office, or by asking your pharmacy to contact your provider’s office with a refill request. Medication refills are processed only during regular business hours and may not be available until the next business day. Your provider may request additional information or to have a follow-up visit with you prior to refilling your medication.   *Please Note: Medication refills are assigned a new Rx number when refilled electronically. Your pharmacy  may indicate that no refills were authorized even though a new prescription for the same medication is available at the pharmacy. Please request the medicine by name with the pharmacy before contacting your provider for a refill.           Neksthart Access Code: Activation code not generated  Current InstaMedt Status: Active

## 2017-06-05 NOTE — PROGRESS NOTES
"Subjective:      Nikki Aponte is a 47 y.o. female who presents with Cough and Shortness of Breath            Cough  This is a new problem. The current episode started 1 to 4 weeks ago. The problem has been unchanged. The problem occurs constantly. The cough is non-productive. Associated symptoms include nasal congestion, postnasal drip, rhinorrhea, shortness of breath and wheezing. Pertinent negatives include no chills, fever or myalgias. She has tried a beta-agonist inhaler and OTC cough suppressant for the symptoms. The treatment provided mild relief.   Shortness of Breath  Associated symptoms include rhinorrhea and wheezing. Pertinent negatives include no fever.     Allergies, medications and history reviewed by me today  Recently hospitalized for a fib. On eliquis.   Review of Systems   Constitutional: Negative for fever and chills.   HENT: Positive for postnasal drip and rhinorrhea.    Respiratory: Positive for cough, shortness of breath and wheezing.    Musculoskeletal: Negative for myalgias.          Objective:     /64 mmHg  Pulse 89  Temp(Src) 36.9 °C (98.4 °F)  Ht 1.702 m (5' 7.01\")  Wt 91.082 kg (200 lb 12.8 oz)  BMI 31.44 kg/m2  SpO2 94%  LMP 05/14/2017     Physical Exam   Constitutional: She is oriented to person, place, and time. She appears well-developed and well-nourished. No distress.   HENT:   Head: Normocephalic and atraumatic.   Right Ear: External ear and ear canal normal. Tympanic membrane is not injected and not perforated. No middle ear effusion.   Left Ear: External ear and ear canal normal. Tympanic membrane is not injected and not perforated.  No middle ear effusion.   Nose: Mucosal edema present.   Mouth/Throat: Posterior oropharyngeal erythema present. No oropharyngeal exudate.   Eyes: Conjunctivae are normal. Right eye exhibits no discharge. Left eye exhibits no discharge.   Neck: Normal range of motion. Neck supple.   Cardiovascular: Normal heart sounds.  An irregularly " irregular rhythm present.   No murmur heard.  Pulmonary/Chest: Effort normal and breath sounds normal. No respiratory distress.   Musculoskeletal: Normal range of motion.   Normal movement of all 4 extremities.   Lymphadenopathy:     She has no cervical adenopathy.        Right: No supraclavicular adenopathy present.        Left: No supraclavicular adenopathy present.   Neurological: She is alert and oriented to person, place, and time. Gait normal.   Skin: Skin is warm and dry.   Psychiatric: She has a normal mood and affect. Her behavior is normal. Thought content normal.   Nursing note and vitals reviewed.              Assessment/Plan:     1. Acute bronchitis, unspecified organism  doxycycline (VIBRAMYCIN) 100 MG Tab    Hydrocod Polst-CPM Polst ER (TUSSIONEX) 10-8 MG/5ML Suspension Extended Release   2. Atrial fibrillation, unspecified type (CMS-HCC)       Patient is cautioned on sedation potential of narcotic medication; no drinking, driving or operating heavy machinery while on this medication.  Doxy.  Patient advised to call cardiology regarding return to afib.

## 2017-06-08 ENCOUNTER — OFFICE VISIT (OUTPATIENT)
Dept: MEDICAL GROUP | Facility: PHYSICIAN GROUP | Age: 47
End: 2017-06-08
Payer: COMMERCIAL

## 2017-06-08 VITALS
OXYGEN SATURATION: 95 % | BODY MASS INDEX: 31.23 KG/M2 | RESPIRATION RATE: 16 BRPM | HEIGHT: 67 IN | WEIGHT: 199 LBS | DIASTOLIC BLOOD PRESSURE: 80 MMHG | TEMPERATURE: 98.2 F | SYSTOLIC BLOOD PRESSURE: 110 MMHG | HEART RATE: 89 BPM

## 2017-06-08 DIAGNOSIS — I48.0 PAROXYSMAL ATRIAL FIBRILLATION (HCC): ICD-10-CM

## 2017-06-08 DIAGNOSIS — J40 BRONCHITIS: ICD-10-CM

## 2017-06-08 DIAGNOSIS — R50.9 FEVER, UNSPECIFIED FEVER CAUSE: ICD-10-CM

## 2017-06-08 PROCEDURE — 99214 OFFICE O/P EST MOD 30 MIN: CPT | Performed by: INTERNAL MEDICINE

## 2017-06-08 NOTE — MR AVS SNAPSHOT
"        Nikki Aponte   2017 9:20 AM   Office Visit   MRN: [id number]    Department:  [facility]   Dept Phone:  [phone]    Description:  Female : 1970   Provider:  Ruddy Mansfield M.D.           Allergies as of 2017     Allergen Noted Reactions    Codeine 2010   Itching    Penicillins 2014       States that it does not treat anything when she has been on it.       You were diagnosed with     Paroxysmal atrial fibrillation (CMS-[facility])   [753840]       Fever, unspecified fever cause   [7727767]       Bronchitis   [315932]         Vital Signs     Blood Pressure Pulse Temperature Respirations Height Weight    110/80 mmHg 89 36.8 °C (98.2 °F) 16 1.702 m (5' 7.01\") 90.266 kg (199 lb)    Body Mass Index Oxygen Saturation Last Menstrual Period Breastfeeding? Smoking Status       31.16 kg/m2 95% 2017 No Former Smoker       Basic Information     Date Of Birth Sex Race Ethnicity Preferred Language    1970 Female  Non- English      Your appointments     2017  4:00 PM   HOSPITAL FOLLOW UP with SAVANAH Meehan   [facility] (--)    [address] [place] [address] [address]  [place] [address]   [phone]            Sep 12, 2017  4:00 PM   Established Patient with Natalie Donahue M.D.   [facility] (--)    [address]  [address]  [place] [address]   [phone]           You will be receiving a confirmation call a few days before your appointment from our automated call confirmation system.            Oct 05, 2017  3:20 PM   Established Patient with Natalie Donahue M.D.   [facility] (--)    [address]  [address]  [place] [address]   [phone]           You will be receiving a confirmation call a few days before your appointment from our automated call confirmation system.              Problem List              ICD-10-CM Priority Class Noted - Resolved    Hypothyroidism E03.9   2013 - Present   "    Depression F32.9   4/4/2013 - Present    History of Hodgkin's disease Z85.71   12/30/2013 - Present    Nonrheumatic aortic valve insufficiency I35.1   5/8/2014 - Present    Allergic rhinitis due to allergen J30.9   5/8/2014 - Present    S/P radiation therapy - 1990 Mantle Radiation (Chronic) Z92.3   Unknown - Present    Paroxysmal atrial fibrillation (CMS-HCC) I48.0   12/22/2016 - Present    Bilateral acute suppurative otitis media H66.003   1/5/2017 - Present    Carpal tunnel syndrome on left G56.02   3/31/2017 - Present    Carpal tunnel syndrome on right G56.01   4/21/2017 - Present    Obesity (BMI 30-39.9) E66.9   5/4/2017 - Present    TONY (obstructive sleep apnea) (Chronic) G47.33   Unknown - Present    History of breast cancer Z85.3   5/17/2017 - Present    Bronchitis J40   6/8/2017 - Present      Health Maintenance        Date Due Completion Dates    MAMMOGRAM 4/14/2017 4/14/2016, 3/31/2016, 1/10/2014    PAP SMEAR 3/16/2018 3/16/2015 (Postponed)    Override on 3/16/2015: Postponed    IMM DTaP/Tdap/Td Vaccine (2 - Td) 12/22/2026 12/22/2016            Current Immunizations     Influenza Vaccine Quad Inj (Pf) 12/22/2016    Pneumococcal Vaccine (PCV7) Historical Data 5/31/2002    Tdap Vaccine 12/22/2016      Below and/or attached are the medications your provider expects you to take. Review all of your home medications and newly ordered medications with your provider and/or pharmacist. Follow medication instructions as directed by your provider and/or pharmacist. Please keep your medication list with you and share with your provider. Update the information when medications are discontinued, doses are changed, or new medications (including over-the-counter products) are added; and carry medication information at all times in the event of emergency situations     Allergies:  CODEINE - Itching     PENICILLINS - (reactions not documented)               Medications  Valid as of: June 08, 2017 -  9:41 AM    Generic  Name Brand Name Tablet Size Instructions for use    Albuterol   Inhale  by mouth 2 Times a Day.        Apixaban (Tab) ELIQUIS 5mg Take 1 Tab by mouth 2 Times a Day.        Copper (IUD) PARAGARD INTRAUTERINE COPPER  by Intrauterine route.        Doxycycline Hyclate (Tab) VIBRAMYCIN 100 MG Take 1 Tab by mouth 2 times a day for 7 days.        Hydrocod Polst-Chlorphen Polst (Suspension Extended Release) TUSSIONEX 10-8 MG/5ML Take 5 mL by mouth at bedtime as needed for Cough or Rhinitis.        Levothyroxine Sodium (Tab) SYNTHROID 100 MCG Take 1 Tab by mouth Every morning on an empty stomach.        Sertraline HCl (Tab) ZOLOFT 100 MG Take 1 Tab by mouth every morning.        .                 Medicines prescribed today were sent to:     [facility]/PHARMACY #[id number] - [place] - [address] [place] [address]    Phone: [phone] Fax: [phone]    Open 24 Hours?: No      Medication refill instructions:       If your prescription bottle indicates you have medication refills left, it is not necessary to call your provider’s office. Please contact your pharmacy and they will refill your medication.    If your prescription bottle indicates you do not have any refills left, you may request refills at any time through one of the following ways: The online [employer] system (except Urgent Care), by calling your provider’s office, or by asking your pharmacy to contact your provider’s office with a refill request. Medication refills are processed only during regular business hours and may not be available until the next business day. Your provider may request additional information or to have a follow-up visit with you prior to refilling your medication.   *Please Note: Medication refills are assigned a new Rx number when refilled electronically. Your pharmacy may indicate that no refills were authorized even though a new prescription for the same medication is available at the pharmacy. Please request the medicine by name  with the pharmacy before contacting your provider for a refill.           MyChart Access Code: Activation code not generated  Current MyChart Status: Active

## 2017-06-08 NOTE — PROGRESS NOTES
Subjective:   Nikki Aponte is a 47 y.o. female here today for  Follow up  47-year-old female with past medical history of breast cancer, Hodgkin's lymphoma s/p mantel radiation, hypothyroidism, recent diagnosis of paroxysmal atrophic fibrillation presented here for follow-up. It lasted patient patient is presenting for unknown origin of fever. There was no source of infection, temp low, tachycardic. Blood culture negative. She had just tingling throught at that time. She was reported periodic fever and chills at night. Last week she started to have some cough. She was seeing at urgent care, she was started on antibiotic, which today is the third day. She continues to have a dry cough and sometimes wheezing when she lies down. She is also taking cough medicine. She is feeling better. At last apt during ausculation I noted irregular rhythm, EKG confirmed atrial a-fib. Called cardiology. She was able to be seen at cardiology clinic. She is sinus rhythm today. She had started eliquis and she reports no side effects from that. She has upcoming apt to see Dr. Sharma. Overall she is feeling better today. She denies chest pain, palpitation, leg swellings, shortness of breath.    Current medicines (including changes today)  Current Outpatient Prescriptions   Medication Sig Dispense Refill   • doxycycline (VIBRAMYCIN) 100 MG Tab Take 1 Tab by mouth 2 times a day for 7 days. 14 Tab 0   • Hydrocod Polst-CPM Polst ER (TUSSIONEX) 10-8 MG/5ML Suspension Extended Release Take 5 mL by mouth at bedtime as needed for Cough or Rhinitis. 140 mL 0   • ALBUTEROL INH Inhale  by mouth 2 Times a Day.     • apixaban (ELIQUIS) 5mg Tab Take 1 Tab by mouth 2 Times a Day. 60 Tab 3   • PARAGARD INTRAUTERINE COPPER IUD by Intrauterine route.     • sertraline (ZOLOFT) 100 MG Tab Take 1 Tab by mouth every morning. 90 Tab 3   • levothyroxine (SYNTHROID) 100 MCG Tab Take 1 Tab by mouth Every morning on an empty stomach. 90 Tab 1     No current  facility-administered medications for this visit.     She  has a past medical history of Psychiatric problem; Heart murmur; Backpain; Endometriosis; Hodgkin disease (CMS-HCC); Migraine variant (4/4/2013); Depression (4/4/2013); Allergic rhinitis (4/4/2013); Aortic sclerosis (CMS-HCC) (5/8/2014); Bronchitis (2012); Unspecified disorder of thyroid; Hypothyroidism (4/4/2013); Arrhythmia; Breath shortness; Cancer (CMS-HCC) (1990); Breast cancer, right (CMS-HCC) (4/2016); and TONY (obstructive sleep apnea). She also has no past medical history of COPD.    Current Outpatient Prescriptions   Medication Sig Dispense Refill   • doxycycline (VIBRAMYCIN) 100 MG Tab Take 1 Tab by mouth 2 times a day for 7 days. 14 Tab 0   • Hydrocod Polst-CPM Polst ER (TUSSIONEX) 10-8 MG/5ML Suspension Extended Release Take 5 mL by mouth at bedtime as needed for Cough or Rhinitis. 140 mL 0   • ALBUTEROL INH Inhale  by mouth 2 Times a Day.     • apixaban (ELIQUIS) 5mg Tab Take 1 Tab by mouth 2 Times a Day. 60 Tab 3   • PARAGARD INTRAUTERINE COPPER IUD by Intrauterine route.     • sertraline (ZOLOFT) 100 MG Tab Take 1 Tab by mouth every morning. 90 Tab 3   • levothyroxine (SYNTHROID) 100 MCG Tab Take 1 Tab by mouth Every morning on an empty stomach. 90 Tab 1     No current facility-administered medications for this visit.       Allergies as of 06/08/2017 - Darrin as Reviewed 06/08/2017   Allergen Reaction Noted   • Codeine Itching 03/29/2010   • Penicillins  12/01/2014       Social History     Social History   • Marital Status: Single     Spouse Name: N/A   • Number of Children: N/A   • Years of Education: N/A     Occupational History   • Not on file.     Social History Main Topics   • Smoking status: Former Smoker     Quit date: 03/29/1991   • Smokeless tobacco: Never Used   • Alcohol Use: 0.0 oz/week     0 Standard drinks or equivalent per week      Comment: 1 per month   • Drug Use: No   • Sexual Activity: Yes     Birth Control/ Protection: IUD  "    Other Topics Concern   • Not on file     Social History Narrative        Family History   Problem Relation Age of Onset   • Stroke Mother    • Hypertension Mother    • Stroke Father    • Hypertension Father    • Cancer Father      prostate cancer   • Diabetes Neg Hx        Past Surgical History   Procedure Laterality Date   • Us-needle core bx-breast panel     • Partial mastectomy Right 5/10/2016     Procedure: PARTIAL MASTECTOMY;  Surgeon: Patricia Romero M.D.;  Location: SURGERY SAME DAY [facility];  Service:    • Breast biopsy Right 5/25/2016     Procedure: BREAST BIOPSY FOR: RE-EXCISION OF RT BREAST TISSUE ;  Surgeon: Patricia Romero M.D.;  Location: SURGERY SAME DAY [facility];  Service:    • Carpal tunnel release Left 3/31/2017     Procedure: CARPAL TUNNEL RELEASE;  Surgeon: Shay Powers M.D.;  Location: SURGERY [facility];  Service:    • Carpal tunnel release Right 4/21/2017     Procedure: CARPAL TUNNEL RELEASE;  Surgeon: Shay Powers M.D.;  Location: SURGERY [facility];  Service:        ROS   All systems reviewed are negative except for HPI         Objective:     Blood pressure 110/80, pulse 89, temperature 36.8 °C (98.2 °F), resp. rate 16, height 1.702 m (5' 7.01\"), weight 90.266 kg (199 lb), last menstrual period 05/08/2017, SpO2 95 %, not currently breastfeeding. Body mass index is 31.16 kg/(m^2).   Physical Exam:  Constitutional: Alert, no distress.  Skin: Warm, dry, good turgor, no rashes in visible areas.  Eye: Equal, round and reactive, conjunctiva clear, lids normal.  ENMT: Lips without lesions, good dentition, oropharynx clear.  Neck: Trachea midline, no masses, no thyromegaly. No cervical or supraclavicular lymphadenopathy  Respiratory: Unlabored respiratory effort, lungs clear to auscultation, no wheezes, no ronchi.  Cardiovascular: Normal S1, S2, no murmur, no edema.  Abdomen: Soft, non-tender, no masses, no hepatosplenomegaly.  Psych: Alert and oriented x3, normal " affect and mood.        Assessment and Plan:   The following treatment plan was discussed    1. Paroxysmal atrial fibrillation (CMS-HCC)  Proper medical management, sinus. On anticoagulation     2. Fever, unspecified fever cause  Resolved     3. Bronchitis  Improving       Followup: Return in about 3 months (around 9/8/2017) for Short.

## 2017-06-19 ENCOUNTER — HOSPITAL ENCOUNTER (OUTPATIENT)
Dept: RADIOLOGY | Facility: MEDICAL CENTER | Age: 47
End: 2017-06-19
Attending: OBSTETRICS & GYNECOLOGY
Payer: COMMERCIAL

## 2017-06-19 DIAGNOSIS — Z12.31 VISIT FOR SCREENING MAMMOGRAM: ICD-10-CM

## 2017-06-19 PROCEDURE — 77063 BREAST TOMOSYNTHESIS BI: CPT

## 2017-06-28 ENCOUNTER — OFFICE VISIT (OUTPATIENT)
Dept: CARDIOLOGY | Facility: MEDICAL CENTER | Age: 47
End: 2017-06-28
Payer: COMMERCIAL

## 2017-06-28 ENCOUNTER — HOSPITAL ENCOUNTER (EMERGENCY)
Facility: MEDICAL CENTER | Age: 47
End: 2017-06-28
Payer: COMMERCIAL

## 2017-06-28 VITALS
SYSTOLIC BLOOD PRESSURE: 92 MMHG | HEART RATE: 70 BPM | HEIGHT: 66 IN | BODY MASS INDEX: 32.3 KG/M2 | WEIGHT: 201 LBS | DIASTOLIC BLOOD PRESSURE: 62 MMHG | OXYGEN SATURATION: 94 %

## 2017-06-28 VITALS
HEART RATE: 60 BPM | OXYGEN SATURATION: 98 % | DIASTOLIC BLOOD PRESSURE: 73 MMHG | RESPIRATION RATE: 16 BRPM | TEMPERATURE: 97.2 F | SYSTOLIC BLOOD PRESSURE: 133 MMHG | WEIGHT: 200.4 LBS | HEIGHT: 67 IN | BODY MASS INDEX: 31.45 KG/M2

## 2017-06-28 DIAGNOSIS — I35.1 NONRHEUMATIC AORTIC VALVE INSUFFICIENCY: ICD-10-CM

## 2017-06-28 DIAGNOSIS — G47.33 OSA (OBSTRUCTIVE SLEEP APNEA): Chronic | ICD-10-CM

## 2017-06-28 DIAGNOSIS — I48.91 ATRIAL FIBRILLATION, UNSPECIFIED TYPE (HCC): ICD-10-CM

## 2017-06-28 PROBLEM — G47.30 SLEEP APNEA: Status: ACTIVE | Noted: 2017-06-28

## 2017-06-28 LAB — EKG IMPRESSION: NORMAL

## 2017-06-28 PROCEDURE — 99215 OFFICE O/P EST HI 40 MIN: CPT | Performed by: NURSE PRACTITIONER

## 2017-06-28 PROCEDURE — 302449 STATCHG TRIAGE ONLY (STATISTIC)

## 2017-06-28 PROCEDURE — 93005 ELECTROCARDIOGRAM TRACING: CPT

## 2017-06-28 ASSESSMENT — ENCOUNTER SYMPTOMS
FOCAL WEAKNESS: 0
ORTHOPNEA: 0
COUGH: 0
FALLS: 0
SHORTNESS OF BREATH: 0
DIZZINESS: 1
CLAUDICATION: 0
WHEEZING: 0
WEAKNESS: 1
HEADACHES: 0
HEMOPTYSIS: 0
SPUTUM PRODUCTION: 0
PALPITATIONS: 1
LOSS OF CONSCIOUSNESS: 0

## 2017-06-28 NOTE — MR AVS SNAPSHOT
"        Nikki Aponte   2017 4:00 PM   Office Visit   MRN: [id number]    Department:  [facility]   Dept Phone:  [phone]    Description:  Female : 1970   Provider:  SAVANAH Meehan           Reason for Visit     Hospital Follow-up           Allergies as of 2017     Allergen Noted Reactions    Codeine 2010   Itching    Penicillins 2014       States that it does not treat anything when she has been on it.       You were diagnosed with     Atrial fibrillation, unspecified type (CMS-[facility])   [4968257]       Nonrheumatic aortic valve insufficiency   [344095]       TONY (obstructive sleep apnea)   [153959]         Vital Signs     Blood Pressure Pulse Height Weight Body Mass Index Oxygen Saturation    92/62 mmHg 70 1.676 m (5' 5.98\") 91.173 kg (201 lb) 32.46 kg/m2 94%    Last Menstrual Period Smoking Status                2017 Former Smoker          Basic Information     Date Of Birth Sex Race Ethnicity Preferred Language    1970 Female  Non- English      Your appointments     Sep 12, 2017  4:00 PM   Established Patient with Ruddy Mansfield M.D.   [facility] (--)    [address]  [address]  [facility] [address] [phone]           You will be receiving a confirmation call a few days before your appointment from our automated call confirmation system.              Problem List              ICD-10-CM Priority Class Noted - Resolved    Hypothyroidism E03.9   2013 - Present    Depression F32.9   2013 - Present    History of Hodgkin's disease Z85.71   2013 - Present    Nonrheumatic aortic valve insufficiency I35.1   2014 - Present    Allergic rhinitis due to allergen J30.9   2014 - Present    S/P radiation therapy -  Mantle Radiation (Chronic) Z92.3   Unknown - Present    Paroxysmal atrial fibrillation (CMS-[facility]) I48.0   2016 - Present    Bilateral acute suppurative otitis media H66.003   2017 - Present   " Carpal tunnel syndrome on left G56.02   3/31/2017 - Present    Carpal tunnel syndrome on right G56.01   4/21/2017 - Present    Obesity (BMI 30-39.9) E66.9   5/4/2017 - Present    TONY (obstructive sleep apnea) (Chronic) G47.33   Unknown - Present    History of breast cancer Z85.3   5/17/2017 - Present    Bronchitis J40   6/8/2017 - Present    Sleep apnea G47.30   6/28/2017 - Present      Health Maintenance        Date Due Completion Dates    PAP SMEAR 3/16/2018 3/16/2015 (Postponed)    Override on 3/16/2015: Postponed    MAMMOGRAM 6/19/2018 6/19/2017, 4/14/2016, 3/31/2016, 1/10/2014    IMM DTaP/Tdap/Td Vaccine (2 - Td) 12/22/2026 12/22/2016            Results       Current Immunizations     Influenza Vaccine Quad Inj (Pf) 12/22/2016    Pneumococcal Vaccine (PCV7) Historical Data 5/31/2002    Tdap Vaccine 12/22/2016      Below and/or attached are the medications your provider expects you to take. Review all of your home medications and newly ordered medications with your provider and/or pharmacist. Follow medication instructions as directed by your provider and/or pharmacist. Please keep your medication list with you and share with your provider. Update the information when medications are discontinued, doses are changed, or new medications (including over-the-counter products) are added; and carry medication information at all times in the event of emergency situations     Allergies:  CODEINE - Itching     PENICILLINS - (reactions not documented)               Medications  Valid as of: June 28, 2017 -  5:12 PM    Generic Name Brand Name Tablet Size Instructions for use    Albuterol   Inhale  by mouth 2 Times a Day.        Apixaban (Tab) ELIQUIS 5mg Take 1 Tab by mouth 2 Times a Day.        Copper (IUD) PARAGARD INTRAUTERINE COPPER  by Intrauterine route.        Hydrocod Polst-Chlorphen Polst (Suspension Extended Release) TUSSIONEX 10-8 MG/5ML Take 5 mL by mouth at bedtime as needed for Cough or Rhinitis.         Levothyroxine Sodium (Tab) SYNTHROID 100 MCG Take 1 Tab by mouth Every morning on an empty stomach.        Sertraline HCl (Tab) ZOLOFT 100 MG Take 1 Tab by mouth every morning.        .                 Medicines prescribed today were sent to:     [facility]ARM[facility]Y #[id number] - [place] - [address] [place] [address]    Phone: [phone] Fax: [phone]    Open 24 Hours?: No      Medication refill instructions:       If your prescription bottle indicates you have medication refills left, it is not necessary to call your provider’s office. Please contact your pharmacy and they will refill your medication.    If your prescription bottle indicates you do not have any refills left, you may request refills at any time through one of the following ways: The online [employer] system (except Urgent Care), by calling your provider’s office, or by asking your pharmacy to contact your provider’s office with a refill request. Medication refills are processed only during regular business hours and may not be available until the next business day. Your provider may request additional information or to have a follow-up visit with you prior to refilling your medication.   *Please Note: Medication refills are assigned a new Rx number when refilled electronically. Your pharmacy may indicate that no refills were authorized even though a new prescription for the same medication is available at the pharmacy. Please request the medicine by name with the pharmacy before contacting your provider for a refill.           [employer] Access Code: Activation code not generated  Current [employer] Status: Active

## 2017-06-28 NOTE — ED NOTES
"Pt states has afib, reports intermittent x \"few months\". Pt states was at cardiologist and sent to ER. Pt c/o feeling tired, cp, denies n/v, +diaphoresis, +sob.   "

## 2017-06-28 NOTE — PROGRESS NOTES
"Subjective:   Nikki Aponte is a 47 y.o. female who presents today for evaluation of atrial fibrillation.    She is patient of  in our clinic, history of recent diagnosis of 8 paroxysmal atrial fibrillation. Patient was seen in her primary care provider's office about a month ago, where she was noted to have irregular rhythm. She was currently having symptoms of viral syndrome and was noted to have atrial fibrillation. She was evaluated by Dr. Sharma in the clinic and was transferred for direct admit to the hospital.  When she got to telemetry she was found to been in normal sinus rhythm. Her admission was discontinued and discharged on eliquis.    She has a history of Hodgkin's disease, breast cancer, and obstructive sleep apnea.    Patient is fatigue. Patient has noticed palpitations and lightheaded since this morning.     She has had no episodes of chest pain, shortness of breath, orthopnea, or peripheral edema.    Past Medical History   Diagnosis Date   • Psychiatric problem    • Heart murmur    • Backpain    • Endometriosis    • Hodgkin disease (CMS-HCC)    • Migraine variant 4/4/2013   • Depression 4/4/2013   • Allergic rhinitis 4/4/2013   • Aortic sclerosis (CMS-HCC) 5/8/2014   • Bronchitis 2012   • Unspecified disorder of thyroid    • Hypothyroidism 4/4/2013   • Arrhythmia      \"Skips a beat\"   • Breath shortness      \"I have scar tissue in right lung from previous radiation, use my inhaler about once a week\"   • Cancer (CMS-HCC) 1990     hodgkins, previous chemo, radiation , breast cancer right side, lumpectomy   • Breast cancer, right (CMS-HCC) 4/2016   • TONY (obstructive sleep apnea)      Past Surgical History   Procedure Laterality Date   • Us-needle core bx-breast panel     • Partial mastectomy Right 5/10/2016     Procedure: PARTIAL MASTECTOMY;  Surgeon: Patricia Romero M.D.;  Location: SURGERY SAME DAY [facility];  Service:    • Breast biopsy Right 5/25/2016     Procedure: BREAST BIOPSY " FOR: RE-EXCISION OF RT BREAST TISSUE ;  Surgeon: Patricia Romero M.D.;  Location: SURGERY SAME DAY [facility];  Service:    • Carpal tunnel release Left 3/31/2017     Procedure: CARPAL TUNNEL RELEASE;  Surgeon: Shay Powers M.D.;  Location: SURGERY [facility];  Service:    • Carpal tunnel release Right 4/21/2017     Procedure: CARPAL TUNNEL RELEASE;  Surgeon: Shay Powers M.D.;  Location: SURGERY [facility];  Service:      Family History   Problem Relation Age of Onset   • Stroke Mother    • Hypertension Mother    • Stroke Father    • Hypertension Father    • Cancer Father      prostate cancer   • Diabetes Neg Hx      History   Smoking status   • Former Smoker   • Quit date: 03/29/1991   Smokeless tobacco   • Never Used     Allergies   Allergen Reactions   • Codeine Itching   • Penicillins      States that it does not treat anything when she has been on it.      Outpatient Encounter Prescriptions as of 6/28/2017   Medication Sig Dispense Refill   • Hydrocod Polst-CPM Polst ER (TUSSIONEX) 10-8 MG/5ML Suspension Extended Release Take 5 mL by mouth at bedtime as needed for Cough or Rhinitis. 140 mL 0   • ALBUTEROL INH Inhale  by mouth 2 Times a Day.     • apixaban (ELIQUIS) 5mg Tab Take 1 Tab by mouth 2 Times a Day. 60 Tab 3   • PARAGARD INTRAUTERINE COPPER IUD by Intrauterine route.     • sertraline (ZOLOFT) 100 MG Tab Take 1 Tab by mouth every morning. 90 Tab 3   • levothyroxine (SYNTHROID) 100 MCG Tab Take 1 Tab by mouth Every morning on an empty stomach. 90 Tab 1     No facility-administered encounter medications on file as of 6/28/2017.     Review of Systems   Constitutional: Positive for malaise/fatigue.   Respiratory: Negative for cough, hemoptysis, sputum production, shortness of breath and wheezing.    Cardiovascular: Positive for palpitations. Negative for chest pain, orthopnea, claudication and leg swelling.   Musculoskeletal: Negative for falls.   Neurological: Positive for dizziness  "and weakness. Negative for focal weakness, loss of consciousness and headaches.   All other systems reviewed and are negative.       Objective:   BP 92/62 mmHg  Pulse 70  Ht 1.676 m (5' 5.98\")  Wt 91.173 kg (201 lb)  BMI 32.46 kg/m2  SpO2 94%  LMP 05/08/2017    Physical Exam   Constitutional: She is oriented to person, place, and time. She appears well-developed and well-nourished. No distress.   Fatigue    HENT:   Head: Normocephalic.   Neck: Normal range of motion. No JVD present.   Cardiovascular: Normal heart sounds.  An irregularly irregular rhythm present. Tachycardia present.    No murmur heard.  Pulmonary/Chest: Effort normal and breath sounds normal. No respiratory distress. She has no wheezes.   Abdominal: Bowel sounds are normal.   Musculoskeletal: She exhibits no edema or tenderness.   Neurological: She is alert and oriented to person, place, and time.   Skin: Skin is warm and dry.   Psychiatric: Her behavior is normal. Judgment normal.   Nursing note and vitals reviewed.        Echocardiography Laboratory  2/21/2017  CONCLUSIONS  Left ventricular ejection fraction is visually estimated to be 60%.   Normal regional wall motion. Grade I diastolic dysfunction.  Mild mitral regurgitation.  Moderate aortic insufficiency.  Estimated right ventricular systolic pressure  is 30 mmHg.  Echo images from 9/16/14 showed mild aortic insufficiency.    Assessment:     1. Atrial fibrillation, unspecified type (CMS-HCC)  [facility] EKG (Clinic Performed)   2. Nonrheumatic aortic valve insufficiency     3. TONY (obstructive sleep apnea)         Medical Decision Making:  Today's Assessment / Status / Plan:     1. Atrial fibrillation:   - EKG today reveals Atrial fibrillation with rate 136-179.  - fatigue and hypotensive. Her blood pressure typically runs 120/80.   - continue oral anticoagulation     2. Aortic valve insufficiency:  - moderate aortic insufficiency     3. Obstructive sleep apnea:  - has been diagnosed " but has not been wearing her CPAP machine.    After discussing with Dr. Sharma, we agree patient should be sent to the ER. Patient sent to emergency room for further treatment of atrial fibrillation. She has been compliant with her eliquis.     Collaborating Provider: Dr. Sharma

## 2017-06-29 LAB — EKG IMPRESSION: NORMAL

## 2017-07-01 ENCOUNTER — PATIENT MESSAGE (OUTPATIENT)
Dept: MEDICAL GROUP | Facility: PHYSICIAN GROUP | Age: 47
End: 2017-07-01

## 2017-07-18 ENCOUNTER — HOSPITAL ENCOUNTER (OUTPATIENT)
Dept: LAB | Facility: MEDICAL CENTER | Age: 47
End: 2017-07-18
Attending: NURSE PRACTITIONER
Payer: COMMERCIAL

## 2017-07-18 ENCOUNTER — OFFICE VISIT (OUTPATIENT)
Dept: CARDIOLOGY | Facility: MEDICAL CENTER | Age: 47
End: 2017-07-18
Payer: COMMERCIAL

## 2017-07-18 VITALS
HEIGHT: 67 IN | HEART RATE: 100 BPM | SYSTOLIC BLOOD PRESSURE: 142 MMHG | WEIGHT: 199 LBS | BODY MASS INDEX: 31.23 KG/M2 | OXYGEN SATURATION: 91 % | DIASTOLIC BLOOD PRESSURE: 82 MMHG

## 2017-07-18 DIAGNOSIS — G47.30 SLEEP APNEA, UNSPECIFIED TYPE: ICD-10-CM

## 2017-07-18 DIAGNOSIS — E66.9 OBESITY (BMI 30-39.9): ICD-10-CM

## 2017-07-18 DIAGNOSIS — I51.89 DIASTOLIC DYSFUNCTION: ICD-10-CM

## 2017-07-18 DIAGNOSIS — I48.0 PAROXYSMAL ATRIAL FIBRILLATION (HCC): ICD-10-CM

## 2017-07-18 DIAGNOSIS — I35.1 NONRHEUMATIC AORTIC VALVE INSUFFICIENCY: ICD-10-CM

## 2017-07-18 DIAGNOSIS — G47.33 OSA (OBSTRUCTIVE SLEEP APNEA): Chronic | ICD-10-CM

## 2017-07-18 LAB
ALBUMIN SERPL BCP-MCNC: 4.1 G/DL (ref 3.2–4.9)
ALBUMIN/GLOB SERPL: 1.5 G/DL
ALP SERPL-CCNC: 83 U/L (ref 30–99)
ALT SERPL-CCNC: 30 U/L (ref 2–50)
ANION GAP SERPL CALC-SCNC: 10 MMOL/L (ref 0–11.9)
AST SERPL-CCNC: 18 U/L (ref 12–45)
BILIRUB SERPL-MCNC: 0.4 MG/DL (ref 0.1–1.5)
BNP SERPL-MCNC: 215 PG/ML (ref 0–100)
BUN SERPL-MCNC: 10 MG/DL (ref 8–22)
CALCIUM SERPL-MCNC: 9.5 MG/DL (ref 8.5–10.5)
CHLORIDE SERPL-SCNC: 104 MMOL/L (ref 96–112)
CO2 SERPL-SCNC: 23 MMOL/L (ref 20–33)
CREAT SERPL-MCNC: 0.72 MG/DL (ref 0.5–1.4)
EKG IMPRESSION: NORMAL
ERYTHROCYTE [DISTWIDTH] IN BLOOD BY AUTOMATED COUNT: 43.9 FL (ref 35.9–50)
GFR SERPL CREATININE-BSD FRML MDRD: >60 ML/MIN/1.73 M 2
GLOBULIN SER CALC-MCNC: 2.8 G/DL (ref 1.9–3.5)
GLUCOSE SERPL-MCNC: 105 MG/DL (ref 65–99)
HCT VFR BLD AUTO: 42.4 % (ref 37–47)
HGB BLD-MCNC: 14 G/DL (ref 12–16)
MAGNESIUM SERPL-MCNC: 1.8 MG/DL (ref 1.5–2.5)
MCH RBC QN AUTO: 29.9 PG (ref 27–33)
MCHC RBC AUTO-ENTMCNC: 33 G/DL (ref 33.6–35)
MCV RBC AUTO: 90.4 FL (ref 81.4–97.8)
PLATELET # BLD AUTO: 268 K/UL (ref 164–446)
PMV BLD AUTO: 11.4 FL (ref 9–12.9)
POTASSIUM SERPL-SCNC: 3.8 MMOL/L (ref 3.6–5.5)
PROT SERPL-MCNC: 6.9 G/DL (ref 6–8.2)
RBC # BLD AUTO: 4.69 M/UL (ref 4.2–5.4)
SODIUM SERPL-SCNC: 137 MMOL/L (ref 135–145)
T4 FREE SERPL-MCNC: 0.84 NG/DL (ref 0.53–1.43)
TSH SERPL DL<=0.005 MIU/L-ACNC: 3.91 UIU/ML (ref 0.3–3.7)
WBC # BLD AUTO: 9.3 K/UL (ref 4.8–10.8)

## 2017-07-18 PROCEDURE — 85027 COMPLETE CBC AUTOMATED: CPT

## 2017-07-18 PROCEDURE — 93000 ELECTROCARDIOGRAM COMPLETE: CPT | Performed by: NURSE PRACTITIONER

## 2017-07-18 PROCEDURE — 84443 ASSAY THYROID STIM HORMONE: CPT

## 2017-07-18 PROCEDURE — 99214 OFFICE O/P EST MOD 30 MIN: CPT | Performed by: NURSE PRACTITIONER

## 2017-07-18 PROCEDURE — 80053 COMPREHEN METABOLIC PANEL: CPT

## 2017-07-18 PROCEDURE — 83735 ASSAY OF MAGNESIUM: CPT

## 2017-07-18 PROCEDURE — 84439 ASSAY OF FREE THYROXINE: CPT

## 2017-07-18 PROCEDURE — 83880 ASSAY OF NATRIURETIC PEPTIDE: CPT

## 2017-07-18 PROCEDURE — 36415 COLL VENOUS BLD VENIPUNCTURE: CPT

## 2017-07-18 ASSESSMENT — ENCOUNTER SYMPTOMS
FEVER: 0
ABDOMINAL PAIN: 0
CHILLS: 1
PALPITATIONS: 1
SHORTNESS OF BREATH: 1
WEAKNESS: 1
ORTHOPNEA: 0
COUGH: 0
MYALGIAS: 0
CLAUDICATION: 0
DIZZINESS: 0
PND: 0

## 2017-07-18 NOTE — Clinical Note
"     [facility]   [address], [address]  [place] [address]  Phone: [phone]  Fax: [phone]              Nikki Aponte  1970    Encounter Date: 7/18/2017    SAVANAH Jimenez          PROGRESS NOTE:  Subjective:   Nikki Aponte is a 47 y.o. female who presents today for follow up on continued persistent afib.    She is a patient of Dr. Sharma in our office. Hx of PAF, hypothyroid, obesity, TONY (un-treated), moderate AI, and breast CA with radiation therapy.    She is overall feeling poorly. She is functional and has her good days and bad days. She has generalized fatigue and shortness of breath at rest and with exertion. When she lays down at night, she feels a heaviness on her chest as well.    She feels her heart racing, sometimes more than others.    She does have mild peripheral edema in her lower extremities as well.    She has had no episodes of dizziness/lightheadedness.    Past Medical History   Diagnosis Date   • Psychiatric problem    • Heart murmur    • Backpain    • Endometriosis    • Hodgkin disease (CMS-HCC)    • Migraine variant 4/4/2013   • Depression 4/4/2013   • Allergic rhinitis 4/4/2013   • Aortic sclerosis (CMS-[facility]) 5/8/2014   • Bronchitis 2012   • Hypothyroidism 4/4/2013   • Arrhythmia      \"Skips a beat\"   • Breath shortness      \"I have scar tissue in right lung from previous radiation, use my inhaler about once a week\"   • Cancer (CMS-HCC) 1990     hodgkins, previous chemo, radiation , breast cancer right side, lumpectomy   • Breast cancer, right (CMS-[facility]) 4/2016   • Paroxysmal atrial fibrillation (CMS-[facility])    • Sleep apnea    • Valvular heart disease      moderate AI      Past Surgical History   Procedure Laterality Date   • Us-needle core bx-breast panel     • Partial mastectomy Right 5/10/2016     Procedure: PARTIAL MASTECTOMY;  Surgeon: Patricia Romero M.D.;  Location: SURGERY SAME DAY [facility];  Service:    • " Breast biopsy Right 5/25/2016     Procedure: BREAST BIOPSY FOR: RE-EXCISION OF RT BREAST TISSUE ;  Surgeon: Patricia Romero M.D.;  Location: SURGERY SAME DAY [facility];  Service:    • Carpal tunnel release Left 3/31/2017     Procedure: CARPAL TUNNEL RELEASE;  Surgeon: Shay Powers M.D.;  Location: SURGERY [facility];  Service:    • Carpal tunnel release Right 4/21/2017     Procedure: CARPAL TUNNEL RELEASE;  Surgeon: Shay Powers M.D.;  Location: SURGERY [facility];  Service:      Family History   Problem Relation Age of Onset   • Stroke Mother    • Hypertension Mother    • Stroke Father    • Hypertension Father    • Cancer Father      prostate cancer   • Diabetes Neg Hx      History   Smoking status   • Former Smoker   • Quit date: 03/29/1991   Smokeless tobacco   • Never Used     Allergies   Allergen Reactions   • Codeine Itching   • Penicillins      States that it does not treat anything when she has been on it.      Outpatient Encounter Prescriptions as of 7/18/2017   Medication Sig Dispense Refill   • dronedarone (MULTAQ) 400 MG Tab Take 1 Tab by mouth 2 times a day, with meals. 60 Tab 11   • Hydrocod Polst-CPM Polst ER (TUSSIONEX) 10-8 MG/5ML Suspension Extended Release Take 5 mL by mouth at bedtime as needed for Cough or Rhinitis. 140 mL 0   • ALBUTEROL INH Inhale  by mouth 2 Times a Day.     • apixaban (ELIQUIS) 5mg Tab Take 1 Tab by mouth 2 Times a Day. 60 Tab 3   • PARAGARD INTRAUTERINE COPPER IUD by Intrauterine route.     • sertraline (ZOLOFT) 100 MG Tab Take 1 Tab by mouth every morning. 90 Tab 3   • levothyroxine (SYNTHROID) 100 MCG Tab Take 1 Tab by mouth Every morning on an empty stomach. 90 Tab 1     No facility-administered encounter medications on file as of 7/18/2017.     Review of Systems   Constitutional: Positive for chills and malaise/fatigue. Negative for fever.   Respiratory: Positive for shortness of breath. Negative for cough.         Exertional and at rest     "  Cardiovascular: Positive for chest pain and palpitations. Negative for orthopnea, claudication, leg swelling and PND.        Chest pain when laying down at night    Gastrointestinal: Negative for abdominal pain.   Musculoskeletal: Negative for myalgias.   Neurological: Positive for weakness. Negative for dizziness.   All other systems reviewed and are negative.       Objective:   /82 mmHg  Pulse 100  Ht 1.689 m (5' 6.5\")  Wt 90.266 kg (199 lb)  BMI 31.64 kg/m2  SpO2 91%    Physical Exam   Constitutional: She is oriented to person, place, and time. She appears well-developed. No distress.   obese   HENT:   Head: Normocephalic and atraumatic.   Eyes: EOM are normal.   Neck: Normal range of motion. No JVD present.   Cardiovascular: Normal heart sounds, intact distal pulses and normal pulses.  An irregularly irregular rhythm present. Tachycardia present.    No murmur heard.  Pulmonary/Chest: Effort normal and breath sounds normal. No respiratory distress.   Abdominal: Soft. Bowel sounds are normal.   Musculoskeletal: Normal range of motion. She exhibits no edema.   Neurological: She is alert and oriented to person, place, and time.   Skin: Skin is warm and dry.   Psychiatric: She has a normal mood and affect.   Nursing note and vitals reviewed.    2017 CAROTID DUPLEX   1.  There mild/moderate atherosclerotic plaque.  Plaque is located in both carotid bulbs and proximal right internal carotid arteries  Bilateral intimal thickening demonstrated..  Plaque characterization:  calcific  2.  There is no evidence of carotid occlusion.  3. Vertebral arteries demonstrate antegrade flow.  4. Diameter reduction in the internal carotid arteries: less than 50%. There is no hemodynamically significant stenosis.    2017 ECHO CONCLUSIONS  Left ventricular ejection fraction is visually estimated to be 60%.   Normal regional wall motion. Grade I diastolic dysfunction.  Mild mitral regurgitation.  Moderate aortic " insufficiency.  Estimated right ventricular systolic pressure  is 30 mmHg.  Echo images from 9/16/14 showed mild aortic insufficiency.    Lab Results   Component Value Date/Time    CHOLESTEROL,* 05/03/2017 10:12 AM    * 05/03/2017 10:12 AM    HDL 53 05/03/2017 10:12 AM    TRIGLYCERIDES 179* 05/03/2017 10:12 AM       Lab Results   Component Value Date/Time    SODIUM 138 05/17/2017 12:45 PM    POTASSIUM 4.0 05/17/2017 12:45 PM    CHLORIDE 105 05/17/2017 12:45 PM    CO2 24 05/17/2017 12:45 PM    GLUCOSE 99 05/17/2017 12:45 PM    BUN 13 05/17/2017 12:45 PM    CREATININE 0.82 05/17/2017 12:45 PM     Lab Results   Component Value Date/Time    ALKALINE PHOSPHATASE 88 05/17/2017 12:45 PM    AST(SGOT) 27 05/17/2017 12:45 PM    ALT(SGPT) 32 05/17/2017 12:45 PM    TOTAL BILIRUBIN 0.4 05/17/2017 12:45 PM      Lab Results   Component Value Date/Time    PT 11.2 03/29/2010 05:15 PM    INR 0.97 03/29/2010 05:15 PM    No results found for: BNPBTYPENAT     Assessment:     1. Nonrheumatic aortic valve insufficiency     2. Obesity (BMI 30-39.9)     3. TONY (obstructive sleep apnea)     4. Paroxysmal atrial fibrillation (CMS-[facility])  EKG    dronedarone (MULTAQ) 400 MG Tab   5. Sleep apnea, unspecified type       Medical Decision Making:  Today's Assessment / Status / Plan:     1. Moderate AI, echo shows good function. FU with yearly echo for review.    2. Obesity, recommend weight loss with lifestyle changes. Discuss once afib controlled.    3. TONY, previous tx, not tolerating mouthguard. Recommend FU with pulmonology for review. Consider cause for PAF.    4. PAF, continued paroxysmal since May of this year. Previously sent for cardioversions X2 (left before tx and SR with one admission), discussed with Lachelle HARPER-recommendations to trial multaq 400 mg BID, continue eliquis 5 mg BID and repeat EKG on Friday. If remains in afib, send to hospital for cardioversion with admission and medication adjustment. No bleeding on  eliquis. Patient is made aware if symptoms worsen-she will go to the ER for evaluation. She agrees to this. Labs tomorrow-thyroid panel, mag, CMP, and BNP.    FU in clinic in 3 days with RT    Patient verbalizes understanding and agrees with the plan of care.     Collaborating MD: Lachelle HARPER        No Recipients

## 2017-07-18 NOTE — PROGRESS NOTES
"Subjective:   Nikki Aponte is a 47 y.o. female who presents today for follow up on continued persistent afib.    She is a patient of Dr. Sharma in our office. Hx of PAF, hypothyroid, obesity, TONY (un-treated), moderate AI, and breast CA with radiation therapy.    She is overall feeling poorly. She is functional and has her good days and bad days. She has generalized fatigue and shortness of breath at rest and with exertion. When she lays down at night, she feels a heaviness on her chest as well.    She feels her heart racing, sometimes more than others.    She does have mild peripheral edema in her lower extremities as well.    She has had no episodes of dizziness/lightheadedness.    Past Medical History   Diagnosis Date   • Psychiatric problem    • Heart murmur    • Backpain    • Endometriosis    • Hodgkin disease (CMS-HCC)    • Migraine variant 4/4/2013   • Depression 4/4/2013   • Allergic rhinitis 4/4/2013   • Aortic sclerosis (CMS-HCC) 5/8/2014   • Bronchitis 2012   • Hypothyroidism 4/4/2013   • Arrhythmia      \"Skips a beat\"   • Breath shortness      \"I have scar tissue in right lung from previous radiation, use my inhaler about once a week\"   • Cancer (CMS-[facility]) 1990     hodgkins, previous chemo, radiation , breast cancer right side, lumpectomy   • Breast cancer, right (CMS-HCC) 4/2016   • Paroxysmal atrial fibrillation (CMS-[facility])    • Sleep apnea    • Valvular heart disease      moderate AI      Past Surgical History   Procedure Laterality Date   • Us-needle core bx-breast panel     • Partial mastectomy Right 5/10/2016     Procedure: PARTIAL MASTECTOMY;  Surgeon: Patricia Romero M.D.;  Location: SURGERY SAME DAY [facility];  Service:    • Breast biopsy Right 5/25/2016     Procedure: BREAST BIOPSY FOR: RE-EXCISION OF RT BREAST TISSUE ;  Surgeon: Patricia Romero M.D.;  Location: SURGERY SAME DAY [facility];  Service:    • Carpal tunnel release Left 3/31/2017     Procedure: CARPAL TUNNEL RELEASE;  Surgeon: " Shay Powers M.D.;  Location: SURGERY [facility];  Service:    • Carpal tunnel release Right 4/21/2017     Procedure: CARPAL TUNNEL RELEASE;  Surgeon: Shay Powers M.D.;  Location: SURGERY [facility];  Service:      Family History   Problem Relation Age of Onset   • Stroke Mother    • Hypertension Mother    • Stroke Father    • Hypertension Father    • Cancer Father      prostate cancer   • Diabetes Neg Hx      History   Smoking status   • Former Smoker   • Quit date: 03/29/1991   Smokeless tobacco   • Never Used     Allergies   Allergen Reactions   • Codeine Itching   • Penicillins      States that it does not treat anything when she has been on it.      Outpatient Encounter Prescriptions as of 7/18/2017   Medication Sig Dispense Refill   • dronedarone (MULTAQ) 400 MG Tab Take 1 Tab by mouth 2 times a day, with meals. 60 Tab 11   • Hydrocod Polst-CPM Polst ER (TUSSIONEX) 10-8 MG/5ML Suspension Extended Release Take 5 mL by mouth at bedtime as needed for Cough or Rhinitis. 140 mL 0   • ALBUTEROL INH Inhale  by mouth 2 Times a Day.     • apixaban (ELIQUIS) 5mg Tab Take 1 Tab by mouth 2 Times a Day. 60 Tab 3   • PARAGARD INTRAUTERINE COPPER IUD by Intrauterine route.     • sertraline (ZOLOFT) 100 MG Tab Take 1 Tab by mouth every morning. 90 Tab 3   • levothyroxine (SYNTHROID) 100 MCG Tab Take 1 Tab by mouth Every morning on an empty stomach. 90 Tab 1     No facility-administered encounter medications on file as of 7/18/2017.     Review of Systems   Constitutional: Positive for chills and malaise/fatigue. Negative for fever.   Respiratory: Positive for shortness of breath. Negative for cough.         Exertional and at rest    Cardiovascular: Positive for chest pain and palpitations. Negative for orthopnea, claudication, leg swelling and PND.        Chest pain when laying down at night    Gastrointestinal: Negative for abdominal pain.   Musculoskeletal: Negative for myalgias.   Neurological: Positive  "for weakness. Negative for dizziness.   All other systems reviewed and are negative.       Objective:   /82 mmHg  Pulse 100  Ht 1.689 m (5' 6.5\")  Wt 90.266 kg (199 lb)  BMI 31.64 kg/m2  SpO2 91%    Physical Exam   Constitutional: She is oriented to person, place, and time. She appears well-developed. No distress.   obese   HENT:   Head: Normocephalic and atraumatic.   Eyes: EOM are normal.   Neck: Normal range of motion. No JVD present.   Cardiovascular: Normal heart sounds, intact distal pulses and normal pulses.  An irregularly irregular rhythm present. Tachycardia present.    No murmur heard.  Pulmonary/Chest: Effort normal and breath sounds normal. No respiratory distress.   Abdominal: Soft. Bowel sounds are normal.   Musculoskeletal: Normal range of motion. She exhibits no edema.   Neurological: She is alert and oriented to person, place, and time.   Skin: Skin is warm and dry.   Psychiatric: She has a normal mood and affect.   Nursing note and vitals reviewed.    2017 CAROTID DUPLEX   1.  There mild/moderate atherosclerotic plaque.  Plaque is located in both carotid bulbs and proximal right internal carotid arteries  Bilateral intimal thickening demonstrated..  Plaque characterization:  calcific  2.  There is no evidence of carotid occlusion.  3. Vertebral arteries demonstrate antegrade flow.  4. Diameter reduction in the internal carotid arteries: less than 50%. There is no hemodynamically significant stenosis.    2017 ECHO CONCLUSIONS  Left ventricular ejection fraction is visually estimated to be 60%.   Normal regional wall motion. Grade I diastolic dysfunction.  Mild mitral regurgitation.  Moderate aortic insufficiency.  Estimated right ventricular systolic pressure  is 30 mmHg.  Echo images from 9/16/14 showed mild aortic insufficiency.    Lab Results   Component Value Date/Time    CHOLESTEROL,* 05/03/2017 10:12 AM    * 05/03/2017 10:12 AM    HDL 53 05/03/2017 10:12 AM    " TRIGLYCERIDES 179* 05/03/2017 10:12 AM       Lab Results   Component Value Date/Time    SODIUM 138 05/17/2017 12:45 PM    POTASSIUM 4.0 05/17/2017 12:45 PM    CHLORIDE 105 05/17/2017 12:45 PM    CO2 24 05/17/2017 12:45 PM    GLUCOSE 99 05/17/2017 12:45 PM    BUN 13 05/17/2017 12:45 PM    CREATININE 0.82 05/17/2017 12:45 PM     Lab Results   Component Value Date/Time    ALKALINE PHOSPHATASE 88 05/17/2017 12:45 PM    AST(SGOT) 27 05/17/2017 12:45 PM    ALT(SGPT) 32 05/17/2017 12:45 PM    TOTAL BILIRUBIN 0.4 05/17/2017 12:45 PM      Lab Results   Component Value Date/Time    PT 11.2 03/29/2010 05:15 PM    INR 0.97 03/29/2010 05:15 PM    No results found for: BNPBTYPENAT     Assessment:     1. Nonrheumatic aortic valve insufficiency     2. Obesity (BMI 30-39.9)     3. TONY (obstructive sleep apnea)     4. Paroxysmal atrial fibrillation (CMS-HCC)  EKG    dronedarone (MULTAQ) 400 MG Tab   5. Sleep apnea, unspecified type       Medical Decision Making:  Today's Assessment / Status / Plan:     1. Moderate AI, echo shows good function. FU with yearly echo for review.    2. Obesity, recommend weight loss with lifestyle changes. Discuss once afib controlled.    3. TONY, previous tx, not tolerating mouthguard. Recommend FU with pulmonology for review. Consider cause for PAF.    4. PAF, continued paroxysmal since May of this year. Previously sent for cardioversions X2 (left before tx and SR with one admission), discussed with Lachelle HARPER-recommendations to trial multaq 400 mg BID, continue eliquis 5 mg BID and repeat EKG on Friday. If remains in afib, send to hospital for cardioversion with admission and medication adjustment. No bleeding on eliquis. Patient is made aware if symptoms worsen-she will go to the ER for evaluation. She agrees to this. Labs tomorrow-thyroid panel, mag, CMP, and BNP.    FU in clinic in 3 days with RT    Patient verbalizes understanding and agrees with the plan of care.     Collaborating MD: Lachelle  MD

## 2017-07-18 NOTE — MR AVS SNAPSHOT
"        Nikki Aponte   2017 9:20 AM   Office Visit   MRN: [id number]    Department:  [facility]   Dept Phone:  [phone]    Description:  Female : 1970   Provider:  SAVANAH Jimenez           Reason for Visit     Hospital Follow-up           Allergies as of 2017     Allergen Noted Reactions    Codeine 2010   Itching    Penicillins 2014       States that it does not treat anything when she has been on it.       You were diagnosed with     Nonrheumatic aortic valve insufficiency   [871210]       Obesity (BMI 30-39.9)   [874825]       TONY (obstructive sleep apnea)   [423659]       Paroxysmal atrial fibrillation (CMS-HCC)   [262792]       Sleep apnea, unspecified type   [6945935]         Vital Signs     Blood Pressure Pulse Height Weight Body Mass Index Oxygen Saturation    142/82 mmHg 100 1.689 m (5' 6.5\") 90.266 kg (199 lb) 31.64 kg/m2 91%    Smoking Status                   Former Smoker           Basic Information     Date Of Birth Sex Race Ethnicity Preferred Language    1970 Female  Non- English      Your appointments     2017  3:20 PM   Established Patient with Ruddy Mansfield M.D.   [facility] [place] (--)    [address]  [address]  [place] [phone]           You will be receiving a confirmation call a few days before your appointment from our automated call confirmation system.   Please bring to your appointment; a photo ID, copies of your insurance card, all medication bottles and any co-pay that you are responsible for.  Please allow 45-60 minutes to complete your scheduled appointment.            Sep 12, 2017  4:00 PM   Established Patient with [facility] (--)    [address]  [address]  [place] [id number] [phone]           You will be receiving a confirmation call a few days before your appointment from our automated call confirmation system.              Problem " List              ICD-10-CM Priority Class Noted - Resolved    Hypothyroidism E03.9 Low  4/4/2013 - Present    Depression F32.9 Low  4/4/2013 - Present    History of Hodgkin's disease Z85.71 Low  12/30/2013 - Present    Nonrheumatic aortic valve insufficiency I35.1 Medium  5/8/2014 - Present    Allergic rhinitis due to allergen J30.9 Low  5/8/2014 - Present    S/P radiation therapy - 1990 Mantle Radiation (Chronic) Z92.3 Low  Unknown - Present    Paroxysmal atrial fibrillation (CMS-HCC) I48.0 Medium  12/22/2016 - Present    Bilateral acute suppurative otitis media H66.003 Low  1/5/2017 - Present    Carpal tunnel syndrome on left G56.02 Low  3/31/2017 - Present    Carpal tunnel syndrome on right G56.01 Low  4/21/2017 - Present    Obesity (BMI 30-39.9) E66.9 Medium  5/4/2017 - Present    TONY (obstructive sleep apnea) (Chronic) G47.33 Medium  Unknown - Present    History of breast cancer Z85.3 Low  5/17/2017 - Present    Bronchitis J40 Low  6/8/2017 - Present    Sleep apnea G47.30 Medium  6/28/2017 - Present      Health Maintenance        Date Due Completion Dates    IMM INFLUENZA (1) 9/1/2017 12/22/2016    PAP SMEAR 3/16/2018 3/16/2015 (Postponed)    Override on 3/16/2015: Postponed    MAMMOGRAM 6/19/2018 6/19/2017, 4/14/2016, 3/31/2016, 1/10/2014    IMM DTaP/Tdap/Td Vaccine (2 - Td) 12/22/2026 12/22/2016            Results       Current Immunizations     Influenza Vaccine Quad Inj (Pf) 12/22/2016    Pneumococcal Vaccine (PCV7) Historical Data 5/31/2002    Tdap Vaccine 12/22/2016      Below and/or attached are the medications your provider expects you to take. Review all of your home medications and newly ordered medications with your provider and/or pharmacist. Follow medication instructions as directed by your provider and/or pharmacist. Please keep your medication list with you and share with your provider. Update the information when medications are discontinued, doses are changed, or new medications (including  over-the-counter products) are added; and carry medication information at all times in the event of emergency situations     Allergies:  CODEINE - Itching     PENICILLINS - (reactions not documented)               Medications  Valid as of: July 18, 2017 - 10:07 AM    Generic Name Brand Name Tablet Size Instructions for use    Albuterol   Inhale  by mouth 2 Times a Day.        Apixaban (Tab) ELIQUIS 5mg Take 1 Tab by mouth 2 Times a Day.        Copper (IUD) PARAGARD INTRAUTERINE COPPER  by Intrauterine route.        Dronedarone HCl (Tab) MULTAQ 400 MG Take 1 Tab by mouth 2 times a day, with meals.        Hydrocod Polst-Chlorphen Polst (Suspension Extended Release) TUSSIONEX 10-8 MG/5ML Take 5 mL by mouth at bedtime as needed for Cough or Rhinitis.        Levothyroxine Sodium (Tab) SYNTHROID 100 MCG Take 1 Tab by mouth Every morning on an empty stomach.        Sertraline HCl (Tab) ZOLOFT 100 MG Take 1 Tab by mouth every morning.        .                 Medicines prescribed today were sent to:     [facility]ACY #[id number] - [place] - [address] [place] [address]    Phone: [phone] Fax: [phone]    Open 24 Hours?: No      Medication refill instructions:       If your prescription bottle indicates you have medication refills left, it is not necessary to call your provider’s office. Please contact your pharmacy and they will refill your medication.    If your prescription bottle indicates you do not have any refills left, you may request refills at any time through one of the following ways: The online [employer] system (except Urgent Care), by calling your provider’s office, or by asking your pharmacy to contact your provider’s office with a refill request. Medication refills are processed only during regular business hours and may not be available until the next business day. Your provider may request additional information or to have a follow-up visit with you prior to refilling your medication.      *Please Note: Medication refills are assigned a new Rx number when refilled electronically. Your pharmacy may indicate that no refills were authorized even though a new prescription for the same medication is available at the pharmacy. Please request the medicine by name with the pharmacy before contacting your provider for a refill.           [email or web address] Access Code: Activation code not generated  Current [employer]RST Status: Active

## 2017-07-20 ENCOUNTER — TELEPHONE (OUTPATIENT)
Dept: CARDIOLOGY | Facility: MEDICAL CENTER | Age: 47
End: 2017-07-20

## 2017-07-20 ENCOUNTER — OFFICE VISIT (OUTPATIENT)
Dept: MEDICAL GROUP | Facility: PHYSICIAN GROUP | Age: 47
End: 2017-07-20
Payer: COMMERCIAL

## 2017-07-20 VITALS
RESPIRATION RATE: 16 BRPM | SYSTOLIC BLOOD PRESSURE: 100 MMHG | TEMPERATURE: 98.1 F | HEART RATE: 84 BPM | HEIGHT: 66 IN | DIASTOLIC BLOOD PRESSURE: 62 MMHG | BODY MASS INDEX: 31.66 KG/M2 | WEIGHT: 197 LBS | OXYGEN SATURATION: 95 %

## 2017-07-20 DIAGNOSIS — E03.9 HYPOTHYROIDISM, UNSPECIFIED TYPE: ICD-10-CM

## 2017-07-20 DIAGNOSIS — I48.0 PAROXYSMAL ATRIAL FIBRILLATION (HCC): ICD-10-CM

## 2017-07-20 PROBLEM — J40 BRONCHITIS: Status: RESOLVED | Noted: 2017-06-08 | Resolved: 2017-07-20

## 2017-07-20 PROBLEM — H66.003 BILATERAL ACUTE SUPPURATIVE OTITIS MEDIA: Status: RESOLVED | Noted: 2017-01-05 | Resolved: 2017-07-20

## 2017-07-20 PROCEDURE — 99214 OFFICE O/P EST MOD 30 MIN: CPT | Performed by: INTERNAL MEDICINE

## 2017-07-20 RX ORDER — LEVOTHYROXINE SODIUM 0.1 MG/1
100 TABLET ORAL
Qty: 90 TAB | Refills: 3 | Status: SHIPPED | OUTPATIENT
Start: 2017-07-20 | End: 2017-12-22

## 2017-07-20 NOTE — MR AVS SNAPSHOT
"        Nikki Aponte   2017 3:20 PM   Office Visit   MRN: [id number]    Department:  [facility]   Dept Phone:  [phone]    Description:  Female : 1970   Provider:  Ruddy Mansfield M.D.           Reason for Visit     Atrial Fibrillation discuss option     Results labs      Allergies as of 2017     Allergen Noted Reactions    Codeine 2010   Itching    Penicillins 2014       States that it does not treat anything when she has been on it.       You were diagnosed with     Hypothyroidism, unspecified type   [4662007]       Paroxysmal atrial fibrillation (CMS-[facility])   [413687]         Vital Signs     Blood Pressure Pulse Temperature Respirations Height Weight    100/62 mmHg 84 36.7 °C (98.1 °F) 16 1.676 m (5' 6\") 89.359 kg (197 lb)    Body Mass Index Oxygen Saturation Last Menstrual Period Breastfeeding? Smoking Status       31.81 kg/m2 95% 2017 No Former Smoker       Basic Information     Date Of Birth Sex Race Ethnicity Preferred Language    1970 Female  Non- English      Your appointments     2017  2:00 PM   FOLLOW UP with SAVANAH Meehan   [facility] (--)    [address] [place] [address] [address]  [place] [id number] [phone]            Sep 05, 2017  3:20 PM   Established Patient with Ruddy Mansfield M.D.   [facility] [place] (--)    [address]  [address]  [place] [address] [phone]           You will be receiving a confirmation call a few days before your appointment from our automated call confirmation system.              Problem List              ICD-10-CM Priority Class Noted - Resolved    Hypothyroidism E03.9 Low  2013 - Present    Depression F32.9 Low  2013 - Present    History of Hodgkin's disease Z85.71 Low  2013 - Present    Nonrheumatic aortic valve insufficiency I35.1 Medium  2014 - Present    Allergic rhinitis due to allergen J30.9 Low  2014 - Present    S/P radiation " therapy - 1990 Mantle Radiation (Chronic) Z92.3 Low  Unknown - Present    Paroxysmal atrial fibrillation (CMS-HCC) I48.0 Medium  12/22/2016 - Present    Carpal tunnel syndrome on left G56.02 Low  3/31/2017 - Present    Carpal tunnel syndrome on right G56.01 Low  4/21/2017 - Present    Obesity (BMI 30-39.9) E66.9 Medium  5/4/2017 - Present    TONY (obstructive sleep apnea) (Chronic) G47.33 Medium  Unknown - Present    History of breast cancer Z85.3 Low  5/17/2017 - Present    Diastolic dysfunction I51.9 Medium  7/18/2017 - Present      Health Maintenance        Date Due Completion Dates    IMM INFLUENZA (1) 9/1/2017 12/22/2016    PAP SMEAR 3/16/2018 3/16/2015 (Postponed)    Override on 3/16/2015: Postponed    MAMMOGRAM 6/19/2018 6/19/2017, 4/14/2016, 3/31/2016, 1/10/2014    IMM DTaP/Tdap/Td Vaccine (2 - Td) 12/22/2026 12/22/2016            Current Immunizations     Influenza Vaccine Quad Inj (Pf) 12/22/2016    Pneumococcal Vaccine (PCV7) Historical Data 5/31/2002    Tdap Vaccine 12/22/2016      Below and/or attached are the medications your provider expects you to take. Review all of your home medications and newly ordered medications with your provider and/or pharmacist. Follow medication instructions as directed by your provider and/or pharmacist. Please keep your medication list with you and share with your provider. Update the information when medications are discontinued, doses are changed, or new medications (including over-the-counter products) are added; and carry medication information at all times in the event of emergency situations     Allergies:  CODEINE - Itching     PENICILLINS - (reactions not documented)               Medications  Valid as of: July 20, 2017 -  3:47 PM    Generic Name Brand Name Tablet Size Instructions for use    Albuterol   Inhale  by mouth 2 Times a Day.        Apixaban (Tab) ELIQUIS 5mg Take 1 Tab by mouth 2 Times a Day.        Copper (IUD) PARAGARD INTRAUTERINE COPPER  by  Intrauterine route.        Dronedarone HCl (Tab) MULTAQ 400 MG Take 1 Tab by mouth 2 times a day, with meals.        Hydrocod Polst-Chlorphen Polst (Suspension Extended Release) TUSSIONEX 10-8 MG/5ML Take 5 mL by mouth at bedtime as needed for Cough or Rhinitis.        Levothyroxine Sodium (Tab) SYNTHROID 100 MCG Take 1 Tab by mouth Every morning on an empty stomach.        Sertraline HCl (Tab) ZOLOFT 100 MG Take 1 Tab by mouth every morning.        .                 Medicines prescribed today were sent to:     [facility]ACY #[id number] - [place] - [address] [place] [address]    Phone: [phone] Fax: [phone]    Open 24 Hours?: No      Medication refill instructions:       If your prescription bottle indicates you have medication refills left, it is not necessary to call your provider’s office. Please contact your pharmacy and they will refill your medication.    If your prescription bottle indicates you do not have any refills left, you may request refills at any time through one of the following ways: The online [employer] system (except Urgent Care), by calling your provider’s office, or by asking your pharmacy to contact your provider’s office with a refill request. Medication refills are processed only during regular business hours and may not be available until the next business day. Your provider may request additional information or to have a follow-up visit with you prior to refilling your medication.   *Please Note: Medication refills are assigned a new Rx number when refilled electronically. Your pharmacy may indicate that no refills were authorized even though a new prescription for the same medication is available at the pharmacy. Please request the medicine by name with the pharmacy before contacting your provider for a refill.           [employer] Access Code: Activation code not generated  Current [employer] Status: Active

## 2017-07-21 ENCOUNTER — OFFICE VISIT (OUTPATIENT)
Dept: CARDIOLOGY | Facility: MEDICAL CENTER | Age: 47
End: 2017-07-21
Payer: COMMERCIAL

## 2017-07-21 VITALS
BODY MASS INDEX: 31.5 KG/M2 | OXYGEN SATURATION: 94 % | SYSTOLIC BLOOD PRESSURE: 132 MMHG | HEART RATE: 56 BPM | WEIGHT: 196 LBS | DIASTOLIC BLOOD PRESSURE: 78 MMHG | HEIGHT: 66 IN

## 2017-07-21 DIAGNOSIS — I35.1 NONRHEUMATIC AORTIC VALVE INSUFFICIENCY: ICD-10-CM

## 2017-07-21 DIAGNOSIS — G47.33 OSA (OBSTRUCTIVE SLEEP APNEA): Chronic | ICD-10-CM

## 2017-07-21 DIAGNOSIS — I48.0 PAROXYSMAL ATRIAL FIBRILLATION (HCC): ICD-10-CM

## 2017-07-21 PROCEDURE — 93000 ELECTROCARDIOGRAM COMPLETE: CPT | Performed by: NURSE PRACTITIONER

## 2017-07-21 PROCEDURE — 99214 OFFICE O/P EST MOD 30 MIN: CPT | Performed by: NURSE PRACTITIONER

## 2017-07-21 ASSESSMENT — ENCOUNTER SYMPTOMS
SPUTUM PRODUCTION: 0
SHORTNESS OF BREATH: 0
WHEEZING: 0
CLAUDICATION: 0
COUGH: 0
DIZZINESS: 0
HEMOPTYSIS: 0
ORTHOPNEA: 0
HEADACHES: 0
PALPITATIONS: 1
FOCAL WEAKNESS: 0
FALLS: 0
LOSS OF CONSCIOUSNESS: 0
WEAKNESS: 0

## 2017-07-21 NOTE — TELEPHONE ENCOUNTER
----- Message from SAVANAH Jimenez sent at 7/19/2017  8:21 AM PDT -----  Thyroid function is a little off and that may be explaining why she is cold all the time. BNP is elevated- that's why she is more short of breath- start low dose lasix 20 mg for one week to improve symptoms-have her eat a banana every day to increase her K or baked potatoes. Glucose was a little elevated with non-fasting lab. CBC good. Mag level good. Overall not too bad of labs, but I had a feeling her thyroid was off. Hopefully that will fix the cold feeling and improve our afib control. SC

## 2017-07-21 NOTE — PROGRESS NOTES
Subjective:   Nikki Aponte is a 47 y.o. female here today for a -fib, lab work, hypothyroidism    Paroxysmal atrial fibrillation (CMS-HCC)  Persistent a-fib. Following with cardiology. She has been started on dronedarone. She feels significant better since then, she reports leg swelling had improved. She denies palpitation, leg swelling . Energy had improved. She has been shocked once. She is on sinus today per ausculation. She denies lightheaded   Echo 02/2017: Left ventricular ejection fraction is visually estimated to be 60%.   Normal regional wall motion. Grade I diastolic dysfunction.  Mild mitral regurgitation.  Moderate aortic insufficiency.  Estimated right ventricular systolic pressure  is 30 mmHg.  Echo images from 9/16/14 showed mild aortic insufficiency.    Question per cardiology if eliquashley is ok since she has moderate aortic insuffiencey. I will try to check with pharmacy too. Does she need to be on coumadin??     Hypothyroidism  TSh slight elevated. I advised to take full dose on Monday instead of half, as she used to take.        Current medicines (including changes today)  Current Outpatient Prescriptions   Medication Sig Dispense Refill   • levothyroxine (SYNTHROID) 100 MCG Tab Take 1 Tab by mouth Every morning on an empty stomach. 90 Tab 3   • dronedarone (MULTAQ) 400 MG Tab Take 1 Tab by mouth 2 times a day, with meals. 60 Tab 11   • Hydrocod Polst-CPM Polst ER (TUSSIONEX) 10-8 MG/5ML Suspension Extended Release Take 5 mL by mouth at bedtime as needed for Cough or Rhinitis. 140 mL 0   • ALBUTEROL INH Inhale  by mouth 2 Times a Day.     • apixaban (ELIQUIS) 5mg Tab Take 1 Tab by mouth 2 Times a Day. 60 Tab 3   • PARAGARD INTRAUTERINE COPPER IUD by Intrauterine route.     • sertraline (ZOLOFT) 100 MG Tab Take 1 Tab by mouth every morning. 90 Tab 3     No current facility-administered medications for this visit.     She  has a past medical history of Psychiatric problem; Heart murmur; Backpain;  Endometriosis; Hodgkin disease (CMS-HCC); Migraine variant (4/4/2013); Depression (4/4/2013); Allergic rhinitis (4/4/2013); Aortic sclerosis (CMS-HCC) (5/8/2014); Bronchitis (2012); Hypothyroidism (4/4/2013); Arrhythmia; Breath shortness; Cancer (CMS-HCC) (1990); Breast cancer, right (CMS-HCC) (4/2016); Paroxysmal atrial fibrillation (CMS-HCC); Sleep apnea; and Valvular heart disease.    Current Outpatient Prescriptions   Medication Sig Dispense Refill   • levothyroxine (SYNTHROID) 100 MCG Tab Take 1 Tab by mouth Every morning on an empty stomach. 90 Tab 3   • dronedarone (MULTAQ) 400 MG Tab Take 1 Tab by mouth 2 times a day, with meals. 60 Tab 11   • Hydrocod Polst-CPM Polst ER (TUSSIONEX) 10-8 MG/5ML Suspension Extended Release Take 5 mL by mouth at bedtime as needed for Cough or Rhinitis. 140 mL 0   • ALBUTEROL INH Inhale  by mouth 2 Times a Day.     • apixaban (ELIQUIS) 5mg Tab Take 1 Tab by mouth 2 Times a Day. 60 Tab 3   • PARAGARD INTRAUTERINE COPPER IUD by Intrauterine route.     • sertraline (ZOLOFT) 100 MG Tab Take 1 Tab by mouth every morning. 90 Tab 3     No current facility-administered medications for this visit.       Allergies as of 07/20/2017 - Darrin as Reviewed 07/20/2017   Allergen Reaction Noted   • Codeine Itching 03/29/2010   • Penicillins  12/01/2014       Social History     Social History   • Marital Status: Single     Spouse Name: N/A   • Number of Children: N/A   • Years of Education: N/A     Occupational History   • Not on file.     Social History Main Topics   • Smoking status: Former Smoker     Quit date: 03/29/1991   • Smokeless tobacco: Never Used   • Alcohol Use: 0.0 oz/week     0 Standard drinks or equivalent per week      Comment: 1 per month   • Drug Use: No   • Sexual Activity: Yes     Birth Control/ Protection: IUD     Other Topics Concern   • Not on file     Social History Narrative        Family History   Problem Relation Age of Onset   • Stroke Mother    • Hypertension Mother   "  • Stroke Father    • Hypertension Father    • Cancer Father      prostate cancer   • Diabetes Neg Hx        Past Surgical History   Procedure Laterality Date   • Us-needle core bx-breast panel     • Partial mastectomy Right 5/10/2016     Procedure: PARTIAL MASTECTOMY;  Surgeon: Patricia Romero M.D.;  Location: SURGERY SAME DAY [facility];  Service:    • Breast biopsy Right 5/25/2016     Procedure: BREAST BIOPSY FOR: RE-EXCISION OF RT BREAST TISSUE ;  Surgeon: Patricia Romero M.D.;  Location: SURGERY SAME DAY [facility];  Service:    • Carpal tunnel release Left 3/31/2017     Procedure: CARPAL TUNNEL RELEASE;  Surgeon: Shay Powers M.D.;  Location: SURGERY [facility];  Service:    • Carpal tunnel release Right 4/21/2017     Procedure: CARPAL TUNNEL RELEASE;  Surgeon: Shay Powers M.D.;  Location: SURGERY [facility];  Service:        ROS   All systems reviewed are negative except for HPI         Objective:     Blood pressure 100/62, pulse 84, temperature 36.7 °C (98.1 °F), resp. rate 16, height 1.676 m (5' 6\"), weight 89.359 kg (197 lb), last menstrual period 07/11/2017, SpO2 95 %, not currently breastfeeding. Body mass index is 31.81 kg/(m^2).   Physical Exam:  Constitutional: Alert, no distress.  Skin: Warm, dry, good turgor, no rashes in visible areas.  Eye: Equal, round and reactive, conjunctiva clear, lids normal.  ENMT: Lips without lesions, good dentition, oropharynx clear.  Neck: Trachea midline, no masses, no thyromegaly. No cervical or supraclavicular lymphadenopathy  Respiratory: Unlabored respiratory effort, lungs clear to auscultation, no wheezes, no ronchi.  Cardiovascular: Normal S1, S2, + murmur, no edema.  Abdomen: Soft, non-tender, no masses, no hepatosplenomegaly.  Psych: Alert and oriented x3, normal affect and mood.        Assessment and Plan:   The following treatment plan was discussed    1. Hypothyroidism, unspecified type  Increase dose Monday.   - levothyroxine " (SYNTHROID) 100 MCG Tab; Take 1 Tab by mouth Every morning on an empty stomach.  Dispense: 90 Tab; Refill: 3    2. Paroxysmal atrial fibrillation (CMS-HCC)  Continue same treatment, follow up with cards       Followup: Return in about 8 weeks (around 9/14/2017) for Short.

## 2017-07-21 NOTE — MR AVS SNAPSHOT
"        Nikki Aponte   2017 2:00 PM   Office Visit   MRN: [id number]    Department:  [facility]   Dept Phone:  [phone]    Description:  Female : 1970   Provider:  SAVANAH Meehan           Reason for Visit     Follow-Up           Allergies as of 2017     Allergen Noted Reactions    Penicillins 2014       States that it does not treat anything when she has been on it.       You were diagnosed with     Paroxysmal atrial fibrillation (CMS-HCC)   [691348]       Nonrheumatic aortic valve insufficiency   [818222]       TONY (obstructive sleep apnea)   [639612]         Vital Signs     Blood Pressure Pulse Height Weight Body Mass Index Oxygen Saturation    132/78 mmHg 56 1.676 m (5' 6\") 88.905 kg (196 lb) 31.65 kg/m2 94%    Last Menstrual Period Smoking Status                2017 Former Smoker          Basic Information     Date Of Birth Sex Race Ethnicity Preferred Language    1970 Female  Non- English      Your appointments     Sep 05, 2017  3:20 PM   Established Patient with Ruddy Mansfield M.D.   [facility] [place] (--)    [address]  [address]  [place] [id number] [phone]           You will be receiving a confirmation call a few days before your appointment from our automated call confirmation system.            Sep 07, 2017  2:00 PM   PREVIOUS PATIENT with Urbano Ye M.D.   [facility] (--)    [address] , [facility] [address]  [place] [address] [phone]              Problem List              ICD-10-CM Priority Class Noted - Resolved    Hypothyroidism E03.9 Low  2013 - Present    Depression F32.9 Low  2013 - Present    History of Hodgkin's disease Z85.71 Low  2013 - Present    Nonrheumatic aortic valve insufficiency I35.1 Medium  2014 - Present    Allergic rhinitis due to allergen J30.9 Low  2014 - Present    S/P radiation therapy -  [facility] (Chronic) Z92.3 Low  " Unknown - Present    Paroxysmal atrial fibrillation (CMS-HCC) I48.0 Medium  12/22/2016 - Present    Carpal tunnel syndrome on left G56.02 Low  3/31/2017 - Present    Carpal tunnel syndrome on right G56.01 Low  4/21/2017 - Present    Obesity (BMI 30-39.9) E66.9 Medium  5/4/2017 - Present    TONY (obstructive sleep apnea) (Chronic) G47.33 Medium  Unknown - Present    History of breast cancer Z85.3 Low  5/17/2017 - Present    Diastolic dysfunction I51.9 Medium  7/18/2017 - Present      Health Maintenance        Date Due Completion Dates    IMM INFLUENZA (1) 9/1/2017 12/22/2016    PAP SMEAR 3/16/2018 3/16/2015 (Postponed)    Override on 3/16/2015: Postponed    MAMMOGRAM 6/19/2018 6/19/2017, 4/14/2016, 3/31/2016, 1/10/2014    IMM DTaP/Tdap/Td Vaccine (2 - Td) 12/22/2026 12/22/2016            Results       Current Immunizations     Influenza Vaccine Quad Inj (Pf) 12/22/2016    Pneumococcal Vaccine (PCV7) Historical Data 5/31/2002    Tdap Vaccine 12/22/2016      Below and/or attached are the medications your provider expects you to take. Review all of your home medications and newly ordered medications with your provider and/or pharmacist. Follow medication instructions as directed by your provider and/or pharmacist. Please keep your medication list with you and share with your provider. Update the information when medications are discontinued, doses are changed, or new medications (including over-the-counter products) are added; and carry medication information at all times in the event of emergency situations     Allergies:  PENICILLINS - (reactions not documented)               Medications  Valid as of: July 21, 2017 -  2:34 PM    Generic Name Brand Name Tablet Size Instructions for use    Albuterol   Inhale  by mouth 2 Times a Day.        Apixaban (Tab) ELIQUIS 5mg Take 1 Tab by mouth 2 Times a Day.        Copper (IUD) PARAGARD INTRAUTERINE COPPER  by Intrauterine route.        Dronedarone HCl (Tab) MULTAQ 400 MG Take 1  Tab by mouth 2 times a day, with meals.        Hydrocod Polst-Chlorphen Polst (Suspension Extended Release) TUSSIONEX 10-8 MG/5ML Take 5 mL by mouth at bedtime as needed for Cough or Rhinitis.        Levothyroxine Sodium (Tab) SYNTHROID 100 MCG Take 1 Tab by mouth Every morning on an empty stomach.        Sertraline HCl (Tab) ZOLOFT 100 MG Take 1 Tab by mouth every morning.        .                 Medicines prescribed today were sent to:     [facility] #[id number] - [place] - [address] [place] [address]    Phone: [phone] Fax: [phone]    Open 24 Hours?: No      Medication refill instructions:       If your prescription bottle indicates you have medication refills left, it is not necessary to call your provider’s office. Please contact your pharmacy and they will refill your medication.    If your prescription bottle indicates you do not have any refills left, you may request refills at any time through one of the following ways: The online Mitro system (except Urgent Care), by calling your provider’s office, or by asking your pharmacy to contact your provider’s office with a refill request. Medication refills are processed only during regular business hours and may not be available until the next business day. Your provider may request additional information or to have a follow-up visit with you prior to refilling your medication.   *Please Note: Medication refills are assigned a new Rx number when refilled electronically. Your pharmacy may indicate that no refills were authorized even though a new prescription for the same medication is available at the pharmacy. Please request the medicine by name with the pharmacy before contacting your provider for a refill.        Referral     A referral request has been sent to our patient care coordination department. Please allow 3-5 business days for us to process this request and contact you either by phone or mail. If you do not hear from us by the  5th business day, please call us at [phone].           Blue[employer]ine Access Code: Activation code not generated  Current BlueVine Status: Active

## 2017-07-21 NOTE — TELEPHONE ENCOUNTER
Pt did not answer when calling her. Since she has an appt with Sita tomorrow, will send this info to Sita to follow-up and discuss at her appt.    To RT: ANJALI

## 2017-07-21 NOTE — PROGRESS NOTES
"Subjective:   Nikki Aponte is a 47 y.o. female who presents today for evaluation of atrial fibrillation.    She is patient of  in our clinic, HX: PAF, hypothyroid, obesity, TONY (un-treated), moderate AI, and breast CA with radiation therapy.    Patient was last seen about three days with Pippa JIN.  She was feeling poorly and fatigue. She was in atrial fibrillation and was started on multaq 400mg BID. Labs.     TSH was elevated, PCP changed her medication dose. Otherwise labs were not impressive.     Patient is doing very well, since starting multaq 400mg BID. Her palpitation has improved. She has noticed improvement in her fatigue and energy level.     She has had no episodes of chest pain, dizziness/lightheadedness, shortness of breath, orthopnea, or peripheral edema.      Past Medical History   Diagnosis Date   • Psychiatric problem    • Heart murmur    • Backpain    • Endometriosis    • Hodgkin disease (CMS-HCC)    • Migraine variant 4/4/2013   • Depression 4/4/2013   • Allergic rhinitis 4/4/2013   • Aortic sclerosis (CMS-HCC) 5/8/2014   • Bronchitis 2012   • Hypothyroidism 4/4/2013   • Arrhythmia      \"Skips a beat\"   • Breath shortness      \"I have scar tissue in right lung from previous radiation, use my inhaler about once a week\"   • Cancer (CMS-HCC) 1990     hodgkins, previous chemo, radiation , breast cancer right side, lumpectomy   • Breast cancer, right (CMS-HCC) 4/2016   • Paroxysmal atrial fibrillation (CMS-HCC)    • Sleep apnea    • Valvular heart disease      moderate AI      Past Surgical History   Procedure Laterality Date   • Us-needle core bx-breast panel     • Partial mastectomy Right 5/10/2016     Procedure: PARTIAL MASTECTOMY;  Surgeon: Patricia Romero M.D.;  Location: SURGERY SAME DAY [facility];  Service:    • Breast biopsy Right 5/25/2016     Procedure: BREAST BIOPSY FOR: RE-EXCISION OF RT BREAST TISSUE ;  Surgeon: Patricia Romero M.D.;  Location: SURGERY SAME DAY " [facility];  Service:    • Carpal tunnel release Left 3/31/2017     Procedure: CARPAL TUNNEL RELEASE;  Surgeon: Shay Powers M.D.;  Location: SURGERY [facility];  Service:    • Carpal tunnel release Right 4/21/2017     Procedure: CARPAL TUNNEL RELEASE;  Surgeon: Shay Powers M.D.;  Location: SURGERY [facility];  Service:      Family History   Problem Relation Age of Onset   • Stroke Mother    • Hypertension Mother    • Stroke Father    • Hypertension Father    • Cancer Father      prostate cancer   • Diabetes Neg Hx      History   Smoking status   • Former Smoker   • Quit date: 03/29/1991   Smokeless tobacco   • Never Used     Allergies   Allergen Reactions   • Penicillins      States that it does not treat anything when she has been on it.      Outpatient Encounter Prescriptions as of 7/21/2017   Medication Sig Dispense Refill   • levothyroxine (SYNTHROID) 100 MCG Tab Take 1 Tab by mouth Every morning on an empty stomach. 90 Tab 3   • dronedarone (MULTAQ) 400 MG Tab Take 1 Tab by mouth 2 times a day, with meals. 60 Tab 11   • Hydrocod Polst-CPM Polst ER (TUSSIONEX) 10-8 MG/5ML Suspension Extended Release Take 5 mL by mouth at bedtime as needed for Cough or Rhinitis. 140 mL 0   • ALBUTEROL INH Inhale  by mouth 2 Times a Day.     • apixaban (ELIQUIS) 5mg Tab Take 1 Tab by mouth 2 Times a Day. 60 Tab 3   • PARAGARD INTRAUTERINE COPPER IUD by Intrauterine route.     • sertraline (ZOLOFT) 100 MG Tab Take 1 Tab by mouth every morning. 90 Tab 3     No facility-administered encounter medications on file as of 7/21/2017.     Review of Systems   Constitutional: Negative for malaise/fatigue.   Respiratory: Negative for cough, hemoptysis, sputum production, shortness of breath and wheezing.    Cardiovascular: Positive for palpitations (rare since being on multaq). Negative for chest pain, orthopnea, claudication and leg swelling.   Musculoskeletal: Negative for falls.   Neurological: Negative for  "dizziness, focal weakness, loss of consciousness, weakness and headaches.   All other systems reviewed and are negative.       Objective:   /78 mmHg  Pulse 56  Ht 1.676 m (5' 6\")  Wt 88.905 kg (196 lb)  BMI 31.65 kg/m2  SpO2 94%  LMP 07/11/2017    Physical Exam   Constitutional: She is oriented to person, place, and time. She appears well-developed and well-nourished. No distress.   Fatigue    HENT:   Head: Normocephalic.   Neck: Normal range of motion. No JVD present.   Cardiovascular: Regular rhythm and normal heart sounds.  Bradycardia present.    No murmur heard.  Pulmonary/Chest: Effort normal and breath sounds normal. No respiratory distress. She has no wheezes.   Abdominal: Bowel sounds are normal.   Musculoskeletal: She exhibits no edema or tenderness.   Neurological: She is alert and oriented to person, place, and time.   Skin: Skin is warm and dry.   Psychiatric: Her behavior is normal. Judgment normal.   Nursing note and vitals reviewed.    Results for EMILEE DAMICO (MRN [id number]) as of 7/21/2017 14:10   Ref. Range 7/18/2017 15:34   WBC Latest Ref Range: 4.8-10.8 K/uL 9.3   RBC Latest Ref Range: 4.20-5.40 M/uL 4.69   Hemoglobin Latest Ref Range: 12.0-16.0 g/dL 14.0   Hematocrit Latest Ref Range: 37.0-47.0 % 42.4   MCV Latest Ref Range: 81.4-97.8 fL 90.4   MCH Latest Ref Range: 27.0-33.0 pg 29.9   MCHC Latest Ref Range: 33.6-35.0 g/dL 33.0 (L)   RDW Latest Ref Range: 35.9-50.0 fL 43.9   Platelet Count Latest Ref Range: 164-446 K/uL 268   MPV Latest Ref Range: 9.0-12.9 fL 11.4   Sodium Latest Ref Range: 135-145 mmol/L 137   Potassium Latest Ref Range: 3.6-5.5 mmol/L 3.8   Chloride Latest Ref Range:  mmol/L 104   Co2 Latest Ref Range: 20-33 mmol/L 23   Anion Gap Latest Ref Range: 0.0-11.9  10.0   Glucose Latest Ref Range: 65-99 mg/dL 105 (H)   Bun Latest Ref Range: 8-22 mg/dL 10   Creatinine Latest Ref Range: 0.50-1.40 mg/dL 0.72   GFR If  Latest Ref Range: >60 mL/min/1.73 " m 2 >60   GFR If Non  Latest Ref Range: >60 mL/min/1.73 m 2 >60   Calcium Latest Ref Range: 8.5-10.5 mg/dL 9.5   AST(SGOT) Latest Ref Range: 12-45 U/L 18   ALT(SGPT) Latest Ref Range: 2-50 U/L 30   Alkaline Phosphatase Latest Ref Range: 30-99 U/L 83   Total Bilirubin Latest Ref Range: 0.1-1.5 mg/dL 0.4   Albumin Latest Ref Range: 3.2-4.9 g/dL 4.1   Total Protein Latest Ref Range: 6.0-8.2 g/dL 6.9   Globulin Latest Ref Range: 1.9-3.5 g/dL 2.8   A-G Ratio Latest Units: g/dL 1.5   Magnesium Latest Ref Range: 1.5-2.5 mg/dL 1.8   B Natriuretic Peptide Latest Ref Range: 0-100 pg/mL 215 (H)   TSH Latest Ref Range: 0.300-3.700 uIU/mL 3.910 (H)   Free T-4 Latest Ref Range: 0.53-1.43 ng/dL 0.84            Echocardiography Laboratory  2/21/2017  CONCLUSIONS  Left ventricular ejection fraction is visually estimated to be 60%.   Normal regional wall motion. Grade I diastolic dysfunction.  Mild mitral regurgitation.  Moderate aortic insufficiency.  Estimated right ventricular systolic pressure  is 30 mmHg.  Echo images from 9/16/14 showed mild aortic insufficiency.    Assessment:     1. Paroxysmal atrial fibrillation (CMS-HCC)  [facility] EKG (Clinic Performed)    REFERRAL TO PULMONOLOGY   2. Nonrheumatic aortic valve insufficiency     3. TONY (obstructive sleep apnea)  REFERRAL TO PULMONOLOGY       Medical Decision Making:  Today's Assessment / Status / Plan:     1. Atrial fibrillation:   - EKG today reveals sinus rhythm today.  - continue multaq 400mg BID   - continue oral anticoagulation: eliquis 5mg BID     2. Aortic valve insufficiency:  - moderate aortic insufficiency, follow up yearly ECHO    3. Obstructive sleep apnea:  - has been diagnosed but has not been wearing her mouthguard, Follow up with pulmonology. Referral placed.     Follow up with Dr. Urbano Ye in 1-2 months for further evaluation and treatment of PAF.     Collaborating Provider: Dr. Noyola

## 2017-07-21 NOTE — ASSESSMENT & PLAN NOTE
Persistent a-fib. Following with cardiology. She has been started on dronedarone. She feels significant better since then, she reports leg swelling had improved. She denies palpitation, leg swelling . Energy had improved. She has been shocked once. She is on sinus today per ausculation. She denies lightheaded   Echo 02/2017: Left ventricular ejection fraction is visually estimated to be 60%.   Normal regional wall motion. Grade I diastolic dysfunction.  Mild mitral regurgitation.  Moderate aortic insufficiency.  Estimated right ventricular systolic pressure  is 30 mmHg.  Echo images from 9/16/14 showed mild aortic insufficiency.    Question per cardiology if eliquis is ok since she has moderate aortic insuffiencey. I will try to check with pharmacy too. Does she need to be on coumadin??

## 2017-07-22 LAB — EKG IMPRESSION: NORMAL

## 2017-08-01 VITALS
DIASTOLIC BLOOD PRESSURE: 74 MMHG | HEART RATE: 97 BPM | RESPIRATION RATE: 14 BRPM | WEIGHT: 193 LBS | HEIGHT: 67 IN | BODY MASS INDEX: 30.29 KG/M2 | TEMPERATURE: 97.7 F | SYSTOLIC BLOOD PRESSURE: 122 MMHG

## 2017-08-01 RX ORDER — ALBUTEROL SULFATE 90 UG/1
2 AEROSOL, METERED RESPIRATORY (INHALATION) EVERY 4 HOURS PRN
COMMUNITY

## 2017-08-04 ENCOUNTER — SLEEP CENTER VISIT (OUTPATIENT)
Dept: SLEEP MEDICINE | Facility: MEDICAL CENTER | Age: 47
End: 2017-08-04
Payer: COMMERCIAL

## 2017-08-04 VITALS
OXYGEN SATURATION: 97 % | HEART RATE: 87 BPM | BODY MASS INDEX: 31.98 KG/M2 | RESPIRATION RATE: 16 BRPM | WEIGHT: 199 LBS | DIASTOLIC BLOOD PRESSURE: 72 MMHG | SYSTOLIC BLOOD PRESSURE: 118 MMHG | HEIGHT: 66 IN

## 2017-08-04 DIAGNOSIS — I48.0 PAROXYSMAL ATRIAL FIBRILLATION (HCC): ICD-10-CM

## 2017-08-04 DIAGNOSIS — G47.33 OSA (OBSTRUCTIVE SLEEP APNEA): Chronic | ICD-10-CM

## 2017-08-04 DIAGNOSIS — Z79.01 CHRONIC ANTICOAGULATION: ICD-10-CM

## 2017-08-04 DIAGNOSIS — Z85.3 HISTORY OF BREAST CANCER: ICD-10-CM

## 2017-08-04 DIAGNOSIS — E66.9 OBESITY (BMI 30-39.9): ICD-10-CM

## 2017-08-04 DIAGNOSIS — Z85.71 HISTORY OF HODGKIN'S DISEASE: ICD-10-CM

## 2017-08-04 PROCEDURE — 99213 OFFICE O/P EST LOW 20 MIN: CPT | Performed by: NURSE PRACTITIONER

## 2017-08-04 RX ORDER — ZOLPIDEM TARTRATE 5 MG/1
5 TABLET ORAL NIGHTLY PRN
Qty: 3 TAB | Refills: 0 | Status: SHIPPED | OUTPATIENT
Start: 2017-08-04 | End: 2017-09-05

## 2017-08-04 NOTE — PROGRESS NOTES
"Chief Complaint   Patient presents with   • Follow-Up       HPI:  Nikki Aponte is a 47 y.o. year old female here today for follow-up on TONY and asthma. Last office visit was 324-6 units of bakery when necessary. Complete study indicated AHI 20.4 with a minimum oxygen saturation 75%. Current weight is 199 pounds. Patient has gained 7 pounds since office visit last year. Last year she was diagnosed with breast cancer and underwent radiation treatment and follow-up mammograms have been clear. She was also diagnosed with paroxysmal atrial fibrillation in the last 2 months and started Eliquis. She is encouraged to follow up with us due to her history of sleep apnea. She was never treated because she was determined to lose weight in order to help it resolve. Today she presents with symptoms of daytime fatigue, not sleeping well and having to nap daily. She has a hard time initiating sleep at night. She denies morning headaches. She would like to proceed with reevaluation of her sleep apnea and lab tests.    She is also followed for history of asthma. PFTs March 16 indicated FEV1 1.92 L or 64% predicted, FEV1/FVC 72% predicted, TLC 91% predicted and DLCO 121% predicted. She was given Ventolin HFA inhaler to use as needed. She denies any respiratory issues currently. She denies dyspnea, cough, phlegm or wheezing. She denies acid reflux or sinus issues.          ROS: As per HPI and otherwise negative if not stated.    Past Medical History   Diagnosis Date   • Psychiatric problem    • Heart murmur    • Backpain    • Endometriosis    • Hodgkin disease (CMS-HCC)    • Migraine variant 4/4/2013   • Depression 4/4/2013   • Allergic rhinitis 4/4/2013   • Aortic sclerosis (CMS-HCC) 5/8/2014   • Bronchitis 2012   • Hypothyroidism 4/4/2013   • Arrhythmia      \"Skips a beat\"   • Breath shortness      \"I have scar tissue in right lung from previous radiation, use my inhaler about once a week\"   • Cancer (CMS-HCC) 1990     hodgkins, " previous chemo, radiation , breast cancer right side, lumpectomy   • Breast cancer, right (CMS-HCC) 4/2016   • Paroxysmal atrial fibrillation (CMS-HCC)    • Sleep apnea    • Valvular heart disease      moderate AI        Past Surgical History   Procedure Laterality Date   • Us-needle core bx-breast panel     • Partial mastectomy Right 5/10/2016     Procedure: PARTIAL MASTECTOMY;  Surgeon: Patricia Romero M.D.;  Location: SURGERY SAME DAY [facility];  Service:    • Breast biopsy Right 5/25/2016     Procedure: BREAST BIOPSY FOR: RE-EXCISION OF RT BREAST TISSUE ;  Surgeon: Patricia Romero M.D.;  Location: SURGERY SAME DAY [facility];  Service:    • Carpal tunnel release Left 3/31/2017     Procedure: CARPAL TUNNEL RELEASE;  Surgeon: Shay Powers M.D.;  Location: SURGERY [facility];  Service:    • Carpal tunnel release Right 4/21/2017     Procedure: CARPAL TUNNEL RELEASE;  Surgeon: Shay Powres M.D.;  Location: SURGERY [facility];  Service:        Family History   Problem Relation Age of Onset   • Stroke Mother    • Hypertension Mother    • Stroke Father    • Hypertension Father    • Cancer Father      prostate cancer   • Diabetes Neg Hx        Social History     Social History   • Marital Status: Single     Spouse Name: N/A   • Number of Children: N/A   • Years of Education: N/A     Occupational History   • Not on file.     Social History Main Topics   • Smoking status: Former Smoker -- 0.25 packs/day for 1 years     Quit date: 03/29/1991   • Smokeless tobacco: Never Used   • Alcohol Use: 0.0 oz/week     0 Standard drinks or equivalent per week      Comment: 1 per month   • Drug Use: No   • Sexual Activity: Yes     Birth Control/ Protection: IUD     Other Topics Concern   • Not on file     Social History Narrative       Allergies as of 08/04/2017 - Darrin as Reviewed 08/04/2017   Allergen Reaction Noted   • Codeine  08/01/2017   • Penicillins  12/01/2014        @Vital signs for this  "encounter:  Filed Vitals:    08/04/17 1451   Height: 1.676 m (5' 6\")   Weight: 90.266 kg (199 lb)   Weight % change since last entry.: 0 %   BP: 118/72   Pulse: 87   BMI (Calculated): 32.12   Resp: 16   O2 sat % room air: 97 %       Current medications as of today   Current Outpatient Prescriptions   Medication Sig Dispense Refill   • albuterol (VENTOLIN HFA) 108 (90 BASE) MCG/ACT Aero Soln inhalation aerosol Inhale 2 Puffs by mouth every four hours as needed for Shortness of Breath.     • levothyroxine (SYNTHROID) 100 MCG Tab Take 1 Tab by mouth Every morning on an empty stomach. 90 Tab 3   • dronedarone (MULTAQ) 400 MG Tab Take 1 Tab by mouth 2 times a day, with meals. 60 Tab 11   • ALBUTEROL INH Inhale  by mouth 2 Times a Day.     • apixaban (ELIQUIS) 5mg Tab Take 1 Tab by mouth 2 Times a Day. 60 Tab 3   • PARAGARD INTRAUTERINE COPPER IUD by Intrauterine route.     • sertraline (ZOLOFT) 100 MG Tab Take 1 Tab by mouth every morning. 90 Tab 3   • Hydrocod Polst-CPM Polst ER (TUSSIONEX) 10-8 MG/5ML Suspension Extended Release Take 5 mL by mouth at bedtime as needed for Cough or Rhinitis. 140 mL 0     No current facility-administered medications for this visit.         Physical Exam:   Gen:           Alert and oriented, No apparent distress. Mood and affect appropriate, normal interaction with examiner.  Eyes:          PERRL, EOM intact, sclere white, conjunctive moist.  Ears:          Not examined.  Hearing:     Grossly intact.  Nose:          Normal, no lesions or deformities.  Dentition:    Good dentition.  Oropharynx:   Tongue normal, posterior pharynx without erythema or exudate.  Mallampati Classification: 3  Neck:        Supple, trachea midline, no masses.  Respiratory Effort: No intercostal retractions or use of accessory muscles.   Lung Auscultation:      Clear to auscultation bilaterally; no rales, rhonchi or wheezing.  CV:            Regular rate and rhythm. No murmurs, rubs or gallops.  Abd:           Not " examined. Overweight.  Lymphadenopathy: Not examined.  Gait and Station: Normal.  Digits and Nails: No clubbing, cyanosis, petechiae, or nodes.   Cranial Nerves: II-XII grossly intact.  Skin:        No rashes, lesions or ulcers noted.               Ext:           No cyanosis or edema.      Assessment:  1. TONY (obstructive sleep apnea)     2. Obesity (BMI 30-39.9)     3. Paroxysmal atrial fibrillation (CMS-HCC)     4. Chronic anticoagulation     5. History of breast cancer     6. History of Hodgkin's disease         Immunizations:    Flu:2016  Pneumovax 23:not given  Prevnar 13:not given    Plan:  1. PSG split night now. RX for Ambien for sleep study only.  2. Discussed sleepiness for hygiene.  3. Encouraged weight loss.  4. Follow-up with cardiology for atrial fibrillation.  5. Follow-up in one month with sleep testing APRN, sooner if needed.

## 2017-08-04 NOTE — MR AVS SNAPSHOT
"        Nikki Aponte   2017 3:00 PM   Sleep Center Visit   MRN: [id number]    Department:  [facility]   Dept Phone:  [phone]    Description:  Female : 1970   Provider:  SAVANAH Barclay           Reason for Visit     Follow-Up           Allergies as of 2017     Allergen Noted Reactions    Codeine 2017       Penicillins 2014       States that it does not treat anything when she has been on it.       You were diagnosed with     TONY (obstructive sleep apnea)   [433656]       Obesity (BMI 30-39.9)   [804216]       Paroxysmal atrial fibrillation (CMS-HCC)   [929172]       Chronic anticoagulation   [172837]       History of breast cancer   [123024]       History of Hodgkin's disease   [577234]         Vital Signs     Blood Pressure Pulse Respirations Height Weight Body Mass Index    118/72 mmHg 87 16 1.676 m (5' 6\") 90.266 kg (199 lb) 32.13 kg/m2    Oxygen Saturation Last Menstrual Period Smoking Status             97% 2017 Former Smoker         Basic Information     Date Of Birth Sex Race Ethnicity Preferred Language    1970 Female  Non- English      Your appointments     Sep 05, 2017  3:20 PM   Established Patient with Ruddy Mansfield M.D.   [facility] [place] (--)    [address]  [address]  [place] [address] [phone]           You will be receiving a confirmation call a few days before your appointment from our automated call confirmation system.            Sep 07, 2017  2:00 PM   PREVIOUS PATIENT with Urbano Ye M.D.   [facility] (--)    [address], [facility] [address]  [place] [id number] [phone]            Oct 02, 2017  7:10 PM   Sleep Study Diagnostic with SLEEP TECH   [facility] (--)    [address]  [place] [id number] [phone]            Oct 05, 2017  4:00 PM   Follow UP with SAVANAH Coles   [facility] (--)    [address]  [place] [address]   [phone]              Problem List              ICD-10-CM Priority Class Noted - Resolved    Hypothyroidism E03.9 Low  4/4/2013 - Present    Depression F32.9 Low  4/4/2013 - Present    History of Hodgkin's disease Z85.71 Low  12/30/2013 - Present    Nonrheumatic aortic valve insufficiency I35.1 Medium  5/8/2014 - Present    Allergic rhinitis due to allergen J30.9 Low  5/8/2014 - Present    S/P radiation therapy - 1990 Mantle Radiation (Chronic) Z92.3 Low  Unknown - Present    Paroxysmal atrial fibrillation (CMS-HCC) I48.0 Medium  12/22/2016 - Present    Carpal tunnel syndrome on left G56.02 Low  3/31/2017 - Present    Carpal tunnel syndrome on right G56.01 Low  4/21/2017 - Present    Obesity (BMI 30-39.9) E66.9 Medium  5/4/2017 - Present    TONY (obstructive sleep apnea) (Chronic) G47.33 Medium  Unknown - Present    History of breast cancer Z85.3 Low  5/17/2017 - Present    Diastolic dysfunction I51.9 Medium  7/18/2017 - Present    Chronic anticoagulation Z79.01   8/4/2017 - Present      Health Maintenance        Date Due Completion Dates    IMM INFLUENZA (1) 9/1/2017 12/22/2016    PAP SMEAR 3/16/2018 3/16/2015 (Postponed)    Override on 3/16/2015: Postponed    MAMMOGRAM 6/19/2018 6/19/2017, 4/14/2016, 3/31/2016, 1/10/2014    IMM DTaP/Tdap/Td Vaccine (2 - Td) 12/22/2026 12/22/2016            Current Immunizations     Influenza Vaccine Quad Inj (Pf) 12/22/2016    Pneumococcal Vaccine (PCV7) Historical Data 5/31/2002    Tdap Vaccine 12/22/2016      Below and/or attached are the medications your provider expects you to take. Review all of your home medications and newly ordered medications with your provider and/or pharmacist. Follow medication instructions as directed by your provider and/or pharmacist. Please keep your medication list with you and share with your provider. Update the information when medications are discontinued, doses are  changed, or new medications (including over-the-counter products) are added; and carry medication information at all times in the event of emergency situations     Allergies:  CODEINE - (reactions not documented)     PENICILLINS - (reactions not documented)               Medications  Valid as of: August 04, 2017 -  3:28 PM    Generic Name Brand Name Tablet Size Instructions for use    Albuterol   Inhale  by mouth 2 Times a Day.        Albuterol Sulfate (Aero Soln) albuterol 108 (90 BASE) MCG/ACT Inhale 2 Puffs by mouth every four hours as needed for Shortness of Breath.        Apixaban (Tab) ELIQUIS 5mg Take 1 Tab by mouth 2 Times a Day.        Copper (IUD) PARAGARD INTRAUTERINE COPPER  by Intrauterine route.        Dronedarone HCl (Tab) MULTAQ 400 MG Take 1 Tab by mouth 2 times a day, with meals.        Hydrocod Polst-Chlorphen Polst (Suspension Extended Release) TUSSIONEX 10-8 MG/5ML Take 5 mL by mouth at bedtime as needed for Cough or Rhinitis.        Levothyroxine Sodium (Tab) SYNTHROID 100 MCG Take 1 Tab by mouth Every morning on an empty stomach.        Sertraline HCl (Tab) ZOLOFT 100 MG Take 1 Tab by mouth every morning.        Zolpidem Tartrate (Tab) AMBIEN 5 MG Take 1 Tab by mouth at bedtime as needed for Sleep.        .                 Medicines prescribed today were sent to:     [id number] - [place]IG[place] - [address] [place] [address]    Phone: [phone] Fax: [phone]    Open 24 Hours?: No      Medication refill instructions:       If your prescription bottle indicates you have medication refills left, it is not necessary to call your provider’s office. Please contact your pharmacy and they will refill your medication.    If your prescription bottle indicates you do not have any refills left, you may request refills at any time through one of the following ways: The online [employer] system (except Urgent Care), by calling your provider’s office, or by asking your pharmacy to contact  your provider’s office with a refill request. Medication refills are processed only during regular business hours and may not be available until the next business day. Your provider may request additional information or to have a follow-up visit with you prior to refilling your medication.   *Please Note: Medication refills are assigned a new Rx number when refilled electronically. Your pharmacy may indicate that no refills were authorized even though a new prescription for the same medication is available at the pharmacy. Please request the medicine by name with the pharmacy before contacting your provider for a refill.        Your To Do List     Future Labs/Procedures Complete By Expires    POLYSOMNOGRAPHY, 4 OR MORE  As directed 8/4/2018         [employer] Access Code: Activation code not generated  Current [employer] Status: Active

## 2017-09-05 ENCOUNTER — OFFICE VISIT (OUTPATIENT)
Dept: MEDICAL GROUP | Facility: PHYSICIAN GROUP | Age: 47
End: 2017-09-05
Payer: COMMERCIAL

## 2017-09-05 VITALS
TEMPERATURE: 98.4 F | SYSTOLIC BLOOD PRESSURE: 130 MMHG | RESPIRATION RATE: 16 BRPM | BODY MASS INDEX: 32.62 KG/M2 | OXYGEN SATURATION: 94 % | HEIGHT: 66 IN | DIASTOLIC BLOOD PRESSURE: 84 MMHG | WEIGHT: 203 LBS | HEART RATE: 87 BPM

## 2017-09-05 DIAGNOSIS — I35.1 NONRHEUMATIC AORTIC VALVE INSUFFICIENCY: ICD-10-CM

## 2017-09-05 DIAGNOSIS — I48.0 PAROXYSMAL ATRIAL FIBRILLATION (HCC): ICD-10-CM

## 2017-09-05 DIAGNOSIS — D22.9 ATYPICAL NEVUS: ICD-10-CM

## 2017-09-05 DIAGNOSIS — Z23 NEED FOR VACCINATION: ICD-10-CM

## 2017-09-05 DIAGNOSIS — E03.9 HYPOTHYROIDISM, UNSPECIFIED TYPE: ICD-10-CM

## 2017-09-05 DIAGNOSIS — G47.33 OSA (OBSTRUCTIVE SLEEP APNEA): Chronic | ICD-10-CM

## 2017-09-05 PROBLEM — G56.01 CARPAL TUNNEL SYNDROME ON RIGHT: Status: RESOLVED | Noted: 2017-04-21 | Resolved: 2017-09-05

## 2017-09-05 PROBLEM — G56.02 CARPAL TUNNEL SYNDROME ON LEFT: Status: RESOLVED | Noted: 2017-03-31 | Resolved: 2017-09-05

## 2017-09-05 PROCEDURE — 90471 IMMUNIZATION ADMIN: CPT | Performed by: INTERNAL MEDICINE

## 2017-09-05 PROCEDURE — 90686 IIV4 VACC NO PRSV 0.5 ML IM: CPT | Performed by: INTERNAL MEDICINE

## 2017-09-05 PROCEDURE — 99214 OFFICE O/P EST MOD 30 MIN: CPT | Mod: 25 | Performed by: INTERNAL MEDICINE

## 2017-09-05 NOTE — ASSESSMENT & PLAN NOTE
Moderate aortic insufficiency echocardiogram 02/2017. Not symptomatic. Defer to cards for further management.

## 2017-09-05 NOTE — ASSESSMENT & PLAN NOTE
Persistent a-fib. Regular today. Denies shortness of breath, palpitation, dyspnea, orthopnea. She is on eliquis. dronaderone for rhythm and rate controled

## 2017-09-05 NOTE — ASSESSMENT & PLAN NOTE
TSH slight elevated. She started to take levothyroxine 100 mcg daily. Feels better since then. She denies extreme fatigue, dry skin, hair loss, intolerance to cold or upper

## 2017-09-07 ENCOUNTER — OFFICE VISIT (OUTPATIENT)
Dept: CARDIOLOGY | Facility: MEDICAL CENTER | Age: 47
End: 2017-09-07
Payer: COMMERCIAL

## 2017-09-07 VITALS
HEART RATE: 86 BPM | BODY MASS INDEX: 32.62 KG/M2 | WEIGHT: 203 LBS | DIASTOLIC BLOOD PRESSURE: 70 MMHG | OXYGEN SATURATION: 94 % | HEIGHT: 66 IN | SYSTOLIC BLOOD PRESSURE: 128 MMHG

## 2017-09-07 DIAGNOSIS — I48.0 PAF (PAROXYSMAL ATRIAL FIBRILLATION) (HCC): ICD-10-CM

## 2017-09-07 LAB — EKG IMPRESSION: NORMAL

## 2017-09-07 PROCEDURE — 99204 OFFICE O/P NEW MOD 45 MIN: CPT | Performed by: INTERNAL MEDICINE

## 2017-09-07 PROCEDURE — 93000 ELECTROCARDIOGRAM COMPLETE: CPT | Performed by: INTERNAL MEDICINE

## 2017-09-07 NOTE — PROGRESS NOTES
"Arrhythmia Clinic Note (New patient)     DOS: 9/7/2017    Referring physician: Sebas Sharma    Chief complaint/Reason for consult: PAF    HPI:  Patient is a 48 yo  female who has a history of breast cancer s/p XRT, aortic sclerosis, hypothyroidism, who was recently seen in ED for PAF. She was then seen in office with Dr. Sharma who started her on Multaq. She says she felt pounding in her chest that felt like she was \"having a heart attack\" when it happened. Thinking back she says she thinks she had several episodes before presenting with the last one before that resolved on their own. She says since being on the Multaq she has not had further symptoms. She was also started on OAC which she is tolerating.    ROS (+ highlighted in red):  Constitutional: Fevers/chills/fatigue/weightloss  HEENT: Blurry vision/eye pain/sore throat/hearing loss  Respiratory: Shortness of breath/cough  Cardiovascular: Chest pain/palpitations/edema/orthopnea/syncope  GI: Nausea/vomitting/diarrhea  MSK: Arthralgias/myagias/muscle weakness  Skin: Rash/sores  Neurological: Numbness/tremors/vertigo  Endocrine: Excessive thirst/polyuria/cold intolerance/heat intolerance  Psych: Depression/anxiety    Past Medical History:   Diagnosis Date   • Breast cancer, right (CMS-HCC) 4/2016   • Aortic sclerosis (CMS-HCC) 5/8/2014   • Migraine variant 4/4/2013   • Depression 4/4/2013   • Allergic rhinitis 4/4/2013   • Hypothyroidism 4/4/2013   • Bronchitis 2012   • Cancer (CMS-HCC) 1990    hodgkins, previous chemo, radiation , breast cancer right side, lumpectomy   • Arrhythmia     \"Skips a beat\"   • Backpain    • Breath shortness     \"I have scar tissue in right lung from previous radiation, use my inhaler about once a week\"   • Endometriosis    • Heart murmur    • Hodgkin disease (CMS-HCC)    • Paroxysmal atrial fibrillation (CMS-HCC)    • Psychiatric problem    • Sleep apnea    • Valvular heart disease     moderate AI        Past Surgical " History:   Procedure Laterality Date   • CARPAL TUNNEL RELEASE Right 4/21/2017    Procedure: CARPAL TUNNEL RELEASE;  Surgeon: Shay Powers M.D.;  Location: SURGERY [facility];  Service:    • CARPAL TUNNEL RELEASE Left 3/31/2017    Procedure: CARPAL TUNNEL RELEASE;  Surgeon: Shay Powers M.D.;  Location: SURGERY [facility];  Service:    • BREAST BIOPSY Right 5/25/2016    Procedure: BREAST BIOPSY FOR: RE-EXCISION OF RT BREAST TISSUE ;  Surgeon: Patricia Romero M.D.;  Location: SURGERY SAME DAY [facility];  Service:    • PARTIAL MASTECTOMY Right 5/10/2016    Procedure: PARTIAL MASTECTOMY;  Surgeon: Patricia Romero M.D.;  Location: SURGERY SAME DAY [facility];  Service:    • US-NEEDLE CORE BX-BREAST PANEL         Social History     Social History   • Marital status: Single     Spouse name: N/A   • Number of children: N/A   • Years of education: N/A     Occupational History   • Not on file.     Social History Main Topics   • Smoking status: Former Smoker     Packs/day: 0.25     Years: 1.00     Quit date: 3/29/1991   • Smokeless tobacco: Never Used   • Alcohol use 0.0 oz/week      Comment: 1 per month   • Drug use: No   • Sexual activity: Yes     Birth control/ protection: IUD     Other Topics Concern   • Not on file     Social History Narrative   • No narrative on file       Family History   Problem Relation Age of Onset   • Stroke Mother    • Hypertension Mother    • Stroke Father    • Hypertension Father    • Cancer Father      prostate cancer   • Diabetes Neg Hx        Allergies   Allergen Reactions   • Codeine    • Penicillins      States that it does not treat anything when she has been on it.        Current Outpatient Prescriptions   Medication Sig Dispense Refill   • albuterol (VENTOLIN HFA) 108 (90 BASE) MCG/ACT Aero Soln inhalation aerosol Inhale 2 Puffs by mouth every four hours as needed for Shortness of Breath.     • levothyroxine (SYNTHROID) 100 MCG Tab Take 1 Tab by mouth Every  "morning on an empty stomach. 90 Tab 3   • dronedarone (MULTAQ) 400 MG Tab Take 1 Tab by mouth 2 times a day, with meals. 60 Tab 11   • ALBUTEROL INH Inhale  by mouth 2 Times a Day.     • apixaban (ELIQUIS) 5mg Tab Take 1 Tab by mouth 2 Times a Day. 60 Tab 3   • PARAGARD INTRAUTERINE COPPER IUD by Intrauterine route.     • sertraline (ZOLOFT) 100 MG Tab Take 1 Tab by mouth every morning. 90 Tab 3     No current facility-administered medications for this visit.        Physical Exam:  Vitals:    09/07/17 1355   BP: 128/70   Pulse: 86   SpO2: 94%   Weight: 92.1 kg (203 lb)   Height: 1.676 m (5' 5.98\")     General appearance: NAD, conversant   Eyes: anicteric sclerae, moist conjunctivae; no lid-lag; PERRLA  HENT: Atraumatic; oropharynx clear with moist mucous membranes and no mucosal ulcerations; normal hard and soft palate  Neck: Trachea midline; FROM, supple, no thyromegaly or lymphadenopathy  Lungs: CTA, with normal respiratory effort and no intercostal retractions  CV: RRR, no MRGs, no JVD   Abdomen: Soft, non-tender; no masses or HSM  Extremities: No peripheral edema or extremity lymphadenopathy  Skin: Normal temperature, turgor and texture; no rash, ulcers or subcutaneous nodules  Psych: Appropriate affect, alert and oriented to person, place and time    Data:  Labs reviewed    Prior echo/stress results reviewed:  Preserved LV function    EKG interpreted by me:  NSR    Impression/Plan:  1. PAF (paroxysmal atrial fibrillation) (CMS-[facility])  EKG    ZIO PATCH MONITOR     -We talked about options going forward including staying on the Multaq vs alternative AAD vs ablation  -I am going to assess her AF burden with zio on the Multaq  -Will f/u after monitoring    Urbano Ye MD    "

## 2017-09-09 NOTE — PROGRESS NOTES
Subjective:   Nikki Aponte is a 47 y.o. female here today for sleep studies, a-fib, thyroid     TONY (obstructive sleep apnea)  She has upcoming studies in couple of weeks sleep studies.    Nonrheumatic aortic valve insufficiency  Moderate aortic insufficiency echocardiogram 02/2017. Not symptomatic. Defer to cards for further management.     Paroxysmal atrial fibrillation (CMS-HCC)  Persistent a-fib. Regular today. Denies shortness of breath, palpitation, dyspnea, orthopnea. She is on eliquis. dronaderone for rhythm and rate controled    Atypical nevus  She has a atypical nevous on her chest which is getting bigger. Ref to derm for further eval     Hypothyroidism  TSH slight elevated. She started to take levothyroxine 100 mcg daily. Feels better since then. She denies extreme fatigue, dry skin, hair loss, intolerance to cold or upper       Current medicines (including changes today)  Current Outpatient Prescriptions   Medication Sig Dispense Refill   • albuterol (VENTOLIN HFA) 108 (90 BASE) MCG/ACT Aero Soln inhalation aerosol Inhale 2 Puffs by mouth every four hours as needed for Shortness of Breath.     • levothyroxine (SYNTHROID) 100 MCG Tab Take 1 Tab by mouth Every morning on an empty stomach. 90 Tab 3   • dronedarone (MULTAQ) 400 MG Tab Take 1 Tab by mouth 2 times a day, with meals. 60 Tab 11   • ALBUTEROL INH Inhale  by mouth 2 Times a Day.     • apixaban (ELIQUIS) 5mg Tab Take 1 Tab by mouth 2 Times a Day. 60 Tab 3   • PARAGARD INTRAUTERINE COPPER IUD by Intrauterine route.     • sertraline (ZOLOFT) 100 MG Tab Take 1 Tab by mouth every morning. 90 Tab 3     No current facility-administered medications for this visit.      She  has a past medical history of Allergic rhinitis (4/4/2013); Aortic sclerosis (CMS-[facility]) (5/8/2014); Arrhythmia; Backpain; Breast cancer, right (CMS-HCC) (4/2016); Breath shortness; Bronchitis (2012); Cancer (CMS-HCC) (1990); Depression (4/4/2013); Endometriosis; Heart murmur; Hodgkin  disease (CMS-HCC); Hypothyroidism (4/4/2013); Migraine variant (4/4/2013); Paroxysmal atrial fibrillation (CMS-HCC); Psychiatric problem; Sleep apnea; and Valvular heart disease.    Current Outpatient Prescriptions   Medication Sig Dispense Refill   • albuterol (VENTOLIN HFA) 108 (90 BASE) MCG/ACT Aero Soln inhalation aerosol Inhale 2 Puffs by mouth every four hours as needed for Shortness of Breath.     • levothyroxine (SYNTHROID) 100 MCG Tab Take 1 Tab by mouth Every morning on an empty stomach. 90 Tab 3   • dronedarone (MULTAQ) 400 MG Tab Take 1 Tab by mouth 2 times a day, with meals. 60 Tab 11   • ALBUTEROL INH Inhale  by mouth 2 Times a Day.     • apixaban (ELIQUIS) 5mg Tab Take 1 Tab by mouth 2 Times a Day. 60 Tab 3   • PARAGARD INTRAUTERINE COPPER IUD by Intrauterine route.     • sertraline (ZOLOFT) 100 MG Tab Take 1 Tab by mouth every morning. 90 Tab 3     No current facility-administered medications for this visit.        Allergies as of 09/05/2017 - Reviewed 09/05/2017   Allergen Reaction Noted   • Codeine  08/01/2017   • Penicillins  12/01/2014       Social History     Social History   • Marital status: Single     Spouse name: N/A   • Number of children: N/A   • Years of education: N/A     Occupational History   • Not on file.     Social History Main Topics   • Smoking status: Former Smoker     Packs/day: 0.25     Years: 1.00     Quit date: 3/29/1991   • Smokeless tobacco: Never Used   • Alcohol use 0.0 oz/week      Comment: 1 per month   • Drug use: No   • Sexual activity: Yes     Birth control/ protection: IUD     Other Topics Concern   • Not on file     Social History Narrative   • No narrative on file        Family History   Problem Relation Age of Onset   • Stroke Mother    • Hypertension Mother    • Stroke Father    • Hypertension Father    • Cancer Father      prostate cancer   • Diabetes Neg Hx        Past Surgical History:   Procedure Laterality Date   • CARPAL TUNNEL RELEASE Right 4/21/2017     "Procedure: CARPAL TUNNEL RELEASE;  Surgeon: Shay Powers M.D.;  Location: SURGERY [facility];  Service:    • CARPAL TUNNEL RELEASE Left 3/31/2017    Procedure: CARPAL TUNNEL RELEASE;  Surgeon: Shay Powers M.D.;  Location: SURGERY [facility];  Service:    • BREAST BIOPSY Right 5/25/2016    Procedure: BREAST BIOPSY FOR: RE-EXCISION OF RT BREAST TISSUE ;  Surgeon: Patricia Romero M.D.;  Location: SURGERY SAME DAY [facility];  Service:    • PARTIAL MASTECTOMY Right 5/10/2016    Procedure: PARTIAL MASTECTOMY;  Surgeon: Patricia Romero M.D.;  Location: SURGERY SAME DAY [facility];  Service:    • US-NEEDLE CORE BX-BREAST PANEL         ROS   All systems reviewed are negative except for HPI         Objective:     Blood pressure 130/84, pulse 87, temperature 36.9 °C (98.4 °F), resp. rate 16, height 1.676 m (5' 6\"), weight 92.1 kg (203 lb), last menstrual period 08/11/2017, SpO2 94 %, not currently breastfeeding. Body mass index is 32.77 kg/m².   Physical Exam:  Constitutional: Alert, no distress.  Skin: Warm, dry, good turgor, no rashes in visible areas, atypical nevus.  Eye: Equal, round and reactive, conjunctiva clear, lids normal.  ENMT: Lips without lesions, good dentition, oropharynx clear.  Neck: Trachea midline, no masses, no thyromegaly. No cervical or supraclavicular lymphadenopathy  Respiratory: Unlabored respiratory effort, lungs clear to auscultation, no wheezes, no ronchi.  Cardiovascular: Normal S1, S2, + murmur, no edema.  Abdomen: Soft, non-tender, no masses, no hepatosplenomegaly.  Psych: Alert and oriented x3, normal affect and mood.        Assessment and Plan:   The following treatment plan was discussed    1. Paroxysmal atrial fibrillation (CMS-HCC)  On proper anticoagulation. No side effects from antiarrhythmic,     2. Hypothyroidism, unspecified type  Continue same treatment, continue to monitor   - TSH WITH REFLEX TO FT4; Future    3. TONY (obstructive sleep apnea)  Follow up " with sleep studies     4. Atypical nevus  Follow up with derm   - REFERRAL TO DERMATOLOGY    5. Need for vaccination  - INFLUENZA VACCINE QUAD INJ >3Y(PF)    6. Nonrheumatic aortic valve insufficiency  Continue to monitor. Moderate aortic insuffiencey        Followup: Return in about 3 months (around 12/5/2017) for Short.

## 2017-09-22 ENCOUNTER — NON-PROVIDER VISIT (OUTPATIENT)
Dept: CARDIOLOGY | Facility: MEDICAL CENTER | Age: 47
End: 2017-09-22
Attending: INTERNAL MEDICINE
Payer: COMMERCIAL

## 2017-09-22 DIAGNOSIS — I48.0 PAF (PAROXYSMAL ATRIAL FIBRILLATION) (HCC): ICD-10-CM

## 2017-09-27 ENCOUNTER — DOCUMENTATION (OUTPATIENT)
Dept: CARDIOLOGY | Facility: MEDICAL CENTER | Age: 47
End: 2017-09-27

## 2017-10-02 ENCOUNTER — SLEEP STUDY (OUTPATIENT)
Dept: SLEEP MEDICINE | Facility: MEDICAL CENTER | Age: 47
End: 2017-10-02
Attending: NURSE PRACTITIONER
Payer: COMMERCIAL

## 2017-10-02 DIAGNOSIS — G47.33 OSA (OBSTRUCTIVE SLEEP APNEA): Chronic | ICD-10-CM

## 2017-10-02 PROCEDURE — 95811 POLYSOM 6/>YRS CPAP 4/> PARM: CPT | Performed by: INTERNAL MEDICINE

## 2017-10-04 NOTE — PROCEDURES
CLINICAL COMMENTS:  The patient underwent a split night polysomnogram with a CPAP titration using the standard montage for measurement of parameters of sleep, respiratory events, movement abnormalities, heart rate and rhythm. A microphone was used to monitor snoring.    ANALYSIS: The diagnostic recording time was 237.6 minutes with a sleep period of 218.9 minutes.  Total sleep time was 194.5 minutes with a sleep efficiency of 81.9%.  The sleep latency was 18.7 minutes, and REM latency was 194.5 minutes.  The patient had 47 arousals in total, for an arousal index of 14.5.        RESPIRATORY: The patient had 1 apneas in total.  Of these, 1 were obstructive apneas, and 0 were central apneas.  This resulted in an apnea index (AI) of 0.3.  The patient had 48 hypopneas in total, which resulted in a hypopnea index of 14.8.  The overall AHI was 15.1, while the AHI during REM was 110.0.  The supine AHI = 15.1.    OXIMETRY: Oxygen saturation monitoring showed a mean SpO2 of 92.3% for the diagnostic part of the study, with a minimum oxygen saturation of 68.0%.  Oxygen saturations were below 89% for 5.9% of sleep time.    CARDIAC: The highest heart rate for the first part of the study was 102.0 beats per minute.  The average heart rate during sleep was 85.6 bpm, while the highest heart rate was 95.0 bpm.    LIMB MOVEMENTS: There were a total of 0 periodic limb movements during sleep, of which 0 were PLMS arousals.  This resulted in a PLMS index of 0.0 and a PLMS arousal index of 0.0.    TREATMENT    Treatment recording time was 303.9 minutes with a total sleep time of 229.0min.  The patient had an arousal index of 9.4.      RESPIRATORY: The patient had 2 obstructive apneas, 12 central apneas, and 58 hypopneas for an overall AHI was 18.9.    OXIMETRY: The mean SpO2 during treatment was 92.4%, with a minimum oxygen saturation of 85.0%.      Interpretation:    This is a split-night study.    In the diagnostic phase there is  fragmentation of sleep with increased wake after sleep onset time and an elevated arousal and awakening index.  Some slow-wave sleep and REM sleep time is recorded.  There are no periodic limb movements.  The apnea hypopnea index is 15.1 events per hour, consisting almost exclusively of hypopnea episodes with a single obstructive apnea.  Most of the sleep breathing events occur in rem sleep where the apnea hypopnea index is 110 events per hour.  The lowest arterial oxygen saturation is 68% on room air and she spends about 6% of the diagnostic time with a saturation below 90%.    In the treatment phase of the study there is continued fragmentation of sleep with increased wake after sleep onset time.  CPAP was adjusted across a pressure range of 5-13 cm water with persistence of hypopnea events, particularly during REM sleep.  The entire study was done in the supine body position.  BiPAP therapy was briefly used but did not seem to reduce the number of sleep breathing events.  A few treatment onset central apnea episodes were identified.  The apnea hypopnea index was 8.7 events per hour at a pressure of 8 cm water but in the later stages that included significant REM sleep time, the apnea hypopnea index was no less than 17 events per hour.  The average arterial oxygen saturation through the titration was about 91% on room air with a minimum of about 87%.    Assessment:  Mild to moderate obstructive sleep apnea hypopnea with an apnea hypopnea index of 15.1 events per hour, primarily occurring during REM sleep.  Severe nocturnal hypoxemia with a lowest arterial oxygen saturation of 68% on room air.  Fragmentation of sleep related to the breathing events and probably to laboratory and treatment effect as well.  There is a significant but incomplete response to CPAP therapy in the pressure ranges utilized.    Recommendations:  Options include follow-up polysomnography dedicated to further titration of therapy.   Alternatively auto titrating CPAP with a pressure range of perhaps 8-16 cm water might be considered with close clinical follow-up.  She did best with a small Dreamware apparatus but did require a wide chinstrap.

## 2017-10-05 ENCOUNTER — TELEPHONE (OUTPATIENT)
Dept: CARDIOLOGY | Facility: MEDICAL CENTER | Age: 47
End: 2017-10-05

## 2017-10-05 ENCOUNTER — SLEEP CENTER VISIT (OUTPATIENT)
Dept: SLEEP MEDICINE | Facility: MEDICAL CENTER | Age: 47
End: 2017-10-05
Payer: COMMERCIAL

## 2017-10-05 VITALS
SYSTOLIC BLOOD PRESSURE: 116 MMHG | RESPIRATION RATE: 16 BRPM | OXYGEN SATURATION: 98 % | HEIGHT: 66 IN | BODY MASS INDEX: 32.47 KG/M2 | HEART RATE: 91 BPM | DIASTOLIC BLOOD PRESSURE: 68 MMHG | TEMPERATURE: 97.7 F | WEIGHT: 202 LBS

## 2017-10-05 DIAGNOSIS — G47.33 OSA (OBSTRUCTIVE SLEEP APNEA): ICD-10-CM

## 2017-10-05 DIAGNOSIS — E66.9 OBESITY (BMI 30-39.9): ICD-10-CM

## 2017-10-05 PROCEDURE — 99213 OFFICE O/P EST LOW 20 MIN: CPT | Performed by: NURSE PRACTITIONER

## 2017-10-05 NOTE — PROGRESS NOTES
CC:  Here for f/u sleep and pulmonary issues as listed below    HPI:   Nikki presents today for follow up for TONY and asthma.  PFTs from 3/2017 indicate a Fev1 of 1.92L or 64% predicted, Fev1/FVC ratio of 72, DLCO 121%. She rarely uses rescue inhaler.     She was originally diagnosed in 2015 with mild TONY with an AHI of 20.4 and low oxygenation of 75% she did not pursue treatment at that time and attempted to work on weight loss, although she has increased weight since that time. Given ongoing symptoms she completed PSG 10/2017 which indicated an AHI of 15.1 that increases to 110 while in REM with  low oxygenation of 68%.  Reviewed results and patient is amendable to autotherapy. They understand they may need a future sleep study if treatment is ineffective. She reports she felt remarkably better the next day and slept better the night of the study.  Patient is currently sleeping 5 hours per night with 4 nighttime awakenings. They have no trouble falling asleep.   They do not feel refreshed in the morning and denies morning H/A. They feel tired throughout the day and naps 4x per week for appx 10-15 min long.  Patient reports snoring, apnea events and denies paroxysmal nocturnal dyspnea events. They have never fallen asleep in conversation, at the wheel, or at work.  They deny sleepwalking/talking.       Patient Active Problem List    Diagnosis Date Noted   • TONY (obstructive sleep apnea)      Priority: Medium   • Obesity (BMI 30-39.9) 05/04/2017     Priority: Medium   • Paroxysmal atrial fibrillation (CMS-HCC) 12/22/2016     Priority: Medium   • Nonrheumatic aortic valve insufficiency 05/08/2014     Priority: Medium   • History of breast cancer 05/17/2017     Priority: Low   • S/P radiation therapy - 1990 Mantle Radiation      Priority: Low   • Allergic rhinitis due to allergen 05/08/2014     Priority: Low   • History of Hodgkin's disease 12/30/2013     Priority: Low   • Hypothyroidism 04/04/2013     Priority: Low  "  • Depression 04/04/2013     Priority: Low   • Atypical nevus 09/05/2017   • Chronic anticoagulation 08/04/2017       Past Medical History:   Diagnosis Date   • Breast cancer, right (CMS-HCC) 4/2016   • Aortic sclerosis 5/8/2014   • Migraine variant 4/4/2013   • Depression 4/4/2013   • Allergic rhinitis 4/4/2013   • Hypothyroidism 4/4/2013   • Bronchitis 2012   • Cancer (CMS-HCC) 1990    hodgkins, previous chemo, radiation , breast cancer right side, lumpectomy   • Arrhythmia     \"Skips a beat\"   • Backpain    • Breath shortness     \"I have scar tissue in right lung from previous radiation, use my inhaler about once a week\"   • Endometriosis    • Heart murmur    • Hodgkin disease (CMS-HCC)    • Paroxysmal atrial fibrillation (CMS-HCC)    • Psychiatric problem    • Sleep apnea    • Valvular heart disease     moderate AI        Past Surgical History:   Procedure Laterality Date   • CARPAL TUNNEL RELEASE Right 4/21/2017    Procedure: CARPAL TUNNEL RELEASE;  Surgeon: Shay Powers M.D.;  Location: SURGERY [facility];  Service:    • CARPAL TUNNEL RELEASE Left 3/31/2017    Procedure: CARPAL TUNNEL RELEASE;  Surgeon: Shay Powers M.D.;  Location: SURGERY [facility];  Service:    • BREAST BIOPSY Right 5/25/2016    Procedure: BREAST BIOPSY FOR: RE-EXCISION OF RT BREAST TISSUE ;  Surgeon: Patricia Romero M.D.;  Location: SURGERY SAME DAY [facility];  Service:    • PARTIAL MASTECTOMY Right 5/10/2016    Procedure: PARTIAL MASTECTOMY;  Surgeon: Patricia Romero M.D.;  Location: SURGERY SAME DAY [facility];  Service:    • US-NEEDLE CORE BX-BREAST PANEL         Family History   Problem Relation Age of Onset   • Stroke Mother    • Hypertension Mother    • Stroke Father    • Hypertension Father    • Cancer Father      prostate cancer   • Diabetes Neg Hx        Social History     Social History   • Marital status: Single     Spouse name: N/A   • Number of children: N/A   • Years of education: N/A " "    Occupational History   • Not on file.     Social History Main Topics   • Smoking status: Former Smoker     Packs/day: 0.25     Years: 1.00     Quit date: 3/29/1991   • Smokeless tobacco: Never Used   • Alcohol use 0.0 oz/week      Comment: 1 per month   • Drug use: No   • Sexual activity: Yes     Birth control/ protection: IUD     Other Topics Concern   • Not on file     Social History Narrative   • No narrative on file       Current Outpatient Prescriptions   Medication Sig Dispense Refill   • ELIQUIS 5 MG Tab TAKE 1 TAB BY MOUTH 2 TIMES A DAY. 60 Tab 3   • albuterol (VENTOLIN HFA) 108 (90 BASE) MCG/ACT Aero Soln inhalation aerosol Inhale 2 Puffs by mouth every four hours as needed for Shortness of Breath.     • levothyroxine (SYNTHROID) 100 MCG Tab Take 1 Tab by mouth Every morning on an empty stomach. 90 Tab 3   • dronedarone (MULTAQ) 400 MG Tab Take 1 Tab by mouth 2 times a day, with meals. 60 Tab 11   • ALBUTEROL INH Inhale  by mouth 2 Times a Day.     • PARAGARD INTRAUTERINE COPPER IUD by Intrauterine route.     • sertraline (ZOLOFT) 100 MG Tab Take 1 Tab by mouth every morning. 90 Tab 3     No current facility-administered medications for this visit.           Allergies: Codeine and Penicillins      ROS   Gen: Denies fever, chills, unintentional weight loss, fatigue, night sweats  E/N/T: Denies ear pain, nasal congestion  Resp:Denies Dyspnea, wheezing, cough, sputum production, hemoptysis  CV: Denies chest pain, chest tightness, palpitations, BLE edema  Sleep:Denies morning headache, insomnia,  Neuro: Denies frequent headaches, weakness, dizziness  GI: Denies N/V, acid reflux/heartburn  See HPI.  All other systems reviewed and negative      Vital signs for this encounter:  Vitals:    10/05/17 1556   Height: 1.676 m (5' 6\")   Weight: 91.6 kg (202 lb)   Weight % change since last entry.: 0 %   BP: 116/68   Pulse: 91   BMI (Calculated): 32.6   Resp: 16   Temp: 36.5 °C (97.7 °F)   O2 sat % room air: 98 % "                 Physical Exam:   Appearance: well developed, well nourished, no acute distress.  Eyes: PERRL, EOM intact, sclere white, conjunctiva moist.  Ears: no lesions or deformities.  Hearing: grossly intact.  Nose: no lesions or deformities.  Dentition: good dentition.  Oropharynx: tongue normal, posterior pharynx without erythema or exudate.  Neck: supple, trachea midline, no masses.  Respiratory effort: no intercostal retractions or use of accessory muscles.  Lung auscultation: Bilateral diminished   Heart auscultation: no murmur, rub, or gallop.   Extremities: no cyanosis or edema.  Abdomen: soft, non-tender, no masses.  Gait and station: grossly normal   Digits and Nails: no clubbing, cyanosis, petechiae, or nodes.  Cranial nerves: grossly normal.  Motor: no focal deficits observed.  Skin: no rashes, lesions, or ulcers noted.  Orientation: oriented to time, place, and person.  Mood and affect: mood and affect appropriate, normal interaction with examiner.    Assessment   1. TONY (obstructive sleep apnea)  DME CPAP   2. Obesity (BMI 30-39.9)           PLAN:   Patient Instructions   1) Start autoCPAP at [id number]  2) Discussed acclimating to machine and change mask within first 30 days if required. Bring machine to first appointment.  3) Vaccines: Flu in the near future  4) Return in about 6 weeks (around 11/16/2017) for Compliance, review of symptoms, if not sooner, follow up with ANNABEL Agudelo.

## 2017-10-05 NOTE — TELEPHONE ENCOUNTER
----- Message from Debra Izquierdo sent at 10/5/2017 10:20 AM PDT -----  Regarding: [employer] calling with abnormal results  GABY/Esperanza Hart @ [employer] is calling with abnormal Ziop[employer] results. She can be reached at [phone].

## 2017-10-05 NOTE — PATIENT INSTRUCTIONS
1) Continue autoCPAP at [id number]  2) Discussed acclimating to machine and change mask within first 30 days if required. Bring machine to first appointment.  3) Vaccines: Flu in the near future  4) Return in about 6 weeks (around 11/16/2017) for Compliance, review of symptoms, if not sooner, follow up with ANNABEL Agudelo.

## 2017-10-05 NOTE — TELEPHONE ENCOUNTER
Spoke with Chriss at [employer]. Pt. Had episode of rapid A Fib (180 BPM for 60 sec.) on 9-27-17. He will fax strip.  Pt. Takes Multaq and Elialvina.  To Dr. Ye. Pt. Has 11-1-17 FV with SS.

## 2017-10-10 ENCOUNTER — TELEPHONE (OUTPATIENT)
Dept: CARDIOLOGY | Facility: MEDICAL CENTER | Age: 47
End: 2017-10-10

## 2017-10-10 PROCEDURE — 0296T PR EXT ECG > 48HR TO 21 DAY RCRD W/CONECT INTL RCRD: CPT | Performed by: INTERNAL MEDICINE

## 2017-10-10 PROCEDURE — 0298T PR EXT ECG > 48HR TO 21 DAY REVIEW AND INTERPRETATN: CPT | Performed by: INTERNAL MEDICINE

## 2017-10-10 NOTE — TELEPHONE ENCOUNTER
Message   Received: Yesterday   Message Contents   HUNTER Concepcion L.P.N.   Caller: Unspecified (5 days ago, 10:44 AM)             Okay lets finish the monitor and see her in follow-up to discuss

## 2017-10-10 NOTE — TELEPHONE ENCOUNTER
[facility] Per Dr. Ye: shows 34% A Fib. Keep Nov. FV to discuss options.  Left message for pt. To call.

## 2017-10-18 ENCOUNTER — TELEPHONE (OUTPATIENT)
Dept: SLEEP MEDICINE | Facility: MEDICAL CENTER | Age: 47
End: 2017-10-18

## 2017-10-18 NOTE — TELEPHONE ENCOUNTER
Patient called this morning and stated [facility] still has not received the Auto CPAP order.    Printed and faxed the order with study, interp, and last 2 sleep notes.

## 2017-10-25 NOTE — TELEPHONE ENCOUNTER
Called [facility], spoke with Drew.  Computers are currently down.  They are expecting to have the computers back up in about 45min.    1330 - Called [facility], no answer x 4 minutes.    1636 - Called [facility], spoke with Héctor.  Ordered received on 10/18/17.  T[facility] is working on this order.    1644 - Patient notified.

## 2017-11-01 ENCOUNTER — OFFICE VISIT (OUTPATIENT)
Dept: CARDIOLOGY | Facility: MEDICAL CENTER | Age: 47
End: 2017-11-01
Payer: COMMERCIAL

## 2017-11-01 ENCOUNTER — HOSPITAL ENCOUNTER (OUTPATIENT)
Dept: RADIOLOGY | Facility: MEDICAL CENTER | Age: 47
End: 2017-11-01
Attending: SURGERY
Payer: COMMERCIAL

## 2017-11-01 VITALS
DIASTOLIC BLOOD PRESSURE: 70 MMHG | HEIGHT: 67 IN | HEART RATE: 114 BPM | SYSTOLIC BLOOD PRESSURE: 100 MMHG | OXYGEN SATURATION: 94 % | WEIGHT: 200 LBS | BODY MASS INDEX: 31.39 KG/M2

## 2017-11-01 DIAGNOSIS — Z79.01 CHRONIC ANTICOAGULATION: ICD-10-CM

## 2017-11-01 DIAGNOSIS — G47.33 OSA (OBSTRUCTIVE SLEEP APNEA): Chronic | ICD-10-CM

## 2017-11-01 DIAGNOSIS — I48.0 PAROXYSMAL ATRIAL FIBRILLATION (HCC): ICD-10-CM

## 2017-11-01 DIAGNOSIS — Z85.71 HISTORY OF HODGKIN'S DISEASE: ICD-10-CM

## 2017-11-01 DIAGNOSIS — I48.0 PAF (PAROXYSMAL ATRIAL FIBRILLATION) (HCC): Primary | ICD-10-CM

## 2017-11-01 PROCEDURE — 700117 HCHG RX CONTRAST REV CODE 255: Performed by: SURGERY

## 2017-11-01 PROCEDURE — 99215 OFFICE O/P EST HI 40 MIN: CPT | Mod: 25 | Performed by: INTERNAL MEDICINE

## 2017-11-01 PROCEDURE — C8908 MRI W/O FOL W/CONT, BREAST,: HCPCS

## 2017-11-01 PROCEDURE — 93000 ELECTROCARDIOGRAM COMPLETE: CPT | Performed by: INTERNAL MEDICINE

## 2017-11-01 PROCEDURE — A9579 GAD-BASE MR CONTRAST NOS,1ML: HCPCS | Performed by: SURGERY

## 2017-11-01 RX ORDER — FLECAINIDE ACETATE 100 MG/1
100 TABLET ORAL 2 TIMES DAILY
Qty: 60 TAB | Refills: 3 | Status: SHIPPED | OUTPATIENT
Start: 2017-11-01 | End: 2018-04-02 | Stop reason: SDUPTHER

## 2017-11-01 RX ADMIN — GADODIAMIDE 20 ML: 287 INJECTION INTRAVENOUS at 14:55

## 2017-11-01 NOTE — PROGRESS NOTES
"Arrhythmia Clinic Note (Established patient)    DOS: 11/1/2017    Chief complaint/Reason for consult: PAF    Interval History:  Patient is a 46 yo with history of hodgkins lymphoma and breast cancer s/p prior radiation now with TONY and PAF. We had tried starting her on Multaq which she said worked for two weeks but she is back now with more persistent palpitations. Recent zio showed >30% AF.    ROS (+ highlighted in red):  Constitutional: Fevers/chills/fatigue/weightloss  Respiratory: Shortness of breath/cough  Cardiovascular: Chest pain/palpitations/edema/orthopnea/syncope    Past Medical History:   Diagnosis Date   • Allergic rhinitis 4/4/2013   • Aortic sclerosis 5/8/2014   • Arrhythmia     \"Skips a beat\"   • Backpain    • Breast cancer, right (CMS-HCC) 4/2016   • Breath shortness     \"I have scar tissue in right lung from previous radiation, use my inhaler about once a week\"   • Bronchitis 2012   • Cancer (CMS-HCC) 1990    hodgkins, previous chemo, radiation , breast cancer right side, lumpectomy   • Depression 4/4/2013   • Endometriosis    • Heart murmur    • Hodgkin disease (CMS-HCC)    • Hypothyroidism 4/4/2013   • Migraine variant 4/4/2013   • Paroxysmal atrial fibrillation (CMS-HCC)    • Psychiatric problem    • Sleep apnea    • Valvular heart disease     moderate AI        Allergies   Allergen Reactions   • Codeine    • Penicillins      States that it does not treat anything when she has been on it.        Current Outpatient Prescriptions   Medication Sig Dispense Refill   • flecainide (TAMBOCOR) 100 MG Tab Take 1 Tab by mouth 2 times a day. 60 Tab 3   • ELIQUIS 5 MG Tab TAKE 1 TAB BY MOUTH 2 TIMES A DAY. 60 Tab 3   • albuterol (VENTOLIN HFA) 108 (90 BASE) MCG/ACT Aero Soln inhalation aerosol Inhale 2 Puffs by mouth every four hours as needed for Shortness of Breath.     • levothyroxine (SYNTHROID) 100 MCG Tab Take 1 Tab by mouth Every morning on an empty stomach. 90 Tab 3   • ALBUTEROL INH Inhale  by " "mouth 2 Times a Day.     • PARAGARD INTRAUTERINE COPPER IUD by Intrauterine route.     • sertraline (ZOLOFT) 100 MG Tab Take 1 Tab by mouth every morning. 90 Tab 3     No current facility-administered medications for this visit.        Physical Exam:  Vitals:    11/01/17 1518   BP: 100/70   Pulse: (!) 114   SpO2: 94%   Weight: 90.7 kg (200 lb)   Height: 1.702 m (5' 7\")     General appearance: NAD, conversant   Eyes: anicteric sclerae, moist conjunctivae; no lid-lag; PERRLA  HENT: Atraumatic; oropharynx clear with moist mucous membranes and no mucosal ulcerations; normal hard and soft palate  Neck: Trachea midline; FROM, supple, no thyromegaly or lymphadenopathy  Lungs: CTA, with normal respiratory effort and no intercostal retractions  CV: Irregularly irregular, no MRGs, no JVD  Abdomen: Soft, non-tender; no masses or HSM  Extremities: No peripheral edema or extremity lymphadenopathy  Skin: Normal temperature, turgor and texture; no rash, ulcers or subcutaneous nodules  Psych: Appropriate affect, alert and oriented to person, place and time    Data:  Labs reviewed    Prior echo reviewed, normal LV function    EKG interpreted by me:  AF, RVR    Impression/Plan:  1. PAF (paroxysmal atrial fibrillation) (CMS-HCC)  EKG    NM-CARDIAC STRESS TEST   2. Paroxysmal atrial fibrillation (CMS-HCC)     3. TONY (obstructive sleep apnea)     4. Chronic anticoagulation       -Zio showing > 30% AF on Multaq  -She is in AF today  -I do not think her AAD regimen is working  -She feels lousy in AF and is quite symptomatic  -I discussed options going forward including alternative AAD and catheter ablation  -She is interested in pursuing catheter ablation  -Risks and benefits were discussed and we will proceed  -In the mean time we will switch to Flecainide pending normal MPI    Urbano Ye MD    "

## 2017-11-03 ENCOUNTER — TELEPHONE (OUTPATIENT)
Dept: CARDIOLOGY | Facility: MEDICAL CENTER | Age: 47
End: 2017-11-03

## 2017-11-03 ENCOUNTER — HOSPITAL ENCOUNTER (OUTPATIENT)
Facility: MEDICAL CENTER | Age: 47
End: 2017-11-03
Attending: INTERNAL MEDICINE
Payer: COMMERCIAL

## 2017-11-03 ENCOUNTER — TELEPHONE (OUTPATIENT)
Dept: SLEEP MEDICINE | Facility: MEDICAL CENTER | Age: 47
End: 2017-11-03

## 2017-11-03 ENCOUNTER — OFFICE VISIT (OUTPATIENT)
Dept: DERMATOLOGY | Facility: IMAGING CENTER | Age: 47
End: 2017-11-03
Payer: COMMERCIAL

## 2017-11-03 DIAGNOSIS — D48.5 NEOPLASM OF UNCERTAIN BEHAVIOR OF SCALP: ICD-10-CM

## 2017-11-03 LAB — EKG IMPRESSION: NORMAL

## 2017-11-03 PROCEDURE — 88305 TISSUE EXAM BY PATHOLOGIST: CPT

## 2017-11-03 PROCEDURE — 11421 EXC H-F-NK-SP B9+MARG 0.6-1: CPT | Performed by: INTERNAL MEDICINE

## 2017-11-03 NOTE — PROGRESS NOTES
Nikki has a lesion on her left scalp/temple area that she feels is new over the past several months and seems to be growing.     On exam: there is an elliptical, slightly irregular and raised dark mole within the hair of her left posterior temporal scalp, ~ 1 cm in diameter.    Explained the shave biopsy procedure with the understanding that there would be a small patch of hair loss. She is also on eliquis, so extra attention to hemostasis and wound care. Consent for shaqve biopsy obtained.    Sub Q tissue injected with 3/4 cc of 2% xylo with epi. Shave biopsy blade utilized and lesion lifted and palced in formalin. Specimen sent for pathology. Topical aluminum chloride achieved good hemostasis and pressure dressing fashioned with her [employer] frame.    She will be out of town for 3 weeks and will call the clinic next week for pathology results.

## 2017-11-03 NOTE — TELEPHONE ENCOUNTER
[place] [facility] [place]  [facility] /  [phone] / fax [phone] came in. I gave him the order and all necessary notes and let him know the situation. He said he will take care of it. I called patient and let her know this.

## 2017-11-03 NOTE — TELEPHONE ENCOUNTER
Patient scheduled for an afib ablation w/PARVEZ on 12-7-17 at [facility] with Dr. Ye.

## 2017-11-03 NOTE — TELEPHONE ENCOUNTER
Pt. Left message stating she still hasnt heard from [facility]. I called [facility] and left a message for her to call me back with the status. From previous note, they stated they received order on 10/18.

## 2017-12-01 ENCOUNTER — HOSPITAL ENCOUNTER (OUTPATIENT)
Dept: RADIOLOGY | Facility: MEDICAL CENTER | Age: 47
End: 2017-12-01
Attending: INTERNAL MEDICINE
Payer: COMMERCIAL

## 2017-12-01 ENCOUNTER — APPOINTMENT (OUTPATIENT)
Dept: DERMATOLOGY | Facility: IMAGING CENTER | Age: 47
End: 2017-12-01

## 2017-12-01 DIAGNOSIS — I48.0 PAF (PAROXYSMAL ATRIAL FIBRILLATION) (HCC): ICD-10-CM

## 2017-12-01 PROCEDURE — 700111 HCHG RX REV CODE 636 W/ 250 OVERRIDE (IP)

## 2017-12-01 PROCEDURE — A9502 TC99M TETROFOSMIN: HCPCS

## 2017-12-01 RX ORDER — REGADENOSON 0.08 MG/ML
INJECTION, SOLUTION INTRAVENOUS
Status: COMPLETED
Start: 2017-12-01 | End: 2017-12-01

## 2017-12-01 RX ADMIN — REGADENOSON 0.4 MG: 0.08 INJECTION, SOLUTION INTRAVENOUS at 10:18

## 2017-12-01 NOTE — PROGRESS NOTES
Nursing care plan includes knowledge deficit, potential for discomfort, potential for compromised cardiac output. POC includes teaching, comfort measures and reassurance, and access to code cart, cardiology stand by and availability of rapid response team. Pt verbalizes good understanding of benefits and risks of pharmacological cardiac stress test. Informed consent obtained. Lexiscan given, pt developed the following symptoms lightheadedness and SOB. VS stable, major symptoms resolved. To waiting room, caffeinated fluids and/or snacks given, awaiting second scan. Nursing goals met.

## 2017-12-04 ENCOUNTER — TELEPHONE (OUTPATIENT)
Dept: CARDIOLOGY | Facility: MEDICAL CENTER | Age: 47
End: 2017-12-04

## 2017-12-04 NOTE — TELEPHONE ENCOUNTER
Spoke with pt. She had MPI on Fri. Per Dr. Ye's OV dictation, if MPI is normal, she can start Flecainide 100mg BID. Pt. Is scheduled for ablation 12-7-17.   MPI is normal. Pt. Will tonight's dose of Multaq and start Flecainide tomorrow, proceed with ablation as scheduled.  Pt. Advised.        ---- Message from Debra Izquierdo sent at 12/4/2017 11:27 AM PST -----  Regarding: change in blood pressure medication  SS/Esperanza      Patient is calling to discuss changes to her medication. She can be reached at [phone].

## 2017-12-05 ENCOUNTER — HOSPITAL ENCOUNTER (OUTPATIENT)
Dept: LAB | Facility: MEDICAL CENTER | Age: 47
End: 2017-12-05
Attending: FAMILY MEDICINE
Payer: COMMERCIAL

## 2017-12-05 ENCOUNTER — HOSPITAL ENCOUNTER (OUTPATIENT)
Dept: LAB | Facility: MEDICAL CENTER | Age: 47
End: 2017-12-05
Attending: INTERNAL MEDICINE
Payer: COMMERCIAL

## 2017-12-05 DIAGNOSIS — E03.9 HYPOTHYROIDISM, UNSPECIFIED TYPE: ICD-10-CM

## 2017-12-05 DIAGNOSIS — E03.8 OTHER SPECIFIED HYPOTHYROIDISM: ICD-10-CM

## 2017-12-05 DIAGNOSIS — E78.5 DYSLIPIDEMIA: ICD-10-CM

## 2017-12-05 DIAGNOSIS — Z01.812 PRE-PROCEDURAL LABORATORY EXAMINATIONS: ICD-10-CM

## 2017-12-05 DIAGNOSIS — E66.9 OBESITY (BMI 30-39.9): ICD-10-CM

## 2017-12-05 DIAGNOSIS — I35.1 NONRHEUMATIC AORTIC VALVE INSUFFICIENCY: ICD-10-CM

## 2017-12-05 LAB
ALBUMIN SERPL BCP-MCNC: 4.5 G/DL (ref 3.2–4.9)
ALBUMIN SERPL BCP-MCNC: 4.5 G/DL (ref 3.2–4.9)
ALBUMIN/GLOB SERPL: 1.5 G/DL
ALBUMIN/GLOB SERPL: 1.5 G/DL
ALP SERPL-CCNC: 69 U/L (ref 30–99)
ALP SERPL-CCNC: 73 U/L (ref 30–99)
ALT SERPL-CCNC: 18 U/L (ref 2–50)
ALT SERPL-CCNC: 21 U/L (ref 2–50)
ANION GAP SERPL CALC-SCNC: 9 MMOL/L (ref 0–11.9)
ANION GAP SERPL CALC-SCNC: 9 MMOL/L (ref 0–11.9)
AST SERPL-CCNC: 20 U/L (ref 12–45)
AST SERPL-CCNC: 20 U/L (ref 12–45)
BASOPHILS # BLD AUTO: 1.1 % (ref 0–1.8)
BASOPHILS # BLD: 0.07 K/UL (ref 0–0.12)
BILIRUB SERPL-MCNC: 0.3 MG/DL (ref 0.1–1.5)
BILIRUB SERPL-MCNC: 0.4 MG/DL (ref 0.1–1.5)
BUN SERPL-MCNC: 13 MG/DL (ref 8–22)
BUN SERPL-MCNC: 15 MG/DL (ref 8–22)
CALCIUM SERPL-MCNC: 9.5 MG/DL (ref 8.5–10.5)
CALCIUM SERPL-MCNC: 9.8 MG/DL (ref 8.5–10.5)
CHLORIDE SERPL-SCNC: 103 MMOL/L (ref 96–112)
CHLORIDE SERPL-SCNC: 103 MMOL/L (ref 96–112)
CHOLEST SERPL-MCNC: 247 MG/DL (ref 100–199)
CO2 SERPL-SCNC: 26 MMOL/L (ref 20–33)
CO2 SERPL-SCNC: 27 MMOL/L (ref 20–33)
CREAT SERPL-MCNC: 0.73 MG/DL (ref 0.5–1.4)
CREAT SERPL-MCNC: 0.75 MG/DL (ref 0.5–1.4)
EOSINOPHIL # BLD AUTO: 0.31 K/UL (ref 0–0.51)
EOSINOPHIL NFR BLD: 4.7 % (ref 0–6.9)
ERYTHROCYTE [DISTWIDTH] IN BLOOD BY AUTOMATED COUNT: 42.5 FL (ref 35.9–50)
GFR SERPL CREATININE-BSD FRML MDRD: >60 ML/MIN/1.73 M 2
GFR SERPL CREATININE-BSD FRML MDRD: >60 ML/MIN/1.73 M 2
GLOBULIN SER CALC-MCNC: 3.1 G/DL (ref 1.9–3.5)
GLOBULIN SER CALC-MCNC: 3.1 G/DL (ref 1.9–3.5)
GLUCOSE SERPL-MCNC: 105 MG/DL (ref 65–99)
GLUCOSE SERPL-MCNC: 81 MG/DL (ref 65–99)
HCG SERPL QL: NEGATIVE
HCT VFR BLD AUTO: 42.7 % (ref 37–47)
HDLC SERPL-MCNC: 56 MG/DL
HGB BLD-MCNC: 14.1 G/DL (ref 12–16)
IMM GRANULOCYTES # BLD AUTO: 0.03 K/UL (ref 0–0.11)
IMM GRANULOCYTES NFR BLD AUTO: 0.5 % (ref 0–0.9)
INR PPP: 1.04 (ref 0.87–1.13)
LDLC SERPL CALC-MCNC: 153 MG/DL
LYMPHOCYTES # BLD AUTO: 1.48 K/UL (ref 1–4.8)
LYMPHOCYTES NFR BLD: 22.5 % (ref 22–41)
MCH RBC QN AUTO: 29.6 PG (ref 27–33)
MCHC RBC AUTO-ENTMCNC: 33 G/DL (ref 33.6–35)
MCV RBC AUTO: 89.5 FL (ref 81.4–97.8)
MONOCYTES # BLD AUTO: 0.48 K/UL (ref 0–0.85)
MONOCYTES NFR BLD AUTO: 7.3 % (ref 0–13.4)
NEUTROPHILS # BLD AUTO: 4.2 K/UL (ref 2–7.15)
NEUTROPHILS NFR BLD: 63.9 % (ref 44–72)
NRBC # BLD AUTO: 0 K/UL
NRBC BLD AUTO-RTO: 0 /100 WBC
PLATELET # BLD AUTO: 270 K/UL (ref 164–446)
PMV BLD AUTO: 9.6 FL (ref 9–12.9)
POTASSIUM SERPL-SCNC: 3.8 MMOL/L (ref 3.6–5.5)
POTASSIUM SERPL-SCNC: 4 MMOL/L (ref 3.6–5.5)
PROT SERPL-MCNC: 7.6 G/DL (ref 6–8.2)
PROT SERPL-MCNC: 7.6 G/DL (ref 6–8.2)
PROTHROMBIN TIME: 13.3 SEC (ref 12–14.6)
RBC # BLD AUTO: 4.77 M/UL (ref 4.2–5.4)
SODIUM SERPL-SCNC: 138 MMOL/L (ref 135–145)
SODIUM SERPL-SCNC: 139 MMOL/L (ref 135–145)
T4 FREE SERPL-MCNC: 1.01 NG/DL (ref 0.53–1.43)
TRIGL SERPL-MCNC: 189 MG/DL (ref 0–149)
TSH SERPL DL<=0.005 MIU/L-ACNC: 5.3 UIU/ML (ref 0.3–3.7)
TSH SERPL DL<=0.005 MIU/L-ACNC: 5.51 UIU/ML (ref 0.3–3.7)
WBC # BLD AUTO: 6.6 K/UL (ref 4.8–10.8)

## 2017-12-05 PROCEDURE — 85025 COMPLETE CBC W/AUTO DIFF WBC: CPT

## 2017-12-05 PROCEDURE — 84703 CHORIONIC GONADOTROPIN ASSAY: CPT

## 2017-12-05 PROCEDURE — 80053 COMPREHEN METABOLIC PANEL: CPT | Mod: 91

## 2017-12-05 PROCEDURE — 84443 ASSAY THYROID STIM HORMONE: CPT | Mod: 91

## 2017-12-05 PROCEDURE — 84439 ASSAY OF FREE THYROXINE: CPT

## 2017-12-05 PROCEDURE — 80061 LIPID PANEL: CPT

## 2017-12-05 PROCEDURE — 80053 COMPREHEN METABOLIC PANEL: CPT

## 2017-12-05 PROCEDURE — 84443 ASSAY THYROID STIM HORMONE: CPT

## 2017-12-05 PROCEDURE — 36415 COLL VENOUS BLD VENIPUNCTURE: CPT

## 2017-12-05 PROCEDURE — 85610 PROTHROMBIN TIME: CPT

## 2017-12-05 RX ORDER — DIPHENHYDRAMINE HCL 25 MG
25 TABLET ORAL PRN
COMMUNITY

## 2017-12-06 ENCOUNTER — TELEPHONE (OUTPATIENT)
Dept: MEDICAL GROUP | Facility: PHYSICIAN GROUP | Age: 47
End: 2017-12-06

## 2017-12-06 NOTE — TELEPHONE ENCOUNTER
----- Message from Ruddy Mansfield M.D. sent at 12/6/2017 11:56 AM PST -----  Please let pt knows that thyroid is slight elevated, meaning that she needs slight higher dose of thyroid medication. Also cholesterol is slight elevated. I will discuss more the results and other treatment options at her next apt.  Thank you,  Ruddy Mansfield M.D.

## 2017-12-07 ENCOUNTER — HOSPITAL ENCOUNTER (OUTPATIENT)
Facility: MEDICAL CENTER | Age: 47
End: 2017-12-08
Attending: INTERNAL MEDICINE | Admitting: INTERNAL MEDICINE
Payer: COMMERCIAL

## 2017-12-07 LAB
ACT BLD: 268 SEC (ref 74–137)
ACT BLD: 312 SEC (ref 74–137)
ACT BLD: 312 SEC (ref 74–137)
EKG IMPRESSION: NORMAL

## 2017-12-07 PROCEDURE — C1759 CATH, INTRA ECHOCARDIOGRAPHY: HCPCS

## 2017-12-07 PROCEDURE — 85347 COAGULATION TIME ACTIVATED: CPT

## 2017-12-07 PROCEDURE — 700102 HCHG RX REV CODE 250 W/ 637 OVERRIDE(OP): Performed by: INTERNAL MEDICINE

## 2017-12-07 PROCEDURE — 305387 HCHG SUTURES

## 2017-12-07 PROCEDURE — C1732 CATH, EP, DIAG/ABL, 3D/VECT: HCPCS

## 2017-12-07 PROCEDURE — 93656 COMPRE EP EVAL ABLTJ ATR FIB: CPT

## 2017-12-07 PROCEDURE — 93613 INTRACARDIAC EPHYS 3D MAPG: CPT

## 2017-12-07 PROCEDURE — 700111 HCHG RX REV CODE 636 W/ 250 OVERRIDE (IP)

## 2017-12-07 PROCEDURE — 360995 HCHG BAYLIS TRANSSEPTAL NEEDLE

## 2017-12-07 PROCEDURE — C1893 INTRO/SHEATH, FIXED,NON-PEEL: HCPCS

## 2017-12-07 PROCEDURE — 360980 HCHG SINGLE TRANSDUCER

## 2017-12-07 PROCEDURE — 304952 HCHG R 2 PADS

## 2017-12-07 PROCEDURE — C1730 CATH, EP, 19 OR FEW ELECT: HCPCS

## 2017-12-07 PROCEDURE — 00537 ANES CARDIAC EP PROCEDURES: CPT

## 2017-12-07 PROCEDURE — 700101 HCHG RX REV CODE 250

## 2017-12-07 PROCEDURE — C1894 INTRO/SHEATH, NON-LASER: HCPCS

## 2017-12-07 PROCEDURE — 93662 INTRACARDIAC ECG (ICE): CPT

## 2017-12-07 PROCEDURE — 36620 INSERTION CATHETER ARTERY: CPT

## 2017-12-07 PROCEDURE — 305383 HCHG CARTO3 REF PATCH

## 2017-12-07 PROCEDURE — G0378 HOSPITAL OBSERVATION PER HR: HCPCS

## 2017-12-07 PROCEDURE — 160002 HCHG RECOVERY MINUTES (STAT)

## 2017-12-07 PROCEDURE — 305545 HCHG DOUBLE TRANSDUCER

## 2017-12-07 PROCEDURE — 93010 ELECTROCARDIOGRAM REPORT: CPT | Performed by: INTERNAL MEDICINE

## 2017-12-07 PROCEDURE — A9270 NON-COVERED ITEM OR SERVICE: HCPCS | Performed by: INTERNAL MEDICINE

## 2017-12-07 PROCEDURE — 93005 ELECTROCARDIOGRAM TRACING: CPT | Performed by: INTERNAL MEDICINE

## 2017-12-07 RX ORDER — ADENOSINE 3 MG/ML
INJECTION, SOLUTION INTRAVENOUS
Status: COMPLETED
Start: 2017-12-07 | End: 2017-12-07

## 2017-12-07 RX ORDER — HEPARIN SODIUM,PORCINE 1000/ML
VIAL (ML) INJECTION
Status: COMPLETED
Start: 2017-12-07 | End: 2017-12-07

## 2017-12-07 RX ORDER — SUCRALFATE 1 G/1
1 TABLET ORAL
Status: DISCONTINUED | OUTPATIENT
Start: 2017-12-07 | End: 2017-12-08 | Stop reason: HOSPADM

## 2017-12-07 RX ORDER — OMEPRAZOLE 20 MG/1
20 CAPSULE, DELAYED RELEASE ORAL
Status: DISCONTINUED | OUTPATIENT
Start: 2017-12-07 | End: 2017-12-08 | Stop reason: HOSPADM

## 2017-12-07 RX ORDER — SERTRALINE HYDROCHLORIDE 100 MG/1
100 TABLET, FILM COATED ORAL EVERY MORNING
Status: DISCONTINUED | OUTPATIENT
Start: 2017-12-08 | End: 2017-12-08 | Stop reason: HOSPADM

## 2017-12-07 RX ORDER — METOPROLOL SUCCINATE 25 MG/1
25 TABLET, EXTENDED RELEASE ORAL
Status: DISCONTINUED | OUTPATIENT
Start: 2017-12-08 | End: 2017-12-08 | Stop reason: HOSPADM

## 2017-12-07 RX ORDER — COLCHICINE 0.6 MG/1
0.6 TABLET ORAL DAILY
Status: DISCONTINUED | OUTPATIENT
Start: 2017-12-07 | End: 2017-12-08 | Stop reason: HOSPADM

## 2017-12-07 RX ORDER — PROTAMINE SULFATE 10 MG/ML
INJECTION, SOLUTION INTRAVENOUS
Status: COMPLETED
Start: 2017-12-07 | End: 2017-12-07

## 2017-12-07 RX ORDER — LEVOTHYROXINE SODIUM 0.1 MG/1
100 TABLET ORAL
Status: DISCONTINUED | OUTPATIENT
Start: 2017-12-08 | End: 2017-12-08 | Stop reason: HOSPADM

## 2017-12-07 RX ORDER — LIDOCAINE HYDROCHLORIDE 20 MG/ML
INJECTION, SOLUTION INFILTRATION; PERINEURAL
Status: COMPLETED
Start: 2017-12-07 | End: 2017-12-07

## 2017-12-07 RX ORDER — MIDAZOLAM HYDROCHLORIDE 1 MG/ML
INJECTION INTRAMUSCULAR; INTRAVENOUS
Status: DISPENSED
Start: 2017-12-07 | End: 2017-12-07

## 2017-12-07 RX ORDER — FLECAINIDE ACETATE 100 MG/1
100 TABLET ORAL 2 TIMES DAILY
Status: DISCONTINUED | OUTPATIENT
Start: 2017-12-07 | End: 2017-12-08 | Stop reason: HOSPADM

## 2017-12-07 RX ADMIN — FLECAINIDE ACETATE 100 MG: 100 TABLET ORAL at 19:44

## 2017-12-07 RX ADMIN — HEPARIN SODIUM 2000 UNITS: 200 INJECTION, SOLUTION INTRAVENOUS at 10:09

## 2017-12-07 RX ADMIN — COLCHICINE 0.6 MG: 0.6 TABLET, FILM COATED ORAL at 16:45

## 2017-12-07 RX ADMIN — SUCRALFATE 1 G: 1 TABLET ORAL at 19:44

## 2017-12-07 RX ADMIN — LIDOCAINE HYDROCHLORIDE: 20 INJECTION, SOLUTION INFILTRATION; PERINEURAL at 10:09

## 2017-12-07 RX ADMIN — PROTAMINE SULFATE 50 MG: 10 INJECTION, SOLUTION INTRAVENOUS at 11:58

## 2017-12-07 RX ADMIN — ADENOSINE 12 MG: 3 INJECTION, SOLUTION INTRAVENOUS at 11:50

## 2017-12-07 RX ADMIN — ADENOSINE 12 MG: 3 INJECTION, SOLUTION INTRAVENOUS at 11:30

## 2017-12-07 RX ADMIN — SUCRALFATE 1 G: 1 TABLET ORAL at 16:45

## 2017-12-07 RX ADMIN — ADENOSINE 12 MG: 3 INJECTION, SOLUTION INTRAVENOUS at 11:43

## 2017-12-07 RX ADMIN — APIXABAN 5 MG: 5 TABLET, FILM COATED ORAL at 19:44

## 2017-12-07 RX ADMIN — ADENOSINE 6 MG: 3 INJECTION, SOLUTION INTRAVENOUS at 11:30

## 2017-12-07 RX ADMIN — HEPARIN SODIUM: 1000 INJECTION, SOLUTION INTRAVENOUS; SUBCUTANEOUS at 10:20

## 2017-12-07 RX ADMIN — OMEPRAZOLE 20 MG: 20 CAPSULE, DELAYED RELEASE ORAL at 16:45

## 2017-12-07 RX ADMIN — ADENOSINE 12 MG: 3 INJECTION, SOLUTION INTRAVENOUS at 11:32

## 2017-12-07 ASSESSMENT — LIFESTYLE VARIABLES
HAVE YOU EVER FELT YOU SHOULD CUT DOWN ON YOUR DRINKING: NO
ON A TYPICAL DAY WHEN YOU DRINK ALCOHOL HOW MANY DRINKS DO YOU HAVE: 1
HAVE PEOPLE ANNOYED YOU BY CRITICIZING YOUR DRINKING: NO
EVER_SMOKED: NEVER
EVER FELT BAD OR GUILTY ABOUT YOUR DRINKING: NO
CONSUMPTION TOTAL: NEGATIVE
AVERAGE NUMBER OF DAYS PER WEEK YOU HAVE A DRINK CONTAINING ALCOHOL: 0
TOTAL SCORE: 0
HOW MANY TIMES IN THE PAST YEAR HAVE YOU HAD 5 OR MORE DRINKS IN A DAY: 0
ALCOHOL_USE: YES
TOTAL SCORE: 0
TOTAL SCORE: 0
EVER HAD A DRINK FIRST THING IN THE MORNING TO STEADY YOUR NERVES TO GET RID OF A HANGOVER: NO

## 2017-12-07 ASSESSMENT — COGNITIVE AND FUNCTIONAL STATUS - GENERAL
SUGGESTED CMS G CODE MODIFIER DAILY ACTIVITY: CH
DAILY ACTIVITIY SCORE: 24
MOBILITY SCORE: 24
SUGGESTED CMS G CODE MODIFIER MOBILITY: CH

## 2017-12-07 ASSESSMENT — PAIN SCALES - GENERAL
PAINLEVEL_OUTOF10: 0

## 2017-12-07 ASSESSMENT — PATIENT HEALTH QUESTIONNAIRE - PHQ9
SUM OF ALL RESPONSES TO PHQ9 QUESTIONS 1 AND 2: 0
2. FEELING DOWN, DEPRESSED, IRRITABLE, OR HOPELESS: NOT AT ALL
1. LITTLE INTEREST OR PLEASURE IN DOING THINGS: NOT AT ALL
SUM OF ALL RESPONSES TO PHQ QUESTIONS 1-9: 0

## 2017-12-07 NOTE — OP REPORT
[facility] Electrophysiology Procedure Note    Procedure Performed:   1)Atrial fibrillation ablation and EP study  2)3D mapping  3)ICE during diagnostic and intervention  4)Arrhythmia induction with drug infusion    Indication(s):  Symptomatic PAF refractory to antiarrhythmics    Physician(s): Urbano Ye M.D.     Resident/Assistant(s): None     Anesthesia: General endotracheal anesthetic.     Specimen(s) Removed: None     Estimated Blood Loss:  30cc     Complications:  None     Description of Procedure:   After informed written consent, the patient was brought to the EP lab in the fasting, non-sedated state. The patient was prepped and draped in the usual sterile fashion. Femoral venous access was obtained using the modified Seldinger technique. In the left femoral vein, 2 sheaths (6, 8 Fr) were inserted over 0.35” guidewires. In the right femoral vein, 2 sheaths (8,8 Fr) were inserted over 0.35” guidewires. A deflectable decapolar catheter was advanced to the CS position. An intracardiac echo (ICE) catheter was advanced to the right atrium. ICE was used to identify the atrial septum, left atrial appendage, and pulmonary veins and jericho the anatomic structures to superimpose on our 3D electroanatomic map using CARTOSound. During the procedure, ICE was utilized to localize the ablation catheter, monitor for thrombus formation, and to exclude pericardial effusion. Intravenous heparin was administered to maintain -350 seconds. Double transseptal left heart catheterization was performed under intracardiac echo and hemodynamic guidance. A Phoenix needle was inserted into the 8 Fr sheaths (SL1) for transseptal puncture and placement of sheaths in the left atrium. Mapping of the pulmonary veins and left atrium was performed using CARTO3 FAM and a deflectable multipolar mapping catheter (Biose-contratosaRay). Wide antral circumferential ablation was performed using an 3.5 mm deflectable irrigated force contact  catheter (VISEO) at primarily 30 W power starting from the L sided veins (left common) and then proceeding along to the RSPV and then finally the RIPV. Bidirectional pulmonary vein antrum isolation was verified at the end of the procedure by pacing inside the vein and confirming exit block along with absence of local PV signals on the PentaRay. We infused 12 mg of IV adenosine to produce transient AV block while looking for dormant conduction inside the pulmonary veins. We found dormant conduction along the L sided veins that was transient. We ablated further along the coumadin ridge, roof, and high posterior wall where we initially achieved isolation. Adenosine was repeated showing no further dormant conduction. At the end of the procedure, heparin was reversed with protamine, the catheter and sheaths were removed, and hemostasis was achieved by manual compression. Following recovery from anesthesia, the patient was transferred to the PPU in good condition.       Total ablation time: 1982 seconds    Fluoroscopy time: 2.9 minutes     Electrophysiologic Findings:    1. Sinus  ms,  ms, HV 35 ms. AVBCL = 290 ms.    Impressions:    1. Paroxysmal atrial fibrillation.    2. Successful RF ablation pulmonary vein isolation procedure.       Recommendations:  1. Resume anticoagulation and antiarrhythmics (BB + flecainide)  2. Start colchicine 0.6 BID x 2 weeks and daily PPI x 1 month.  3. Admit to monitored bedrest.

## 2017-12-07 NOTE — PROGRESS NOTES
Patient admitted to T-813/1 . Tele box on, patient assessed, vital signs stable. Oriented patient to room, skylight, and how to use call light. Call light within reach, treaded socks on.   Pt refused bed alarm at this time.

## 2017-12-07 NOTE — OR NURSING
1227 patient arrived from cath lab s/p a.fib ablation right and left groin approach dressing clean dry intact, vss, patient denies pain, artline right radial  s.o.b, plan of care discussed with patient and family agreeable.  1312 artline discontinued tip intact no bleeding no hematoma. Placed gauze and tegaderm  1337 patient tolerating oral fluids and snacks.  1443 report given to yuniel T813 bed 1 all questions answered   1445 patient transported to room with all his personal belongings.  1455 checked bilateral groin dressing with ROZ Alcala no bleeding no hematoma.

## 2017-12-07 NOTE — H&P
"CLARISSA Pre-procedure History and Physical    Date of service: 12/7/2017    Reason for visit/Chief complaint: PAF    HPI:   Patient is a 48 yo F with history of TONY and symptomatic PAF who failed Multaq here for AF ablation. In the interim we have switched her to Flecainide.    ROS: Negative for orthopnea, PND, palpitations, chest pain    Physical Exam:  Vitals:    12/05/17 1515   Weight: 91.9 kg (202 lb 9.6 oz)   Height: 1.702 m (5' 7\")     Gen: NAD, conversant  HEENT: PERRL, EOMI  LUNGS: CTA B, no w/r/r  CV: RRR, no m/r/g, no JVD  Abd: Soft, NT/ND, +BS  Ext: no edema, warm and well perfused    Labs reviewed    EKG interpreted by me:  Sinus    Impression/Recs:  1)PAF  2)TONY    -Proceed with AF ablation    Urbano Ye MD    "

## 2017-12-08 VITALS
BODY MASS INDEX: 32.11 KG/M2 | WEIGHT: 204.59 LBS | TEMPERATURE: 98.1 F | RESPIRATION RATE: 18 BRPM | SYSTOLIC BLOOD PRESSURE: 99 MMHG | HEART RATE: 75 BPM | OXYGEN SATURATION: 95 % | DIASTOLIC BLOOD PRESSURE: 53 MMHG | HEIGHT: 67 IN

## 2017-12-08 LAB — EKG IMPRESSION: NORMAL

## 2017-12-08 PROCEDURE — 93005 ELECTROCARDIOGRAM TRACING: CPT | Performed by: INTERNAL MEDICINE

## 2017-12-08 PROCEDURE — G0378 HOSPITAL OBSERVATION PER HR: HCPCS

## 2017-12-08 PROCEDURE — 700102 HCHG RX REV CODE 250 W/ 637 OVERRIDE(OP): Performed by: INTERNAL MEDICINE

## 2017-12-08 PROCEDURE — 93010 ELECTROCARDIOGRAM REPORT: CPT | Performed by: INTERNAL MEDICINE

## 2017-12-08 PROCEDURE — A9270 NON-COVERED ITEM OR SERVICE: HCPCS | Performed by: INTERNAL MEDICINE

## 2017-12-08 RX ORDER — COLCHICINE 0.6 MG/1
0.6 TABLET ORAL DAILY
Qty: 30 TAB | Refills: 0 | Status: SHIPPED | OUTPATIENT
Start: 2017-12-09 | End: 2018-03-08

## 2017-12-08 RX ORDER — SUCRALFATE 1 G/1
1 TABLET ORAL
Qty: 120 TAB | Refills: 0 | Status: SHIPPED | OUTPATIENT
Start: 2017-12-08 | End: 2019-03-05

## 2017-12-08 RX ORDER — METOPROLOL SUCCINATE 25 MG/1
25 TABLET, EXTENDED RELEASE ORAL DAILY
Qty: 30 TAB | Refills: 3 | Status: SHIPPED | OUTPATIENT
Start: 2017-12-09 | End: 2018-04-10 | Stop reason: SDUPTHER

## 2017-12-08 RX ORDER — ACETAMINOPHEN 325 MG/1
650 TABLET ORAL EVERY 4 HOURS PRN
Status: DISCONTINUED | OUTPATIENT
Start: 2017-12-08 | End: 2017-12-08 | Stop reason: HOSPADM

## 2017-12-08 RX ORDER — OMEPRAZOLE 20 MG/1
20 CAPSULE, DELAYED RELEASE ORAL
Qty: 30 CAP | Refills: 0 | Status: SHIPPED | OUTPATIENT
Start: 2017-12-09 | End: 2019-03-05

## 2017-12-08 RX ADMIN — APIXABAN 5 MG: 5 TABLET, FILM COATED ORAL at 09:37

## 2017-12-08 RX ADMIN — METOPROLOL SUCCINATE 25 MG: 25 TABLET, EXTENDED RELEASE ORAL at 09:37

## 2017-12-08 RX ADMIN — ACETAMINOPHEN 650 MG: 325 TABLET, FILM COATED ORAL at 09:37

## 2017-12-08 RX ADMIN — SERTRALINE 100 MG: 100 TABLET, FILM COATED ORAL at 09:37

## 2017-12-08 RX ADMIN — LEVOTHYROXINE SODIUM 100 MCG: 100 TABLET ORAL at 05:31

## 2017-12-08 RX ADMIN — COLCHICINE 0.6 MG: 0.6 TABLET, FILM COATED ORAL at 09:43

## 2017-12-08 RX ADMIN — SUCRALFATE 1 G: 1 TABLET ORAL at 09:37

## 2017-12-08 RX ADMIN — FLECAINIDE ACETATE 100 MG: 100 TABLET ORAL at 09:37

## 2017-12-08 RX ADMIN — SUCRALFATE 1 G: 1 TABLET ORAL at 05:31

## 2017-12-08 RX ADMIN — OMEPRAZOLE 20 MG: 20 CAPSULE, DELAYED RELEASE ORAL at 05:31

## 2017-12-08 ASSESSMENT — PAIN SCALES - GENERAL: PAINLEVEL_OUTOF10: 5

## 2017-12-08 NOTE — PROGRESS NOTES
Patient discharged with all belongings and prescriptions. RN reviewed discharge summary with pt and discussed medications and hospital stay, all questions answered. Patient's PIV removed. Patient left with her , via personal vehical.

## 2017-12-08 NOTE — PROGRESS NOTES
2 RN skin assessment completed with ROZ Busby    Ears red, blanching; foam ear pads placed.  Bilateral groin sites CDI, soft. All other skin intact, no further signs of skin breakdown.

## 2017-12-08 NOTE — DISCHARGE INSTRUCTIONS
Post ablation instructions: No lifting > 10 lbs x 1 week.  No baths or hot tubs x 1 week.  May shower tomorrow and take off dressings.  Continue to monitor sites daily for warmth, redness, discolored drainage   Please walk and take deep breaths after discharge.   After discharge if you experience chest pain, shortness of breath, hemoptysis, neurological changes, high fever 101 or greater, pre syncope/syncope, trouble with catheter sites needs to be seen in the emergency dept.   Recurrence of AF of 2 hours or greater please call our office.    Discharge Instructions    Discharged to home by car with relative. Discharged via walking, hospital escort: Yes.  Special equipment needed: Not Applicable    Be sure to schedule a follow-up appointment with your primary care doctor or any specialists as instructed.     Discharge Plan:   Diet Plan: Discussed  Activity Level: Discussed  Confirmed Follow up Appointment: Appointment Scheduled  Confirmed Symptoms Management: Discussed  Medication Reconciliation Updated: Yes  Influenza Vaccine Indication: Not indicated: Previously immunized this influenza season and > 8 years of age    I understand that a diet low in cholesterol, fat, and sodium is recommended for good health. Unless I have been given specific instructions below for another diet, I accept this instruction as my diet prescription.   Other diet: cardiac    Special Instructions: None    · Is patient discharged on Warfarin / Coumadin?   No     · Is patient Post Blood Transfusion?  No    Depression / Suicide Risk    As you are discharged from this [facility] facility, it is important to learn how to keep safe from harming yourself.    Recognize the warning signs:  · Abrupt changes in personality, positive or negative- including increase in energy   · Giving away possessions  · Change in eating patterns- significant weight changes-  positive or negative  · Change in sleeping patterns- unable to sleep or sleeping all the  time   · Unwillingness or inability to communicate  · Depression  · Unusual sadness, discouragement and loneliness  · Talk of wanting to die  · Neglect of personal appearance   · Rebelliousness- reckless behavior  · Withdrawal from people/activities they love  · Confusion- inability to concentrate     If you or a loved one observes any of these behaviors or has concerns about self-harm, here's what you can do:  · Talk about it- your feelings and reasons for harming yourself  · Remove any means that you might use to hurt yourself (examples: pills, rope, extension cords, firearm)  · Get professional help from the community (Mental Health, Substance Abuse, psychological counseling)  · Do not be alone:Call your Safe Contact- someone whom you trust who will be there for you.  · Call your local CRISIS HOTLINE 438-9152 or 390-487-4008  · Call your local Children's Mobile Crisis Response Team [place] (593) 811-9527 or www.[employer]  · Call the toll free National Suicide Prevention Hotlines   · National Suicide Prevention Lifeline 781-567-XHGD (4453)  · [employer] Mobile Hope Line Network 800-SUICIDE (550-5763)    Cardiac Ablation  Cardiac ablation is a procedure to disable a small amount of heart tissue in very specific places. The heart has many electrical connections. Sometimes these connections are abnormal and can cause the heart to beat very fast or irregularly. By disabling some of the problem areas, heart rhythm can be improved or made normal. Ablation is done for people who:   · Have Shirlene-Parkinson-White syndrome.    · Have other fast heart rhythms (tachycardia).    · Have taken medicines for an abnormal heart rhythm (arrhythmia) that resulted in:    ¨ No success.    ¨ Side effects.    · May have a high-risk heartbeat that could result in death.    LET YOUR HEALTH CARE PROVIDER KNOW ABOUT:   · Any allergies you have or any previous reactions you have had to X-ray dye, food (such as seafood), medicine, or tape.     · All medicines you are taking, including vitamins, herbs, eye drops, creams, and over-the-counter medicines.    · Previous problems you or members of your family have had with the use of anesthetics.    · Any blood disorders you have.    · Previous surgeries or procedures (such as a kidney transplant) you have had.    · Medical conditions you have (such as kidney failure).    RISKS AND COMPLICATIONS  Generally, cardiac ablation is a safe procedure. However, problems can occur and include:   · Increased risk of cancer. Depending on how long it takes to do the ablation, the dose of radiation can be high.   · Bruising and bleeding where a thin, flexible tube (catheter) was inserted during the procedure.    · Bleeding into the chest, especially into the sac that surrounds the heart (serious).  · Need for a permanent pacemaker if the normal electrical system is damaged.    · The procedure may not be fully effective, and this may not be recognized for months. Repeat ablation procedures are sometimes required.  BEFORE THE PROCEDURE   · Follow any instructions from your health care provider regarding eating and drinking before the procedure.    · Take your medicines as directed at regular times with water, unless instructed otherwise by your health care provider. If you are taking diabetes medicine, including insulin, ask how you are to take it and if there are any special instructions you should follow. It is common to adjust insulin dosing the day of the ablation.    PROCEDURE  · An ablation is usually performed in a catheterization laboratory with the guidance of fluoroscopy. Fluoroscopy is a type of X-ray that helps your health care provider see images of your heart during the procedure.    · An ablation is a minimally invasive procedure. This means a small cut (incision) is made in either your neck or groin. Your health care provider will decide where to make the incision based on your medical history and physical  "exam.  · An IV tube will be started before the procedure begins. You will be given an anesthetic or medicine to help you relax (sedative).  · The skin on your neck or groin will be numbed. A needle will be inserted into a large vein in your neck or groin and catheters will be threaded to your heart.  · A special dye that shows up on fluoroscopy pictures may be injected through the catheter. The dye helps your health care provider see the area of the heart that needs treatment.  · The catheter has electrodes on the tip. When the area of heart tissue that is causing the arrhythmia is found, the catheter tip will send an electrical current to the area and \"scar\" the tissue.  Three types of energy can be used to ablate the heart tissue:    ¨ Heat (radiofrequency energy).    ¨ Laser energy.    ¨ Extreme cold (cryoablation).    · When the area of the heart has been ablated, the catheter will be taken out. Pressure will be held on the insertion site. This will help the insertion site clot and keep it from bleeding. A bandage will be placed on the insertion site.    AFTER THE PROCEDURE   · After the procedure, you will be taken to a recovery area where your vital signs (blood pressure, heart rate, and breathing) will be monitored. The insertion site will also be monitored for bleeding.    · You will need to lie still for 4-6 hours. This is to ensure you do not bleed from the catheter insertion site.       This information is not intended to replace advice given to you by your health care provider. Make sure you discuss any questions you have with your health care provider.     Document Released: 05/06/2010 Document Revised: 01/08/2016 Document Reviewed: 05/12/2014  [employer] Interactive Patient Education ©2016 [employer]    Metoprolol extended-release tablets  What is this medicine?  METOPROLOL (me TOE proe lole) is a beta-blocker. Beta-blockers reduce the workload on the heart and help it to beat more regularly. This " medicine is used to treat high blood pressure and to prevent chest pain. It is also used to after a heart attack and to prevent an additional heart attack from occurring.  This medicine may be used for other purposes; ask your health care provider or pharmacist if you have questions.  COMMON BRAND NAME(S): Toprol MONICA  What should I tell my health care provider before I take this medicine?  They need to know if you have any of these conditions:  -diabetes  -heart or vessel disease like slow heart rate, worsening heart failure, heart block, sick sinus syndrome or Raynaud's disease  -kidney disease  -liver disease  -lung or breathing disease, like asthma or emphysema  -pheochromocytoma  -thyroid disease  -an unusual or allergic reaction to metoprolol, other beta-blockers, medicines, foods, dyes, or preservatives  -pregnant or trying to get pregnant  -breast-feeding  How should I use this medicine?  Take this medicine by mouth with a glass of water. Follow the directions on the prescription label. Do not crush or chew. Take this medicine with or immediately after meals. Take your doses at regular intervals. Do not take more medicine than directed. Do not stop taking this medicine suddenly. This could lead to serious heart-related effects.  Talk to your pediatrician regarding the use of this medicine in children. While this drug may be prescribed for children as young as 6 years for selected conditions, precautions do apply.  Overdosage: If you think you have taken too much of this medicine contact a poison control center or emergency room at once.  NOTE: This medicine is only for you. Do not share this medicine with others.  What if I miss a dose?  If you miss a dose, take it as soon as you can. If it is almost time for your next dose, take only that dose. Do not take double or extra doses.  What may interact with this medicine?  Do not take this medicine with any of the following medications:  -sotalol  This medicine  may also interact with the following medications:  -clonidine  -digoxin  -dobutamine  -epinephrine  -isoproterenol  -medicine to control heart rhythm like quinidine, propafenone  -medicine for depression like monoamine oxidase (MAO) inhibitors, fluoxetine, and paroxetine  -medicine for blood pressure like calcium channel blockers  -reserpine  This list may not describe all possible interactions. Give your health care provider a list of all the medicines, herbs, non-prescription drugs, or dietary supplements you use. Also tell them if you smoke, drink alcohol, or use illegal drugs. Some items may interact with your medicine.  What should I watch for while using this medicine?  Visit your doctor or health care professional for regular check ups. Contact your doctor right away if your symptoms worsen. Check your blood pressure and pulse rate regularly. Ask your health care professional what your blood pressure and pulse rate should be, and when you should contact them.  You may get drowsy or dizzy. Do not drive, use machinery, or do anything that needs mental alertness until you know how this medicine affects you. Do not sit or stand up quickly, especially if you are an older patient. This reduces the risk of dizzy or fainting spells. Contact your doctor if these symptoms continue. Alcohol may interfere with the effect of this medicine. Avoid alcoholic drinks.  What side effects may I notice from receiving this medicine?  Side effects that you should report to your doctor or health care professional as soon as possible:  -allergic reactions like skin rash, itching or hives  -cold or numb hands or feet  -depression  -difficulty breathing  -faint  -fever with sore throat  -irregular heartbeat, chest pain  -rapid weight gain  -swollen legs or ankles  Side effects that usually do not require medical attention (report to your doctor or health care professional if they continue or are bothersome):  -anxiety or  nervousness  -change in sex drive or performance  -dry skin  -headache  -nightmares or trouble sleeping  -short term memory loss  -stomach upset or diarrhea  -unusually tired  This list may not describe all possible side effects. Call your doctor for medical advice about side effects. You may report side effects to FDA at 3-374-CNJ-1572.  Where should I keep my medicine?  Keep out of the reach of children.  Store at room temperature between 15 and 30 degrees C (59 and 86 degrees F). Throw away any unused medicine after the expiration date.  NOTE: This sheet is a summary. It may not cover all possible information. If you have questions about this medicine, talk to your doctor, pharmacist, or health care provider.  © 2014, [employer]. (2/27/2009 5:08:10 PM)

## 2017-12-09 NOTE — DISCHARGE SUMMARY
DISCHARGE DIAGNOSES:  1.  Paroxysmal atrial fibrillation.  2.  Non-rheumatic aortic valve insufficiency.  3.  Obstructive sleep apnea.  4.  Obesity.  5.  Chronic anticoagulation.  6.  History of radiation therapy in 1990, mantle radiation.  7.  History of Hodgkin's disease.  8.  Hypothyroidism.  9.  History of breast cancer.    HISTORY OF PRESENT HOSPITALIZATION:  The patient is a 47-year-old female who   was seen in consultation by Dr. Urbano Ye actually has been followed   longitudinally by Dr. Urbano Ye, was last seen in our office on November 1st   with a history of recurrence of paroxysmal atrial fibrillation despite [employer]   utilization.  Cryo demonstrated greater than 30%, atrial fib burden.  Patient   therefore was started on flecainide.  My understanding is she had a normal   MPI and therefore was scheduled electively for atrial fib ablation.  The   patient was admitted on 12/07/2017 for that purpose.  Please see procedure   report.    COMPLICATIONS:  None.      CONCLUSIONS OF THE PROCEDURE WERE AS FOLLOWS:  1.  Paroxysmal atrial fibrillation.  2.  Successful RF ablation, pulmonary vein isolation procedure.      Recommendations were for anticoagulation, antiarrhythmics, beta blocker plus   flecainide, colchicine for 0.6 b.i.d. for 2 weeks and esophageal protection   for 1 month.    HOSPITAL COURSE:  The patient has done well through the night.  She has had no   recurrence of atrial fibrillation.  Her vital signs have been stable.  She   has some mild amount of a rawness in her chest, but minimal.  No pain   medication required for this.  She is afebrile at 36.7, blood pressure 119/63,   heart rate was 75.    LABORATORY DATA:  EKG this morning demonstrating sinus rhythm with a QTC of   440 msec with a slow R-wave progression.    DATABASE REVIEW:  H and H prior to admission 14.1 and 42.7.  Electrolytes:    Sodium 139, potassium 3.8, chloride 103, CO2 27, BUN 15, creatinine 0.73,   glucose was mildly  elevated at 105.  INR was 1.04.  TSH was 5.510, free T4   1.01.    DISCHARGE MEDICATIONS:  Will include:  1.  Eliquis 5 mg b.i.d.  2.  Colchicine 0.6 mg twice daily for 2 weeks.  3.  Flecainide 100 mg twice daily.  4.  Metoprolol SR 25 mg once daily.  5.  Synthroid 100 mcg daily.  6.  Omeprazole 20 mg daily.  7.  Carafate 1 g before meals and at bedtime for 1 month.  8.  Sertraline 100 mg every morning.      Groin site as well as right radial arterial site appeared uncomplicated, no   evidence of erythema, drainage or bruising.    IMPRESSIONS:  1.  Paroxysmal atrial fibrillation.  2.  Pulmonary vein isolation with Dr. Ye on 12/07/2017.  3.  History of non-Hodgkin's disease and mental cell radiation in 1990.  4.  Hypothyroidism.  5.  Non-rheumatic aortic valve insufficiency.  6.  Obstructive sleep apnea.  7.  History of breast carcinoma.    PLAN:  The patient will be discharged home.  She was given instructions in   regard to reporting to the emergency room for any of the following:    Temperature of 101 or greater, increasing chest pain, increasing shortness of   breath, focal neurological changes or hemoptysis.  Any problems with drainage   or changes at the catheter insertion site, she will report to the emergency   room.  Atrial fib, duration of greater than 2-hour she will notify our office.       ____________________________________     LAKIA Petersen / DOMINIQUE    DD:  12/08/2017 11:33:11  DT:  12/08/2017 16:23:30    D#:  [id number]  Job#:  [id number]

## 2017-12-10 ENCOUNTER — TELEPHONE (OUTPATIENT)
Dept: MEDICAL GROUP | Facility: PHYSICIAN GROUP | Age: 47
End: 2017-12-10

## 2017-12-10 RX ORDER — LEVOTHYROXINE SODIUM 112 UG/1
112 TABLET ORAL
Qty: 90 TAB | Refills: 3 | Status: SHIPPED | OUTPATIENT
Start: 2017-12-10 | End: 2018-11-14 | Stop reason: SDUPTHER

## 2017-12-11 ENCOUNTER — TELEPHONE (OUTPATIENT)
Dept: MEDICAL GROUP | Facility: PHYSICIAN GROUP | Age: 47
End: 2017-12-11

## 2017-12-11 NOTE — TELEPHONE ENCOUNTER
Phone Number Called: [phone] (home) [phone] (work)    Message: Unable to reach pt left vm to call back regarding labs     Left Message for patient to call back: yes

## 2017-12-11 NOTE — TELEPHONE ENCOUNTER
----- Message from Ruddy Mansfield M.D. sent at 12/11/2017 12:02 AM PST -----  Please let pt knows that thyroid test is slight elevated, meaning that she needs higher dose of thyroid medication. Please let her know that I send her new prescription for levothyroxine 112 mcg daily. Advise her to take it on empty stomach. Please let me know if she has questions. I will discuss further about repeating lab work at her next apt   Thank you,  Ruddy Mansfield M.D.

## 2017-12-11 NOTE — TELEPHONE ENCOUNTER
Phone Number Called: [phone] (home) [phone] (work)    Message: Pt notified of results below.     Left Message for patient to call back: N\A

## 2017-12-22 ENCOUNTER — APPOINTMENT (OUTPATIENT)
Dept: DERMATOLOGY | Facility: IMAGING CENTER | Age: 47
End: 2017-12-22

## 2017-12-22 ENCOUNTER — OFFICE VISIT (OUTPATIENT)
Dept: MEDICAL GROUP | Facility: PHYSICIAN GROUP | Age: 47
End: 2017-12-22
Payer: COMMERCIAL

## 2017-12-22 VITALS
HEART RATE: 83 BPM | DIASTOLIC BLOOD PRESSURE: 82 MMHG | SYSTOLIC BLOOD PRESSURE: 134 MMHG | RESPIRATION RATE: 14 BRPM | WEIGHT: 205 LBS | BODY MASS INDEX: 32.18 KG/M2 | TEMPERATURE: 98.5 F | HEIGHT: 67 IN | OXYGEN SATURATION: 95 %

## 2017-12-22 DIAGNOSIS — E66.9 OBESITY (BMI 30-39.9): ICD-10-CM

## 2017-12-22 DIAGNOSIS — I48.0 PAROXYSMAL ATRIAL FIBRILLATION (HCC): ICD-10-CM

## 2017-12-22 DIAGNOSIS — G47.33 OSA (OBSTRUCTIVE SLEEP APNEA): Chronic | ICD-10-CM

## 2017-12-22 DIAGNOSIS — E03.9 HYPOTHYROIDISM, UNSPECIFIED TYPE: ICD-10-CM

## 2017-12-22 DIAGNOSIS — Z79.01 CHRONIC ANTICOAGULATION: ICD-10-CM

## 2017-12-22 DIAGNOSIS — E78.00 ELEVATED LDL CHOLESTEROL LEVEL: ICD-10-CM

## 2017-12-22 DIAGNOSIS — R59.0 LYMPHADENOPATHY, AXILLARY: ICD-10-CM

## 2017-12-22 PROBLEM — D22.9 ATYPICAL NEVUS: Status: RESOLVED | Noted: 2017-09-05 | Resolved: 2017-12-22

## 2017-12-22 PROCEDURE — 99214 OFFICE O/P EST MOD 30 MIN: CPT | Performed by: INTERNAL MEDICINE

## 2017-12-22 ASSESSMENT — PATIENT HEALTH QUESTIONNAIRE - PHQ9: CLINICAL INTERPRETATION OF PHQ2 SCORE: 0

## 2017-12-22 NOTE — ASSESSMENT & PLAN NOTE
Persistent, chronic. She had ablation recently. She is doing better. She is on flecainide. She denies shortness of breath, swelling, palpitations, palpitations , orthopnea . She is on xarelto for anticoagulation

## 2017-12-26 ENCOUNTER — OFFICE VISIT (OUTPATIENT)
Dept: CARDIOLOGY | Facility: MEDICAL CENTER | Age: 47
End: 2017-12-26
Payer: COMMERCIAL

## 2017-12-26 VITALS
OXYGEN SATURATION: 93 % | HEART RATE: 88 BPM | BODY MASS INDEX: 33.11 KG/M2 | HEIGHT: 66 IN | WEIGHT: 206 LBS | SYSTOLIC BLOOD PRESSURE: 132 MMHG | DIASTOLIC BLOOD PRESSURE: 72 MMHG

## 2017-12-26 DIAGNOSIS — I48.91 ATRIAL FIBRILLATION, UNSPECIFIED TYPE (HCC): ICD-10-CM

## 2017-12-26 DIAGNOSIS — Z86.79 S/P ABLATION OF ATRIAL FIBRILLATION: ICD-10-CM

## 2017-12-26 DIAGNOSIS — Z79.01 CHRONIC ANTICOAGULATION: ICD-10-CM

## 2017-12-26 DIAGNOSIS — Z98.890 S/P ABLATION OF ATRIAL FIBRILLATION: ICD-10-CM

## 2017-12-26 DIAGNOSIS — I35.1 NONRHEUMATIC AORTIC VALVE INSUFFICIENCY: ICD-10-CM

## 2017-12-26 DIAGNOSIS — I48.0 PAROXYSMAL ATRIAL FIBRILLATION (HCC): ICD-10-CM

## 2017-12-26 PROCEDURE — 99214 OFFICE O/P EST MOD 30 MIN: CPT | Performed by: NURSE PRACTITIONER

## 2017-12-26 PROCEDURE — 93000 ELECTROCARDIOGRAM COMPLETE: CPT | Performed by: NURSE PRACTITIONER

## 2017-12-26 ASSESSMENT — ENCOUNTER SYMPTOMS
PND: 0
SPEECH CHANGE: 0
COUGH: 0
HEARTBURN: 0
BLOOD IN STOOL: 0
WHEEZING: 0
DOUBLE VISION: 0
NAUSEA: 0
SORE THROAT: 0
VOMITING: 0
FEVER: 0
ORTHOPNEA: 0
HEADACHES: 0
BLURRED VISION: 0
PSYCHIATRIC NEGATIVE: 1
ABDOMINAL PAIN: 0
SHORTNESS OF BREATH: 0
CHILLS: 0
MYALGIAS: 0
PALPITATIONS: 1
FOCAL WEAKNESS: 0
DIZZINESS: 0
LOSS OF CONSCIOUSNESS: 0
BRUISES/BLEEDS EASILY: 0
CLAUDICATION: 0

## 2017-12-26 NOTE — ASSESSMENT & PLAN NOTE
She has noticed bulging sensation on both axillary area for one month. There is no nodule. No trauma, unintentional weight loss, night sweats. There is no fever, traveling. No infection. She has history of radiation . History of breast cancer. Mammogram normal 06/2017

## 2017-12-26 NOTE — LETTER
[facility]   [address]  [place] [address]  Phone: [phone]  Fax: [phone]              Nikki Bree Aponte  1970    Encounter Date: 12/26/2017    HARRIET Fallon          PROGRESS NOTE:  No notes on file      Ruddy Mansfiled M.D.  [address]  [facility] [address]  [place] [address]  VIA In Basket

## 2017-12-26 NOTE — ASSESSMENT & PLAN NOTE
She just started to take higher dose of levothyroxine. We will continue to monitor. She denies constipation, brittle nail, hair loss, dry skin

## 2017-12-26 NOTE — PROGRESS NOTES
Subjective:   Nikki Aponte is a 47 y.o. female here today for atrial fibrillation, axillary bulging     Elevated LDL cholesterol level  ASCVD score 1.6 %     Paroxysmal atrial fibrillation (CMS-HCC)  Persistent, chronic. She had ablation recently. She is doing better. She is on flecainide. She denies shortness of breath, swelling, palpitations, palpitations , orthopnea . She is on xarelto for anticoagulation    TONY (obstructive sleep apnea)  She has upcoming studies soon     Obesity (BMI 30-39.9)  Counseling for a small portion, balanced diet, exercising for3-5 times per week.      Lymphadenopathy, axillary  She has noticed bulging sensation on both axillary area for one month. There is no nodule. No trauma, unintentional weight loss, night sweats. There is no fever, traveling. No infection. She has history of radiation . History of breast cancer. Mammogram normal 06/2017    Chronic anticoagulation  For a-fib. Continue xarelto     Hypothyroidism  She just started to take higher dose of levothyroxine. We will continue to monitor. She denies constipation, brittle nail, hair loss, dry skin      Current medicines (including changes today)  Current Outpatient Prescriptions   Medication Sig Dispense Refill   • levothyroxine (SYNTHROID) 112 MCG Tab Take 1 Tab by mouth Every morning on an empty stomach. 90 Tab 3   • metoprolol SR (TOPROL XL) 25 MG TABLET SR 24 HR Take 1 Tab by mouth every day. 30 Tab 3   • colchicine (COLCRYS) 0.6 MG Tab Take 1 Tab by mouth every day. 30 Tab 0   • omeprazole (PRILOSEC) 20 MG delayed-release capsule Take 1 Cap by mouth every morning before breakfast. 30 Cap 0   • sucralfate (CARAFATE) 1 GM Tab Take 1 Tab by mouth 4 Times a Day,Before Meals and at Bedtime. 120 Tab 0   • diphenhydrAMINE (BENADRYL) 25 MG Tab Take 25 mg by mouth as needed for Sleep.     • flecainide (TAMBOCOR) 100 MG Tab Take 1 Tab by mouth 2 times a day. 60 Tab 3   • ELIQUIS 5 MG Tab TAKE 1 TAB BY MOUTH 2 TIMES A DAY. 60 Tab  3   • albuterol (VENTOLIN HFA) 108 (90 BASE) MCG/ACT Aero Soln inhalation aerosol Inhale 2 Puffs by mouth every four hours as needed for Shortness of Breath.     • PARAGARD INTRAUTERINE COPPER IUD by Intrauterine route.     • sertraline (ZOLOFT) 100 MG Tab Take 1 Tab by mouth every morning. 90 Tab 3     No current facility-administered medications for this visit.      She  has a past medical history of Allergic rhinitis (4/4/2013); Aortic sclerosis (5/8/2014); Arrhythmia; Backpain; Breast cancer, right (CMS-HCC) (4/2016); Breath shortness; Bronchitis (2012); Cancer (CMS-HCC) (1990); Cancer (CMS-HCC) (2016); DCIS (ductal carcinoma in situ) (2015); Depression (4/4/2013); Endometriosis; Heart murmur; Hemorrhagic disorder (CMS-HCC); Hodgkin disease (CMS-HCC); Hypothyroidism (4/4/2013); Migraine variant (4/4/2013); Paroxysmal atrial fibrillation (CMS-HCC); Psychiatric problem; Sleep apnea; and Valvular heart disease.    Current Outpatient Prescriptions   Medication Sig Dispense Refill   • levothyroxine (SYNTHROID) 112 MCG Tab Take 1 Tab by mouth Every morning on an empty stomach. 90 Tab 3   • metoprolol SR (TOPROL XL) 25 MG TABLET SR 24 HR Take 1 Tab by mouth every day. 30 Tab 3   • colchicine (COLCRYS) 0.6 MG Tab Take 1 Tab by mouth every day. 30 Tab 0   • omeprazole (PRILOSEC) 20 MG delayed-release capsule Take 1 Cap by mouth every morning before breakfast. 30 Cap 0   • sucralfate (CARAFATE) 1 GM Tab Take 1 Tab by mouth 4 Times a Day,Before Meals and at Bedtime. 120 Tab 0   • diphenhydrAMINE (BENADRYL) 25 MG Tab Take 25 mg by mouth as needed for Sleep.     • flecainide (TAMBOCOR) 100 MG Tab Take 1 Tab by mouth 2 times a day. 60 Tab 3   • ELIQUIS 5 MG Tab TAKE 1 TAB BY MOUTH 2 TIMES A DAY. 60 Tab 3   • albuterol (VENTOLIN HFA) 108 (90 BASE) MCG/ACT Aero Soln inhalation aerosol Inhale 2 Puffs by mouth every four hours as needed for Shortness of Breath.     • PARAGARD INTRAUTERINE COPPER IUD by Intrauterine route.     •  sertraline (ZOLOFT) 100 MG Tab Take 1 Tab by mouth every morning. 90 Tab 3     No current facility-administered medications for this visit.        Allergies as of 12/22/2017 - Reviewed 12/22/2017   Allergen Reaction Noted   • Codeine Itching 08/01/2017   • Penicillins  12/01/2014       Social History     Social History   • Marital status: Single     Spouse name: N/A   • Number of children: N/A   • Years of education: N/A     Occupational History   • Not on file.     Social History Main Topics   • Smoking status: Former Smoker     Packs/day: 0.25     Years: 1.00     Quit date: 3/29/1991   • Smokeless tobacco: Never Used   • Alcohol use 0.0 oz/week      Comment: 1 per month   • Drug use: No   • Sexual activity: Yes     Birth control/ protection: IUD     Other Topics Concern   • Not on file     Social History Narrative   • No narrative on file        Family History   Problem Relation Age of Onset   • Stroke Mother    • Hypertension Mother    • Stroke Father    • Hypertension Father    • Cancer Father      prostate cancer   • Diabetes Neg Hx        Past Surgical History:   Procedure Laterality Date   • CARPAL TUNNEL RELEASE Right 4/21/2017    Procedure: CARPAL TUNNEL RELEASE;  Surgeon: Shay Powers M.D.;  Location: SURGERY [facility];  Service:    • CARPAL TUNNEL RELEASE Left 3/31/2017    Procedure: CARPAL TUNNEL RELEASE;  Surgeon: Shay Powers M.D.;  Location: SURGERY [facility];  Service:    • BREAST BIOPSY Right 5/25/2016    Procedure: BREAST BIOPSY FOR: RE-EXCISION OF RT BREAST TISSUE ;  Surgeon: Patricia Romero M.D.;  Location: SURGERY SAME DAY [facility];  Service:    • PARTIAL MASTECTOMY Right 5/10/2016    Procedure: PARTIAL MASTECTOMY;  Surgeon: Patricia Romero M.D.;  Location: SURGERY SAME DAY [facility];  Service:    • US-NEEDLE CORE BX-BREAST PANEL         ROS   All systems reviewed are negative except for HPI       Objective:     Blood pressure 134/82, pulse 83, temperature 36.9 °C  "(98.5 °F), resp. rate 14, height 1.702 m (5' 7\"), weight 93 kg (205 lb), last menstrual period 11/17/2017, SpO2 95 %, not currently breastfeeding. Body mass index is 32.11 kg/m².   Physical Exam:  Constitutional: Alert, no distress.  Skin: Warm, dry, good turgor, no rashes in visible areas.  Eye: Equal, round and reactive, conjunctiva clear, lids normal.  ENMT: Lips without lesions, good dentition, oropharynx clear.  Neck: Trachea midline, no masses, no thyromegaly. No cervical or supraclavicular lymphadenopathy, bulging on both axillary area, no nodule,   Respiratory: Unlabored respiratory effort, lungs clear to auscultation, no wheezes, no ronchi.  Cardiovascular: Normal S1, S2, no murmur, no edema.  Abdomen: Soft, non-tender, no masses, no hepatosplenomegaly.  Psych: Alert and oriented x3, normal affect and mood.        Assessment and Plan:   The following treatment plan was discussed    1. TONY (obstructive sleep apnea)  Follow up with P[facility]   - COMP METABOLIC PANEL; Future  - CBC WITH DIFFERENTIAL; Future    2. Obesity (BMI 30-39.9)  Counseling for a small portion, balanced diet, exercising for3-5 times per week.    - COMP METABOLIC PANEL; Future  - CBC WITH DIFFERENTIAL; Future    3. Paroxysmal atrial fibrillation (CMS-HCC)  Follow up with cards. Continue same treatment   - COMP METABOLIC PANEL; Future  - CBC WITH DIFFERENTIAL; Future    4. Chronic anticoagulation  Continue same treatment   - COMP METABOLIC PANEL; Future  - CBC WITH DIFFERENTIAL; Future    5. Hypothyroidism, unspecified type  Continue same treatment. Continue to monitor   - COMP METABOLIC PANEL; Future  - CBC WITH DIFFERENTIAL; Future  - TSH WITH REFLEX TO FT4; Future    6. Elevated LDL cholesterol level  Continue to monitor, low fat diet   - COMP METABOLIC PANEL; Future  - CBC WITH DIFFERENTIAL; Future  - LIPID PROFILE; Future    7. Lymphadenopathy, axillary  Continue to monitor seems swelling of lymphatic tissue. No B signs. Consider US at next " apt       Followup: Return in about 6 months (around 6/22/2018), or if symptoms worsen or fail to improve, for medication management, follow up on lab review.

## 2017-12-27 ENCOUNTER — SLEEP CENTER VISIT (OUTPATIENT)
Dept: SLEEP MEDICINE | Facility: MEDICAL CENTER | Age: 47
End: 2017-12-27
Payer: COMMERCIAL

## 2017-12-27 VITALS
HEART RATE: 84 BPM | WEIGHT: 204 LBS | BODY MASS INDEX: 32.78 KG/M2 | HEIGHT: 66 IN | TEMPERATURE: 97.7 F | OXYGEN SATURATION: 93 % | SYSTOLIC BLOOD PRESSURE: 112 MMHG | DIASTOLIC BLOOD PRESSURE: 80 MMHG | RESPIRATION RATE: 16 BRPM

## 2017-12-27 DIAGNOSIS — E66.9 OBESITY (BMI 30-39.9): ICD-10-CM

## 2017-12-27 DIAGNOSIS — G47.33 OSA (OBSTRUCTIVE SLEEP APNEA): Chronic | ICD-10-CM

## 2017-12-27 LAB — EKG IMPRESSION: NORMAL

## 2017-12-27 PROCEDURE — 99213 OFFICE O/P EST LOW 20 MIN: CPT | Performed by: NURSE PRACTITIONER

## 2017-12-27 NOTE — PATIENT INSTRUCTIONS
1) Continue autoCPAP at [id number]  2) Clean mask and supplies weekly and change them as insurance allows  4) Vaccines: Up to date with   5) Return in about 3 months (around 3/27/2018) for Compliance, review of symptoms, if not sooner, follow up with ANNABEL Agudelo.

## 2017-12-27 NOTE — PROGRESS NOTES
"Subjective:   Nikki Aponte is a 47 y.o. female who presents today for review of her cardiac rhythm status after PVI with Dr Ye on 12/7/17.  HISTORY OF PRESENT HOSPITALIZATION:  The patient is a 47-year-old female who was seen in consultation by Dr. Urbano Ye actually has been followed longitudinally by Dr. Urbano Ye, was last seen in our office on November 1st with a history of recurrence of paroxysmal atrial fibrillation despite Multaq   utilization. Zio demonstrated greater than 30%, atrial fib burden.  Patient therefore was started on flecainide.  My understanding is she had a normal MPI and therefore was scheduled electively for atrial fib ablation.  The   patient was admitted on 12/07/2017 for that purpose.  Please see procedure report.  She is feeling well post procedure. She is not sure about recurrent arrhythmias maybe a minute or two at most.  She has more energy and is feeling much better.  She denies increasing chest pain has had some chest pressure that has gotten better since procedure. No temperatures. No SOB. Catheter insertion sites are uncomplicated.  Past Medical History:   Diagnosis Date   • Allergic rhinitis 4/4/2013   • Aortic sclerosis 5/8/2014   • Arrhythmia     \"Skips a beat\"   • Backpain    • Breast cancer, right (CMS-HCC) 4/2016   • Breath shortness     \"I have scar tissue in right lung from previous radiation, use my inhaler about once a week\"   • Bronchitis 2012   • Cancer (CMS-[facility]) 1990    hodgkins, previous chemo, radiation    • Cancer (CMS-[facility]) 2016    DCIS right breast lumpectomy radiation   • DCIS (ductal carcinoma in situ) 2015   • Depression 4/4/2013   • Endometriosis    • Heart murmur    • Hemorrhagic disorder (CMS-[facility])     on Eliquis   • Hodgkin disease (CMS-[facility])    • Hypothyroidism 4/4/2013   • Migraine variant 4/4/2013   • Paroxysmal atrial fibrillation (CMS-[facility])    • Psychiatric problem    • Sleep apnea     CPAP   • Valvular heart disease     moderate AI      Past " Surgical History:   Procedure Laterality Date   • CARPAL TUNNEL RELEASE Right 4/21/2017    Procedure: CARPAL TUNNEL RELEASE;  Surgeon: Shay Powers M.D.;  Location: SURGERY [facility];  Service:    • CARPAL TUNNEL RELEASE Left 3/31/2017    Procedure: CARPAL TUNNEL RELEASE;  Surgeon: Shay Powers M.D.;  Location: SURGERY [facility];  Service:    • BREAST BIOPSY Right 5/25/2016    Procedure: BREAST BIOPSY FOR: RE-EXCISION OF RT BREAST TISSUE ;  Surgeon: Patricia Romero M.D.;  Location: SURGERY SAME DAY [facility];  Service:    • PARTIAL MASTECTOMY Right 5/10/2016    Procedure: PARTIAL MASTECTOMY;  Surgeon: Patricia Romero M.D.;  Location: SURGERY SAME DAY [facility];  Service:    • US-NEEDLE CORE BX-BREAST PANEL       Family History   Problem Relation Age of Onset   • Stroke Mother    • Hypertension Mother    • Stroke Father    • Hypertension Father    • Cancer Father      prostate cancer   • Diabetes Neg Hx      History   Smoking Status   • Former Smoker   • Packs/day: 0.25   • Years: 1.00   • Quit date: 3/29/1991   Smokeless Tobacco   • Never Used     Allergies   Allergen Reactions   • Codeine Itching   • Penicillins      States that it does not treat anything when she has been on it.      Outpatient Encounter Prescriptions as of 12/26/2017   Medication Sig Dispense Refill   • levothyroxine (SYNTHROID) 112 MCG Tab Take 1 Tab by mouth Every morning on an empty stomach. 90 Tab 3   • metoprolol SR (TOPROL XL) 25 MG TABLET SR 24 HR Take 1 Tab by mouth every day. 30 Tab 3   • colchicine (COLCRYS) 0.6 MG Tab Take 1 Tab by mouth every day. 30 Tab 0   • omeprazole (PRILOSEC) 20 MG delayed-release capsule Take 1 Cap by mouth every morning before breakfast. 30 Cap 0   • sucralfate (CARAFATE) 1 GM Tab Take 1 Tab by mouth 4 Times a Day,Before Meals and at Bedtime. 120 Tab 0   • diphenhydrAMINE (BENADRYL) 25 MG Tab Take 25 mg by mouth as needed for Sleep.     • flecainide (TAMBOCOR) 100 MG Tab Take 1 Tab  "by mouth 2 times a day. 60 Tab 3   • ELIQUIS 5 MG Tab TAKE 1 TAB BY MOUTH 2 TIMES A DAY. 60 Tab 3   • albuterol (VENTOLIN HFA) 108 (90 BASE) MCG/ACT Aero Soln inhalation aerosol Inhale 2 Puffs by mouth every four hours as needed for Shortness of Breath.     • PARAGARD INTRAUTERINE COPPER IUD by Intrauterine route.     • sertraline (ZOLOFT) 100 MG Tab Take 1 Tab by mouth every morning. 90 Tab 3     No facility-administered encounter medications on file as of 12/26/2017.      Review of Systems   Constitutional: Positive for malaise/fatigue. Negative for chills and fever.   HENT: Negative for sore throat.         No difficulty swallowing   Eyes: Negative for blurred vision and double vision.   Respiratory: Negative for cough, shortness of breath and wheezing.    Cardiovascular: Positive for palpitations. Negative for chest pain, orthopnea, claudication, leg swelling and PND.   Gastrointestinal: Negative for abdominal pain, blood in stool, heartburn, nausea and vomiting.   Genitourinary: Negative for dysuria, frequency, hematuria and urgency.   Musculoskeletal: Negative for myalgias.   Skin: Negative.    Neurological: Negative for dizziness, speech change, focal weakness, loss of consciousness and headaches.   Endo/Heme/Allergies: Does not bruise/bleed easily.   Psychiatric/Behavioral: Negative.         Objective:   /72   Pulse 88   Ht 1.676 m (5' 6\")   Wt 93.4 kg (206 lb)   LMP 12/01/2017   SpO2 93%   BMI 33.25 kg/m²     Physical Exam   Constitutional: She is oriented to person, place, and time. She appears well-developed and well-nourished.   HENT:   Head: Normocephalic and atraumatic.   Eyes: Pupils are equal, round, and reactive to light.   Neck: Normal range of motion. Neck supple. No thyromegaly present.   Cardiovascular: Normal rate, regular rhythm and intact distal pulses.  Exam reveals no gallop and no friction rub.    Murmur heard.  Pulmonary/Chest: Effort normal and breath sounds normal. No " respiratory distress. She has no wheezes. She has no rales. She exhibits no tenderness.   Abdominal: Soft. Bowel sounds are normal. She exhibits no distension. There is no tenderness. There is no guarding.   Musculoskeletal: Normal range of motion. She exhibits no edema.   Neurological: She is alert and oriented to person, place, and time.   Skin: Skin is warm and dry.   Psychiatric: She has a normal mood and affect.   EKG SR    Assessment:     1. Atrial fibrillation, unspecified type (CMS-HCC)  EKG   2. Paroxysmal atrial fibrillation (CMS-HCC)     3. Chronic anticoagulation     4. S/P ablation of atrial fibrillation     5. Nonrheumatic aortic valve insufficiency         Medical Decision Making:  Today's Assessment / Status / Plan:     1.AF doing well post ablation 12/7/17.  2. PAF no evidence of significant recurrence since ablation on Flecainide and Metop.  3. AC on Eliquis  4. AI appears stable.  Reminded of following:  Post ablation instructions:   Continue to monitor sites daily for warmth, redness, discolored drainage  Please walk and take deep breaths after discharge.  After discharge if you experience chest pain, shortness of breath, hemoptysis, neurological changes, high fever 101 or greater, pre syncope/syncope, trouble with catheter sites needs to be seen in the emergency dept.   RTC 1 month to see Dr Ye    Collaborating MD JONEL Bauman

## 2017-12-27 NOTE — PROGRESS NOTES
CC:  Here for f/u sleep and pulmonary issues as listed below    HPI:   Nikki presents today for follow up for TONY and asthma.  Recently had ablation with benefit 3 weeks ago for hx of atrial fibrillation. PFTs from 3/2017 indicate a Fev1 of 1.92L or 64% predicted, Fev1/FVC ratio of 72, DLCO 121%. She has not used rescue inhaler in many months.      She was originally diagnosed in 2015 with mild TONY with an AHI of 20.4 and low oxygenation of 75% she did not pursue treatment at that time and attempted to work on weight loss, although she has increased weight since that time. Given ongoing symptoms she completed PSG 10/2017 which indicated an AHI of 15.1 that increases to 110 while in REM with low oxygenation of 68%.       Currently she is being treated with autoCPAP @ [id number].  Compliance download from the dates 11/27/2017 - 12/26/2017 indicates she is wearing the device 93.3% for an avg of 4 hours and 49 minutes per night with a reduced AHI of 3.5.  She does tolerate pressure and mask well.  She recently had laser on her face and caused a reaction where her mask is. She has been wearing a cloth under the mask with benefit. She wakes up refreshed and is less tired throughout the day. She denies morning H/A and sleep better overall. She will continue to clean supplies weekly and change them as insurance allows.       Patient Active Problem List    Diagnosis Date Noted   • TONY (obstructive sleep apnea)      Priority: Medium   • Obesity (BMI 30-39.9) 05/04/2017     Priority: Medium   • Paroxysmal atrial fibrillation (CMS-HCC) 12/22/2016     Priority: Medium   • Nonrheumatic aortic valve insufficiency 05/08/2014     Priority: Medium   • History of breast cancer 05/17/2017     Priority: Low   • S/P radiation therapy - 1990 [facility]      Priority: Low   • Allergic rhinitis due to allergen 05/08/2014     Priority: Low   • History of Hodgkin's disease 12/30/2013     Priority: Low   • Hypothyroidism 04/04/2013      "Priority: Low   • Depression 04/04/2013     Priority: Low   • S/P ablation of atrial fibrillation 12/26/2017   • Elevated LDL cholesterol level 12/22/2017   • Lymphadenopathy, axillary 12/22/2017   • Chronic anticoagulation 08/04/2017       Past Medical History:   Diagnosis Date   • Allergic rhinitis 4/4/2013   • Aortic sclerosis 5/8/2014   • Arrhythmia     \"Skips a beat\"   • Backpain    • Breast cancer, right (CMS-HCC) 4/2016   • Breath shortness     \"I have scar tissue in right lung from previous radiation, use my inhaler about once a week\"   • Bronchitis 2012   • Cancer (CMS-HCC) 1990    hodgkins, previous chemo, radiation    • Cancer (CMS-HCC) 2016    DCIS right breast lumpectomy radiation   • DCIS (ductal carcinoma in situ) 2015   • Depression 4/4/2013   • Endometriosis    • Heart murmur    • Hemorrhagic disorder (CMS-HCC)     on Eliquis   • Hodgkin disease (CMS-HCC)    • Hypothyroidism 4/4/2013   • Migraine variant 4/4/2013   • Paroxysmal atrial fibrillation (CMS-HCC)    • Psychiatric problem    • Sleep apnea     CPAP   • Valvular heart disease     moderate AI        Past Surgical History:   Procedure Laterality Date   • CARPAL TUNNEL RELEASE Right 4/21/2017    Procedure: CARPAL TUNNEL RELEASE;  Surgeon: Shay Powers M.D.;  Location: SURGERY [facility];  Service:    • CARPAL TUNNEL RELEASE Left 3/31/2017    Procedure: CARPAL TUNNEL RELEASE;  Surgeon: Shay Powers M.D.;  Location: SURGERY [facility];  Service:    • BREAST BIOPSY Right 5/25/2016    Procedure: BREAST BIOPSY FOR: RE-EXCISION OF RT BREAST TISSUE ;  Surgeon: Patricia Romero M.D.;  Location: SURGERY SAME DAY [facility];  Service:    • PARTIAL MASTECTOMY Right 5/10/2016    Procedure: PARTIAL MASTECTOMY;  Surgeon: Patricia Romero M.D.;  Location: SURGERY SAME DAY [facility];  Service:    • US-NEEDLE CORE BX-BREAST PANEL         Family History   Problem Relation Age of Onset   • Stroke Mother    • Hypertension Mother    • Stroke " Father    • Hypertension Father    • Cancer Father      prostate cancer   • Diabetes Neg Hx        Social History     Social History   • Marital status: Single     Spouse name: N/A   • Number of children: N/A   • Years of education: N/A     Occupational History   • Not on file.     Social History Main Topics   • Smoking status: Former Smoker     Packs/day: 0.25     Years: 1.00     Types: Cigarettes     Quit date: 3/29/1991   • Smokeless tobacco: Never Used   • Alcohol use No      Comment: 1 per month   • Drug use: No   • Sexual activity: Yes     Birth control/ protection: IUD     Other Topics Concern   • Not on file     Social History Narrative   • No narrative on file       Current Outpatient Prescriptions   Medication Sig Dispense Refill   • levothyroxine (SYNTHROID) 112 MCG Tab Take 1 Tab by mouth Every morning on an empty stomach. 90 Tab 3   • metoprolol SR (TOPROL XL) 25 MG TABLET SR 24 HR Take 1 Tab by mouth every day. 30 Tab 3   • colchicine (COLCRYS) 0.6 MG Tab Take 1 Tab by mouth every day. 30 Tab 0   • omeprazole (PRILOSEC) 20 MG delayed-release capsule Take 1 Cap by mouth every morning before breakfast. 30 Cap 0   • sucralfate (CARAFATE) 1 GM Tab Take 1 Tab by mouth 4 Times a Day,Before Meals and at Bedtime. 120 Tab 0   • diphenhydrAMINE (BENADRYL) 25 MG Tab Take 25 mg by mouth as needed for Sleep.     • flecainide (TAMBOCOR) 100 MG Tab Take 1 Tab by mouth 2 times a day. 60 Tab 3   • ELIQUIS 5 MG Tab TAKE 1 TAB BY MOUTH 2 TIMES A DAY. 60 Tab 3   • albuterol (VENTOLIN HFA) 108 (90 BASE) MCG/ACT Aero Soln inhalation aerosol Inhale 2 Puffs by mouth every four hours as needed for Shortness of Breath.     • PARAGARD INTRAUTERINE COPPER IUD by Intrauterine route.     • sertraline (ZOLOFT) 100 MG Tab Take 1 Tab by mouth every morning. 90 Tab 3     No current facility-administered medications for this visit.           Allergies: Codeine and Penicillins      ROS   Gen: Denies fever, chills, unintentional weight  "loss, fatigue, night sweats  E/N/T: Denies ear pain, nasal congestion  Resp:Denies Dyspnea, wheezing, cough, sputum production, hemoptysis  CV: Denies chest pain, chest tightness, palpitations, BLE edema  Sleep:Denies morning headache, insomnia, daytime somnolence, snoring, gasping for air, apnea  Neuro: Denies frequent headaches, weakness, dizziness  GI: Denies N/V, acid reflux/heartburn  See HPI.  All other systems reviewed and negative      Vital signs for this encounter:  Vitals:    12/27/17 1426   Height: 1.676 m (5' 6\")   Weight: 92.5 kg (204 lb)   Weight % change since last entry.: 0 %   BP: 112/80   Pulse: 84   BMI (Calculated): 32.93   Resp: 16   Temp: 36.5 °C (97.7 °F)   O2 sat % room air: 93 %                 Physical Exam:   Appearance: well developed, well nourished, no acute distress.  Eyes: PERRL, EOM intact, sclere white, conjunctiva moist.  Ears: no lesions or deformities.  Hearing: grossly intact.  Nose: no lesions or deformities.  Dentition: good dentition.  Oropharynx: tongue normal, posterior pharynx without erythema or exudate.  Neck: supple, trachea midline, no masses.  Respiratory effort: no intercostal retractions or use of accessory muscles.  Lung auscultation: Bilateral diminished   Heart auscultation: no murmur, rub, or gallop.   Extremities: no cyanosis or edema.  Abdomen: soft, non-tender, no masses.  Gait and station: grossly normal   Digits and Nails: no clubbing, cyanosis, petechiae, or nodes.  Cranial nerves: grossly normal.  Motor: no focal deficits observed.  Skin: no rashes, lesions, or ulcers noted.  Orientation: oriented to time, place, and person.  Mood and affect: mood and affect appropriate, normal interaction with examiner.    Assessment   1. TONY (obstructive sleep apnea)     2. Obesity (BMI 30-39.9)         Patient was seen for 25 minutes, more than 50% of time spent in face to face review, counseling, and arranging future evaluation and follow up of medical conditions and " care.     PLAN:   Patient Instructions   1) Continue autoCPAP at [id number]  2) Clean mask and supplies weekly and change them as insurance allows  4) Vaccines: Up to date with flu  5) Return in about 3 months (around 3/27/2018) for Compliance, review of symptoms, if not sooner, follow up with ANNABEL Agudelo.

## 2017-12-29 ENCOUNTER — OFFICE VISIT (OUTPATIENT)
Dept: MEDICAL GROUP | Facility: PHYSICIAN GROUP | Age: 47
End: 2017-12-29
Payer: COMMERCIAL

## 2017-12-29 VITALS
OXYGEN SATURATION: 94 % | HEART RATE: 87 BPM | DIASTOLIC BLOOD PRESSURE: 64 MMHG | RESPIRATION RATE: 16 BRPM | BODY MASS INDEX: 31.86 KG/M2 | WEIGHT: 203 LBS | HEIGHT: 67 IN | TEMPERATURE: 98.1 F | SYSTOLIC BLOOD PRESSURE: 112 MMHG

## 2017-12-29 DIAGNOSIS — E66.9 OBESITY (BMI 30-39.9): ICD-10-CM

## 2017-12-29 DIAGNOSIS — R59.0 CERVICAL LYMPHADENOPATHY: ICD-10-CM

## 2017-12-29 DIAGNOSIS — R59.0 LYMPHADENOPATHY, AXILLARY: ICD-10-CM

## 2017-12-29 DIAGNOSIS — T78.40XD ALLERGIC REACTION, SUBSEQUENT ENCOUNTER: ICD-10-CM

## 2017-12-29 PROBLEM — T78.40XA ALLERGIC REACTION: Status: ACTIVE | Noted: 2017-12-29

## 2017-12-29 PROCEDURE — 99214 OFFICE O/P EST MOD 30 MIN: CPT | Performed by: INTERNAL MEDICINE

## 2017-12-29 RX ORDER — HYDROXYZINE PAMOATE 25 MG/1
25 CAPSULE ORAL 3 TIMES DAILY PRN
COMMUNITY
End: 2019-03-05

## 2017-12-29 RX ORDER — SERTRALINE HYDROCHLORIDE 100 MG/1
TABLET, FILM COATED ORAL
Qty: 90 TAB | Refills: 0 | Status: SHIPPED
Start: 2017-12-29 | End: 2018-03-29 | Stop reason: SDUPTHER

## 2017-12-29 NOTE — PROGRESS NOTES
Subjective:   Nikki Aponte is a 47 y.o. female here today for cervical lump, allergic reaction     47-year-old woman with past medical history of hypothyroidism, history of hodgkin disease, history of breast cancer, atrial fibrillation presented complain of a lump on the left lower periauricular area. She received CPAP machine. Placed CPAP machine, and the moment she placed the strip of CPAP machine caused her allergies, and irritation. She has significant dermatitis around her face. She started to use benadryl, and it is resolving. Today she noticed a lump on her neck. Lower auricular are on the left side, soft/to hard. Moves. Not indurated. No unintentional weight loss, night sweats. Swelling of lymphnodules on the axilla are about the same, no changes.     Current medicines (including changes today)  Current Outpatient Prescriptions   Medication Sig Dispense Refill   • sertraline (ZOLOFT) 100 MG Tab TAKE 1 TABLET BY MOUTH EVERY MORNING 90 Tab 0   • hydrOXYzine pamoate (VISTARIL) 25 MG Cap Take 25 mg by mouth 3 times a day as needed for Itching.     • levothyroxine (SYNTHROID) 112 MCG Tab Take 1 Tab by mouth Every morning on an empty stomach. 90 Tab 3   • metoprolol SR (TOPROL XL) 25 MG TABLET SR 24 HR Take 1 Tab by mouth every day. 30 Tab 3   • colchicine (COLCRYS) 0.6 MG Tab Take 1 Tab by mouth every day. 30 Tab 0   • omeprazole (PRILOSEC) 20 MG delayed-release capsule Take 1 Cap by mouth every morning before breakfast. 30 Cap 0   • sucralfate (CARAFATE) 1 GM Tab Take 1 Tab by mouth 4 Times a Day,Before Meals and at Bedtime. 120 Tab 0   • diphenhydrAMINE (BENADRYL) 25 MG Tab Take 25 mg by mouth as needed for Sleep.     • flecainide (TAMBOCOR) 100 MG Tab Take 1 Tab by mouth 2 times a day. 60 Tab 3   • ELIQUIS 5 MG Tab TAKE 1 TAB BY MOUTH 2 TIMES A DAY. 60 Tab 3   • albuterol (VENTOLIN HFA) 108 (90 BASE) MCG/ACT Aero Soln inhalation aerosol Inhale 2 Puffs by mouth every four hours as needed for Shortness of  Breath.     • PARAGARD INTRAUTERINE COPPER IUD by Intrauterine route.       No current facility-administered medications for this visit.      She  has a past medical history of Allergic rhinitis (4/4/2013); Aortic sclerosis (5/8/2014); Arrhythmia; Backpain; Breast cancer, right (CMS-HCC) (4/2016); Breath shortness; Bronchitis (2012); Cancer (CMS-HCC) (1990); Cancer (CMS-HCC) (2016); DCIS (ductal carcinoma in situ) (2015); Depression (4/4/2013); Endometriosis; Heart murmur; Hemorrhagic disorder (CMS-HCC); Hodgkin disease (CMS-HCC); Hypothyroidism (4/4/2013); Migraine variant (4/4/2013); Paroxysmal atrial fibrillation (CMS-HCC); Psychiatric problem; Sleep apnea; and Valvular heart disease.    Current Outpatient Prescriptions   Medication Sig Dispense Refill   • sertraline (ZOLOFT) 100 MG Tab TAKE 1 TABLET BY MOUTH EVERY MORNING 90 Tab 0   • hydrOXYzine pamoate (VISTARIL) 25 MG Cap Take 25 mg by mouth 3 times a day as needed for Itching.     • levothyroxine (SYNTHROID) 112 MCG Tab Take 1 Tab by mouth Every morning on an empty stomach. 90 Tab 3   • metoprolol SR (TOPROL XL) 25 MG TABLET SR 24 HR Take 1 Tab by mouth every day. 30 Tab 3   • colchicine (COLCRYS) 0.6 MG Tab Take 1 Tab by mouth every day. 30 Tab 0   • omeprazole (PRILOSEC) 20 MG delayed-release capsule Take 1 Cap by mouth every morning before breakfast. 30 Cap 0   • sucralfate (CARAFATE) 1 GM Tab Take 1 Tab by mouth 4 Times a Day,Before Meals and at Bedtime. 120 Tab 0   • diphenhydrAMINE (BENADRYL) 25 MG Tab Take 25 mg by mouth as needed for Sleep.     • flecainide (TAMBOCOR) 100 MG Tab Take 1 Tab by mouth 2 times a day. 60 Tab 3   • ELIQUIS 5 MG Tab TAKE 1 TAB BY MOUTH 2 TIMES A DAY. 60 Tab 3   • albuterol (VENTOLIN HFA) 108 (90 BASE) MCG/ACT Aero Soln inhalation aerosol Inhale 2 Puffs by mouth every four hours as needed for Shortness of Breath.     • PARAGARD INTRAUTERINE COPPER IUD by Intrauterine route.       No current facility-administered medications  "for this visit.        Allergies as of 12/29/2017 - Reviewed 12/29/2017   Allergen Reaction Noted   • Codeine Itching 08/01/2017   • Penicillins  12/01/2014       Social History     Social History   • Marital status: Single     Spouse name: N/A   • Number of children: N/A   • Years of education: N/A     Occupational History   • Not on file.     Social History Main Topics   • Smoking status: Former Smoker     Packs/day: 0.25     Years: 1.00     Types: Cigarettes     Quit date: 3/29/1991   • Smokeless tobacco: Never Used   • Alcohol use No      Comment: 1 per month   • Drug use: No   • Sexual activity: Yes     Birth control/ protection: IUD     Other Topics Concern   • Not on file     Social History Narrative   • No narrative on file        Family History   Problem Relation Age of Onset   • Stroke Mother    • Hypertension Mother    • Stroke Father    • Hypertension Father    • Cancer Father      prostate cancer   • Diabetes Neg Hx        Past Surgical History:   Procedure Laterality Date   • CARPAL TUNNEL RELEASE Right 4/21/2017    Procedure: CARPAL TUNNEL RELEASE;  Surgeon: Shay Powers M.D.;  Location: SURGERY [facility];  Service:    • CARPAL TUNNEL RELEASE Left 3/31/2017    Procedure: CARPAL TUNNEL RELEASE;  Surgeon: Shay Powers M.D.;  Location: SURGERY [facility];  Service:    • BREAST BIOPSY Right 5/25/2016    Procedure: BREAST BIOPSY FOR: RE-EXCISION OF RT BREAST TISSUE ;  Surgeon: Patricia Romero M.D.;  Location: SURGERY SAME DAY [facility];  Service:    • PARTIAL MASTECTOMY Right 5/10/2016    Procedure: PARTIAL MASTECTOMY;  Surgeon: Patricia Romero M.D.;  Location: SURGERY SAME DAY [facility];  Service:    • US-NEEDLE CORE BX-BREAST PANEL         ROS   No chest pain, no shortness of breath, no abdominal pain. See HPI        Objective:     Blood pressure 112/64, pulse 87, temperature 36.7 °C (98.1 °F), resp. rate 16, height 1.702 m (5' 7\"), weight 92.1 kg (203 lb), last menstrual " period 11/15/2017, SpO2 94 %. Body mass index is 31.79 kg/m².   Physical Exam:  Constitutional: Alert, no distress.  Skin: Warm, dry, good turgor, patches, erythematous, blanchable , around her face, resolving   Eye: Equal, round and reactive, conjunctiva clear, lids normal.  ENMT: Lips without lesions, good dentition, oropharynx clear.  Neck: Trachea midline, no masses, no thyromegaly. +cervical, lower left auricular area, no supraclavicular lymphadenopathy  Respiratory: Unlabored respiratory effort,  Psych: Alert and oriented x3, normal affect and mood.        Assessment and Plan:   The following treatment plan was discussed    1. Obesity (BMI 30-39.9)  Counseling for a small portion, balanced diet, exercising for3-5 times per week.    2. Lymphadenopathy, axillary  Continue to monitor     3. Allergic reaction, subsequent encounter  Resolving.     4. Cervical lymphadenopathy  Continue to monitor. Likely reactive due to allergies, continue to monitor. US if still persistent   - Patient identified as having weight management issue.  Appropriate orders and counseling given.      Followup: Return if symptoms worsen or fail to improve, for Short.

## 2018-01-18 ENCOUNTER — TELEPHONE (OUTPATIENT)
Dept: CARDIOLOGY | Facility: MEDICAL CENTER | Age: 48
End: 2018-01-18

## 2018-01-18 NOTE — TELEPHONE ENCOUNTER
"Pt. Is wondering if she can postpone FV from 2-1-18 to the first week in Match. \"I feel pretty good.\" She reported 2-3 very brief (2 sec.) episodes of A Fib daily.  To Linda.  "

## 2018-01-18 NOTE — TELEPHONE ENCOUNTER
----- Message from Sherie Lee sent at 1/18/2018  2:02 PM PST -----  Regarding: rescheduling question  Contact: [phone]  JUWAN/grecia    Pt calling to ask if she reschedules her 2/1 appt with PB to a date first week of March, would PB be in agreement?  Please call pt at [phone]

## 2018-01-19 NOTE — TELEPHONE ENCOUNTER
Left message for pt. To call.    Message   Received: Today   Message Contents   HARRIET Fallon L.P.N.   Caller: Unspecified (Today,  2:20 PM)             yes

## 2018-03-08 ENCOUNTER — OFFICE VISIT (OUTPATIENT)
Dept: CARDIOLOGY | Facility: MEDICAL CENTER | Age: 48
End: 2018-03-08
Payer: COMMERCIAL

## 2018-03-08 VITALS
DIASTOLIC BLOOD PRESSURE: 80 MMHG | HEIGHT: 67 IN | HEART RATE: 80 BPM | OXYGEN SATURATION: 95 % | SYSTOLIC BLOOD PRESSURE: 136 MMHG | WEIGHT: 205 LBS | RESPIRATION RATE: 14 BRPM | BODY MASS INDEX: 32.18 KG/M2

## 2018-03-08 DIAGNOSIS — I48.91 ATRIAL FIBRILLATION, UNSPECIFIED TYPE (HCC): ICD-10-CM

## 2018-03-08 DIAGNOSIS — Z98.890 S/P ABLATION OF ATRIAL FIBRILLATION: ICD-10-CM

## 2018-03-08 DIAGNOSIS — I48.0 PAROXYSMAL ATRIAL FIBRILLATION (HCC): ICD-10-CM

## 2018-03-08 DIAGNOSIS — Z86.79 S/P ABLATION OF ATRIAL FIBRILLATION: ICD-10-CM

## 2018-03-08 PROCEDURE — 93000 ELECTROCARDIOGRAM COMPLETE: CPT | Performed by: NURSE PRACTITIONER

## 2018-03-08 PROCEDURE — 99214 OFFICE O/P EST MOD 30 MIN: CPT | Performed by: NURSE PRACTITIONER

## 2018-03-08 ASSESSMENT — ENCOUNTER SYMPTOMS
ABDOMINAL PAIN: 0
SORE THROAT: 0
FEVER: 0
CHILLS: 0
WHEEZING: 0
MYALGIAS: 0
HEARTBURN: 0
NAUSEA: 0
ORTHOPNEA: 0
CLAUDICATION: 0
PALPITATIONS: 1
SPEECH CHANGE: 0
VOMITING: 0
PSYCHIATRIC NEGATIVE: 1
BLURRED VISION: 0
COUGH: 0
DOUBLE VISION: 0
HEADACHES: 0
FOCAL WEAKNESS: 0
BRUISES/BLEEDS EASILY: 0
SHORTNESS OF BREATH: 0
BLOOD IN STOOL: 0
DIZZINESS: 0
PND: 0
LOSS OF CONSCIOUSNESS: 0

## 2018-03-08 NOTE — LETTER
[facility]   [address], [address]  [place] [address]  Phone: [phone]  Fax: [phone]              Nikki Bree Aponte  1970    Encounter Date: 3/8/2018    HARRIET Fallon          PROGRESS NOTE:  No notes on file      Ruddy Mansfield M.D.  [address]  [address]  [place] [address]  VIA In Basket

## 2018-03-09 ENCOUNTER — APPOINTMENT (OUTPATIENT)
Dept: DERMATOLOGY | Facility: IMAGING CENTER | Age: 48
End: 2018-03-09

## 2018-03-09 NOTE — PROGRESS NOTES
"Subjective:   Nikki Aponte is a 47 y.o. female who presents today for review of her cardiac rhythm status after PVI with Dr Ye on 12/7/17.  HISTORY OF PRESENT HOSPITALIZATION:  The patient is a 47-year-old female who was seen in consultation by Dr. Urbano Ye actually has been followed longitudinally by Dr. Urbano Ye, was last seen in our office on November 1st with a history of recurrence of paroxysmal atrial fibrillation despite Multaq   utilization. Zio demonstrated greater than 30%, atrial fib burden.  Patient therefore was started on flecainide.  My understanding is she had a normal MPI and therefore was scheduled electively for atrial fib ablation.  The   patient was admitted on 12/07/2017 for that purpose.  Please see procedure report.  She is feeling well post procedure. She has more energy and is feeling much better.  She denies increasing chest pain has had some chest pressure that has gotten better since procedure. No temperatures. No SOB. Catheter insertion sites are uncomplicated.     Post procedure she did have some chest discomfort with a deep breath it has improved.  Her neck veins are flat she reports no fevers.  No SOB. Minimal episodes of AF seconds in duration occurring every 2-3 weeks.  She continues now on Flecainide and Eliquis.    Chief Complaint: atrial fibrillation    Past Medical History:   Diagnosis Date   • Allergic rhinitis 4/4/2013   • Aortic sclerosis 5/8/2014   • Arrhythmia     \"Skips a beat\"   • Backpain    • Breast cancer, right (CMS-[facility]) 4/2016   • Breath shortness     \"I have scar tissue in right lung from previous radiation, use my inhaler about once a week\"   • Bronchitis 2012   • Cancer (CMS-[facility]) 1990    hodgkins, previous chemo, radiation    • Cancer (CMS-HCC) 2016    DCIS right breast lumpectomy radiation   • DCIS (ductal carcinoma in situ) 2015   • Depression 4/4/2013   • Endometriosis    • Heart murmur    • Hemorrhagic disorder (CMS-HCC)     on Eliquis   • Hodgkin " disease (CMS-HCC)    • Hypothyroidism 4/4/2013   • Migraine variant 4/4/2013   • Paroxysmal atrial fibrillation (CMS-HCC)    • Psychiatric problem    • Sleep apnea     CPAP   • Valvular heart disease     moderate AI      Past Surgical History:   Procedure Laterality Date   • CARPAL TUNNEL RELEASE Right 4/21/2017    Procedure: CARPAL TUNNEL RELEASE;  Surgeon: Shay Powers M.D.;  Location: SURGERY [facility];  Service:    • CARPAL TUNNEL RELEASE Left 3/31/2017    Procedure: CARPAL TUNNEL RELEASE;  Surgeon: Shay Powers M.D.;  Location: SURGERY [facility];  Service:    • BREAST BIOPSY Right 5/25/2016    Procedure: BREAST BIOPSY FOR: RE-EXCISION OF RT BREAST TISSUE ;  Surgeon: Patricia Romero M.D.;  Location: SURGERY SAME DAY [facility];  Service:    • PARTIAL MASTECTOMY Right 5/10/2016    Procedure: PARTIAL MASTECTOMY;  Surgeon: Patricia Romero M.D.;  Location: SURGERY SAME DAY [facility];  Service:    • US-NEEDLE CORE BX-BREAST PANEL       Family History   Problem Relation Age of Onset   • Stroke Mother    • Hypertension Mother    • Stroke Father    • Hypertension Father    • Cancer Father      prostate cancer   • Diabetes Neg Hx      History   Smoking Status   • Former Smoker   • Packs/day: 0.25   • Years: 1.00   • Types: Cigarettes   • Quit date: 3/29/1991   Smokeless Tobacco   • Never Used     Allergies   Allergen Reactions   • Codeine Itching   • Penicillins      States that it does not treat anything when she has been on it.      Outpatient Encounter Prescriptions as of 3/8/2018   Medication Sig Dispense Refill   • ELIQUIS 5 MG Tab TAKE 1 TABLET BY MOUTH TWO TIMES A DAY 60 Tab 11   • sertraline (ZOLOFT) 100 MG Tab TAKE 1 TABLET BY MOUTH EVERY MORNING 90 Tab 0   • hydrOXYzine pamoate (VISTARIL) 25 MG Cap Take 25 mg by mouth 3 times a day as needed for Itching.     • levothyroxine (SYNTHROID) 112 MCG Tab Take 1 Tab by mouth Every morning on an empty stomach. 90 Tab 3   • omeprazole  "(PRILOSEC) 20 MG delayed-release capsule Take 1 Cap by mouth every morning before breakfast. 30 Cap 0   • diphenhydrAMINE (BENADRYL) 25 MG Tab Take 25 mg by mouth as needed for Sleep.     • flecainide (TAMBOCOR) 100 MG Tab Take 1 Tab by mouth 2 times a day. 60 Tab 3   • albuterol (VENTOLIN HFA) 108 (90 BASE) MCG/ACT Aero Soln inhalation aerosol Inhale 2 Puffs by mouth every four hours as needed for Shortness of Breath.     • PARAGARD INTRAUTERINE COPPER IUD by Intrauterine route.     • metoprolol SR (TOPROL XL) 25 MG TABLET SR 24 HR Take 1 Tab by mouth every day. (Patient not taking: Reported on 3/8/2018) 30 Tab 3   • sucralfate (CARAFATE) 1 GM Tab Take 1 Tab by mouth 4 Times a Day,Before Meals and at Bedtime. (Patient not taking: Reported on 3/8/2018) 120 Tab 0   • [DISCONTINUED] colchicine (COLCRYS) 0.6 MG Tab Take 1 Tab by mouth every day. (Patient not taking: Reported on 3/8/2018) 30 Tab 0     No facility-administered encounter medications on file as of 3/8/2018.      Review of Systems   Constitutional: Negative for chills and fever.   HENT: Negative for sore throat.         No difficulty swallowing   Eyes: Negative for blurred vision and double vision.   Respiratory: Negative for cough, shortness of breath and wheezing.    Cardiovascular: Positive for chest pain and palpitations. Negative for orthopnea, claudication, leg swelling and PND.   Gastrointestinal: Negative for abdominal pain, blood in stool, heartburn, nausea and vomiting.   Genitourinary: Negative for dysuria, frequency, hematuria and urgency.   Musculoskeletal: Negative for myalgias.   Skin: Negative.    Neurological: Negative for dizziness, speech change, focal weakness, loss of consciousness and headaches.   Endo/Heme/Allergies: Does not bruise/bleed easily.   Psychiatric/Behavioral: Negative.         Objective:   /80   Pulse 80   Resp 14   Ht 1.702 m (5' 7\")   Wt 93 kg (205 lb)   SpO2 95%   BMI 32.11 kg/m²     Physical Exam "   Constitutional: She is oriented to person, place, and time. She appears well-developed and well-nourished.   HENT:   Head: Normocephalic and atraumatic.   Eyes: Pupils are equal, round, and reactive to light.   Neck: Normal range of motion. Neck supple. No JVD present. No thyromegaly present.   Cardiovascular: Normal rate, regular rhythm and intact distal pulses.  Exam reveals no gallop and no friction rub.    Murmur heard.  Pulmonary/Chest: Effort normal and breath sounds normal. No respiratory distress. She has no wheezes. She has no rales. She exhibits no tenderness.   Abdominal: Soft. Bowel sounds are normal. She exhibits no distension. There is no tenderness. There is no guarding.   Musculoskeletal: Normal range of motion. She exhibits no edema.   Neurological: She is alert and oriented to person, place, and time.   Skin: Skin is warm and dry.   Psychiatric: She has a normal mood and affect.   EKG SR    Assessment:     1. Atrial fibrillation, unspecified type (CMS-HCC)  EKG   2. Paroxysmal atrial fibrillation (CMS-HCC)     3. S/P ablation of atrial fibrillation         Medical Decision Making:  Today's Assessment / Status / Plan:     1. AF minimal short duration events. Continues on flecainide and Eliquis.  2. PAF duration seconds only every 2-3 weeks.  3. S/P ablation 12/7/17.  RTC to see Dr Ye.  To continue AA and OAC as prescribed.  Medications will be reviewed by Dr Ye at next visit.    Collaborating MD Denis

## 2018-03-11 LAB — EKG IMPRESSION: NORMAL

## 2018-03-29 RX ORDER — SERTRALINE HYDROCHLORIDE 100 MG/1
TABLET, FILM COATED ORAL
Qty: 90 TAB | Refills: 3 | Status: SHIPPED | OUTPATIENT
Start: 2018-03-29 | End: 2019-04-19 | Stop reason: SDUPTHER

## 2018-04-02 RX ORDER — FLECAINIDE ACETATE 100 MG/1
100 TABLET ORAL 2 TIMES DAILY
Qty: 180 TAB | Refills: 3 | OUTPATIENT
Start: 2018-04-02 | End: 2018-05-09

## 2018-04-10 ENCOUNTER — OFFICE VISIT (OUTPATIENT)
Dept: CARDIOLOGY | Facility: MEDICAL CENTER | Age: 48
End: 2018-04-10
Payer: COMMERCIAL

## 2018-04-10 VITALS
SYSTOLIC BLOOD PRESSURE: 110 MMHG | HEART RATE: 86 BPM | BODY MASS INDEX: 32.65 KG/M2 | WEIGHT: 208 LBS | HEIGHT: 67 IN | DIASTOLIC BLOOD PRESSURE: 70 MMHG | OXYGEN SATURATION: 93 %

## 2018-04-10 DIAGNOSIS — Z98.890 S/P ABLATION OF ATRIAL FIBRILLATION: ICD-10-CM

## 2018-04-10 DIAGNOSIS — Z79.01 CHRONIC ANTICOAGULATION: ICD-10-CM

## 2018-04-10 DIAGNOSIS — Z86.79 S/P ABLATION OF ATRIAL FIBRILLATION: ICD-10-CM

## 2018-04-10 DIAGNOSIS — I48.91 ATRIAL FIBRILLATION, UNSPECIFIED TYPE (HCC): ICD-10-CM

## 2018-04-10 DIAGNOSIS — Z79.899 ENCOUNTER FOR MONITORING FLECAINIDE THERAPY: ICD-10-CM

## 2018-04-10 DIAGNOSIS — Z51.81 ENCOUNTER FOR MONITORING FLECAINIDE THERAPY: ICD-10-CM

## 2018-04-10 DIAGNOSIS — G47.33 OSA (OBSTRUCTIVE SLEEP APNEA): Chronic | ICD-10-CM

## 2018-04-10 PROCEDURE — 99214 OFFICE O/P EST MOD 30 MIN: CPT | Performed by: INTERNAL MEDICINE

## 2018-04-10 PROCEDURE — 93000 ELECTROCARDIOGRAM COMPLETE: CPT | Performed by: INTERNAL MEDICINE

## 2018-04-10 RX ORDER — METOPROLOL SUCCINATE 25 MG/1
25 TABLET, EXTENDED RELEASE ORAL DAILY
Qty: 30 TAB | Refills: 3 | Status: SHIPPED | OUTPATIENT
Start: 2018-04-10 | End: 2018-06-04 | Stop reason: SDUPTHER

## 2018-04-10 RX ORDER — METOPROLOL SUCCINATE 25 MG/1
25 TABLET, EXTENDED RELEASE ORAL DAILY
Qty: 30 TAB | Refills: 3 | Status: SHIPPED | OUTPATIENT
Start: 2018-04-10 | End: 2018-04-10

## 2018-04-10 NOTE — PROGRESS NOTES
"Arrhythmia Clinic Note (Established patient)    DOS: 4/10/2018    Chief complaint/Reason for consult: F/u PAF    Interval History:  Patient is a 47 yo F with history of sleep apnea and symptomatic PAF s/p prior AF ablation. Prior to that she had failed Multaq. She is still on Flecainide post ablation but doing well. She complains of \"flip-flopping\" in her heart at night lasting a second at a time. This is different from her AF.    ROS (+ highlighted in red):  Constitutional: Fevers/chills/fatigue/weightloss  Respiratory: Shortness of breath/cough  Cardiovascular: Chest pain/palpitations/edema/orthopnea/syncope    Past Medical History:   Diagnosis Date   • Allergic rhinitis 4/4/2013   • Aortic sclerosis 5/8/2014   • Arrhythmia     \"Skips a beat\"   • Backpain    • Breast cancer, right (CMS-HCC) 4/2016   • Breath shortness     \"I have scar tissue in right lung from previous radiation, use my inhaler about once a week\"   • Bronchitis 2012   • Cancer (CMS-HCC) 1990    hodgkins, previous chemo, radiation    • Cancer (CMS-HCC) 2016    DCIS right breast lumpectomy radiation   • DCIS (ductal carcinoma in situ) 2015   • Depression 4/4/2013   • Endometriosis    • Heart murmur    • Hemorrhagic disorder (CMS-HCC)     on Eliquis   • Hodgkin disease (CMS-HCC)    • Hypothyroidism 4/4/2013   • Migraine variant 4/4/2013   • Paroxysmal atrial fibrillation (CMS-HCC)    • Psychiatric problem    • Sleep apnea     CPAP   • Valvular heart disease     moderate AI        Allergies   Allergen Reactions   • Codeine Itching   • Penicillins      States that it does not treat anything when she has been on it.        Current Outpatient Prescriptions   Medication Sig Dispense Refill   • Cetirizine HCl (ZYRTEC ALLERGY PO) Take  by mouth.     • metoprolol SR (TOPROL XL) 25 MG TABLET SR 24 HR Take 1 Tab by mouth every day. 30 Tab 3   • flecainide (TAMBOCOR) 100 MG Tab Take 1 Tab by mouth 2 times a day. 180 Tab 3   • sertraline (ZOLOFT) 100 MG Tab TAKE " "1 TABLET BY MOUTH EVERY MORNING 90 Tab 3   • ELIQUIS 5 MG Tab TAKE 1 TABLET BY MOUTH TWO TIMES A DAY 60 Tab 11   • hydrOXYzine pamoate (VISTARIL) 25 MG Cap Take 25 mg by mouth 3 times a day as needed for Itching.     • levothyroxine (SYNTHROID) 112 MCG Tab Take 1 Tab by mouth Every morning on an empty stomach. 90 Tab 3   • diphenhydrAMINE (BENADRYL) 25 MG Tab Take 25 mg by mouth as needed for Sleep.     • albuterol (VENTOLIN HFA) 108 (90 BASE) MCG/ACT Aero Soln inhalation aerosol Inhale 2 Puffs by mouth every four hours as needed for Shortness of Breath (prn).     • PARAGARD INTRAUTERINE COPPER IUD by Intrauterine route.     • omeprazole (PRILOSEC) 20 MG delayed-release capsule Take 1 Cap by mouth every morning before breakfast. (Patient not taking: Reported on 4/10/2018) 30 Cap 0   • sucralfate (CARAFATE) 1 GM Tab Take 1 Tab by mouth 4 Times a Day,Before Meals and at Bedtime. (Patient taking differently: Take 1 g by mouth 4 Times a Day,Before Meals and at Bedtime.) 120 Tab 0     No current facility-administered medications for this visit.        Physical Exam:  Vitals:    04/10/18 1542   BP: 110/70   Pulse: 86   SpO2: 93%   Weight: 94.3 kg (208 lb)   Height: 1.702 m (5' 7\")     General appearance: NAD, conversant   Eyes: anicteric sclerae, moist conjunctivae; no lid-lag; PERRLA  HENT: Atraumatic; oropharynx clear with moist mucous membranes and no mucosal ulcerations; normal hard and soft palate  Neck: Trachea midline; FROM, supple, no thyromegaly or lymphadenopathy  Lungs: CTA, with normal respiratory effort and no intercostal retractions  CV: RRR, no MRGs, no JVD  Abdomen: Soft, non-tender; no masses or HSM  Extremities: No peripheral edema or extremity lymphadenopathy  Skin: Normal temperature, turgor and texture; no rash, ulcers or subcutaneous nodules  Psych: Appropriate affect, alert and oriented to person, place and time    Data:  Labs reviewed    Prior echo/stress reviewed:  Normal LV systolic function, " normal stress    EKG interpreted by me:  NSR    Impression/Plan:  1. Atrial fibrillation, unspecified type (CMS-HCC)  EKG    HOLTER MONITOR STUDY    CANCELED: ZIO PATCH MONITOR   2. TONY (obstructive sleep apnea)     3. S/P ablation of atrial fibrillation     4. Chronic anticoagulation     5. Encounter for monitoring flecainide therapy       -Doing well, is still having intermittent palpitations different from her AF at night  -Feels like she may be having PVCs  -I would like her to come off the flecainide  -Let's get ambulatory monitoring off AADs  -If no recurrence of AF, stop AADs  -Continue OAC for now    Urbano Ye MD

## 2018-04-11 ENCOUNTER — TELEPHONE (OUTPATIENT)
Dept: CARDIOLOGY | Facility: MEDICAL CENTER | Age: 48
End: 2018-04-11

## 2018-04-11 NOTE — TELEPHONE ENCOUNTER
Spoke with patient; scheduled this Friday at 3:30; per SK ok to take the 3:30 zio slot.  Proper documentation in appointment notes as well.  FYNIK

## 2018-04-12 ENCOUNTER — SLEEP CENTER VISIT (OUTPATIENT)
Dept: SLEEP MEDICINE | Facility: MEDICAL CENTER | Age: 48
End: 2018-04-12
Payer: COMMERCIAL

## 2018-04-12 VITALS
RESPIRATION RATE: 16 BRPM | DIASTOLIC BLOOD PRESSURE: 80 MMHG | WEIGHT: 210 LBS | BODY MASS INDEX: 32.96 KG/M2 | HEIGHT: 67 IN | TEMPERATURE: 97.5 F | OXYGEN SATURATION: 92 % | HEART RATE: 90 BPM | SYSTOLIC BLOOD PRESSURE: 112 MMHG

## 2018-04-12 DIAGNOSIS — G47.33 OSA (OBSTRUCTIVE SLEEP APNEA): ICD-10-CM

## 2018-04-12 DIAGNOSIS — E66.9 OBESITY (BMI 30-39.9): ICD-10-CM

## 2018-04-12 DIAGNOSIS — J45.20 MILD INTERMITTENT ASTHMA WITHOUT COMPLICATION: ICD-10-CM

## 2018-04-12 PROCEDURE — 99213 OFFICE O/P EST LOW 20 MIN: CPT | Performed by: NURSE PRACTITIONER

## 2018-04-12 NOTE — PROGRESS NOTES
CC:  Here for f/u sleep issues as listed below    HPI:   Nikki presents today for follow up for TONY and asthma.  Recently had ablation with benefit 3 weeks ago for hx of atrial fibrillation.  She recently was placed on a holter for recent palpitations. PFTs from 3/2017 indicate a Fev1 of 1.92L or 64% predicted, Fev1/FVC ratio of 72, DLCO 121%. She has not used rescue inhaler in many months.        She was originally diagnosed in 2015 with mild TONY with an AHI of 20.4 and low oxygenation of 75% she did not pursue treatment at that time and attempted to work on weight loss, although she has increased weight since that time. Given ongoing symptoms she completed PSG 10/2017 which indicated an AHI of 15.1 that increases to 110 while in REM with low oxygenation of 68%.        Currently she is being treated with autoCPAP @ [id number].   Compliance download from the dates 3/13/2018 - 4/11/2018 indicates she is wearing the device 100% for an avg of 4 hours and 57 minutes per night with a reduced AHI of 2.4. She does not tolerate pressure, started gas bloating and too much pressure in the middle of the night, causing her to take off the machine.  She has gained 30 lbs since her ablation and since last OV she has started the gas bloating and pelvic pain, although was fine when she first started on therapy. She randomly had a period the other day. She wakes up refreshed and is less tired throughout the day. She denies morning H/A and sleep better overall. She will continue to clean supplies weekly and change them as insurance allows.           Patient Active Problem List    Diagnosis Date Noted   • TONY (obstructive sleep apnea)      Priority: Medium   • Obesity (BMI 30-39.9) 05/04/2017     Priority: Medium   • Paroxysmal atrial fibrillation (CMS-HCC) 12/22/2016     Priority: Medium   • Nonrheumatic aortic valve insufficiency 05/08/2014     Priority: Medium   • History of breast cancer 05/17/2017     Priority: Low   • S/P  "radiation therapy - 1990 Mantle Radiation      Priority: Low   • Allergic rhinitis due to allergen 05/08/2014     Priority: Low   • History of Hodgkin's disease 12/30/2013     Priority: Low   • Hypothyroidism 04/04/2013     Priority: Low   • Depression 04/04/2013     Priority: Low   • Mild intermittent asthma without complication 04/12/2018   • Allergic reaction 12/29/2017   • Cervical lymphadenopathy 12/29/2017   • S/P ablation of atrial fibrillation 12/26/2017   • Elevated LDL cholesterol level 12/22/2017   • Lymphadenopathy, axillary 12/22/2017   • Chronic anticoagulation 08/04/2017       Past Medical History:   Diagnosis Date   • Allergic rhinitis 4/4/2013   • Aortic sclerosis 5/8/2014   • Arrhythmia     \"Skips a beat\"   • Backpain    • Breast cancer, right (CMS-HCC) 4/2016   • Breath shortness     \"I have scar tissue in right lung from previous radiation, use my inhaler about once a week\"   • Bronchitis 2012   • Cancer (CMS-HCC) 1990    hodgkins, previous chemo, radiation    • Cancer (CMS-HCC) 2016    DCIS right breast lumpectomy radiation   • DCIS (ductal carcinoma in situ) 2015   • Depression 4/4/2013   • Endometriosis    • Heart murmur    • Hemorrhagic disorder (CMS-[facility])     on Eliquis   • Hodgkin disease (CMS-HCC)    • Hypothyroidism 4/4/2013   • Migraine variant 4/4/2013   • Paroxysmal atrial fibrillation (CMS-[facility])    • Psychiatric problem    • Sleep apnea     CPAP   • Valvular heart disease     moderate AI        Past Surgical History:   Procedure Laterality Date   • CARPAL TUNNEL RELEASE Right 4/21/2017    Procedure: CARPAL TUNNEL RELEASE;  Surgeon: Shay Powers M.D.;  Location: SURGERY [facility];  Service:    • CARPAL TUNNEL RELEASE Left 3/31/2017    Procedure: CARPAL TUNNEL RELEASE;  Surgeon: Shay Powers M.D.;  Location: SURGERY [facility];  Service:    • BREAST BIOPSY Right 5/25/2016    Procedure: BREAST BIOPSY FOR: RE-EXCISION OF RT BREAST TISSUE ;  Surgeon: Patricia Romero, " M.D.;  Location: SURGERY SAME DAY [facility];  Service:    • PARTIAL MASTECTOMY Right 5/10/2016    Procedure: PARTIAL MASTECTOMY;  Surgeon: Patricia Romero M.D.;  Location: SURGERY SAME DAY [facility];  Service:    • US-NEEDLE CORE BX-BREAST PANEL         Family History   Problem Relation Age of Onset   • Stroke Mother    • Hypertension Mother    • Stroke Father    • Hypertension Father    • Cancer Father      prostate cancer   • Diabetes Neg Hx        Social History     Social History   • Marital status: Single     Spouse name: N/A   • Number of children: N/A   • Years of education: N/A     Occupational History   • Not on file.     Social History Main Topics   • Smoking status: Former Smoker     Packs/day: 0.25     Years: 1.00     Types: Cigarettes     Quit date: 3/29/1991   • Smokeless tobacco: Never Used   • Alcohol use No      Comment: 1 per month   • Drug use: No   • Sexual activity: Yes     Birth control/ protection: IUD     Other Topics Concern   • Not on file     Social History Narrative   • No narrative on file       Current Outpatient Prescriptions   Medication Sig Dispense Refill   • Cetirizine HCl (ZYRTEC ALLERGY PO) Take  by mouth.     • metoprolol SR (TOPROL XL) 25 MG TABLET SR 24 HR Take 1 Tab by mouth every day. 30 Tab 3   • sertraline (ZOLOFT) 100 MG Tab TAKE 1 TABLET BY MOUTH EVERY MORNING 90 Tab 3   • ELIQUIS 5 MG Tab TAKE 1 TABLET BY MOUTH TWO TIMES A DAY 60 Tab 11   • hydrOXYzine pamoate (VISTARIL) 25 MG Cap Take 25 mg by mouth 3 times a day as needed for Itching.     • levothyroxine (SYNTHROID) 112 MCG Tab Take 1 Tab by mouth Every morning on an empty stomach. 90 Tab 3   • diphenhydrAMINE (BENADRYL) 25 MG Tab Take 25 mg by mouth as needed for Sleep.     • PARAGARD INTRAUTERINE COPPER IUD by Intrauterine route.     • flecainide (TAMBOCOR) 100 MG Tab Take 1 Tab by mouth 2 times a day. (Patient not taking: Reported on 4/12/2018) 180 Tab 3   • omeprazole (PRILOSEC) 20 MG delayed-release capsule  "Take 1 Cap by mouth every morning before breakfast. (Patient not taking: Reported on 4/10/2018) 30 Cap 0   • sucralfate (CARAFATE) 1 GM Tab Take 1 Tab by mouth 4 Times a Day,Before Meals and at Bedtime. (Patient not taking: Reported on 4/12/2018) 120 Tab 0   • albuterol (VENTOLIN HFA) 108 (90 BASE) MCG/ACT Aero Soln inhalation aerosol Inhale 2 Puffs by mouth every four hours as needed for Shortness of Breath (prn).       No current facility-administered medications for this visit.           Allergies: Codeine and Penicillins      ROS   Gen: Denies fever, chills, unintentional weight loss, fatigue  Resp:Denies Dyspnea  CV: Denies chest pain, chest tightness  Sleep:Denies morning headache, insomnia, daytime somnolence, snoring, gasping for air, apnea  Neuro: Denies frequent headaches, weakness, dizziness  See HPI.  All other systems reviewed and negative        Vital signs for this encounter:  Vitals:    04/12/18 1420   Height: 1.702 m (5' 7\")   Weight: 95.3 kg (210 lb)   Weight % change since last entry.: 0 %   BP: 112/80   Pulse: 90   BMI (Calculated): 32.89   Resp: 16   Temp: 36.4 °C (97.5 °F)   O2 sat % room air: 92 %                   Physical Exam:   Gen:         Alert and oriented, No apparent distress.   Neck:        No Lymphadenopathy.  Lungs:     Clear to auscultation bilaterally.    CV:          Regular rate and rhythm. No murmurs, rubs or gallops.   Abd:         Soft non tender, non distended.            Ext:          No clubbing, cyanosis, edema.    Assessment   1. TONY (obstructive sleep apnea)  DME OTHER   2. Obesity (BMI 30-39.9)     3. Mild intermittent asthma without complication         PLAN:   Patient Instructions   1) Continue autoCPAP and change pressure at [id number]  2) Clean mask and supplies weekly and change them as insurance allows  3) Vaccines: Up to date with flu  4) f/u with primary for increase weight x 2 months and OB/Gyno for pelvic pain if not resolved  5) Return in about 2 months " (around 6/12/2018) for Compliance, review of symptoms, if not sooner, follow up with ANNABEL Agudelo.

## 2018-04-12 NOTE — PATIENT INSTRUCTIONS
1) Continue autoCPAP at [id number]  2) Clean mask and supplies weekly and change them as insurance allows  3) Vaccines: Up to date with flu  5) Return in about 2 months (around 6/12/2018) for Compliance, review of symptoms, if not sooner, follow up with ANNABEL Agudelo.

## 2018-04-13 ENCOUNTER — APPOINTMENT (OUTPATIENT)
Dept: DERMATOLOGY | Facility: IMAGING CENTER | Age: 48
End: 2018-04-13

## 2018-04-13 ENCOUNTER — NON-PROVIDER VISIT (OUTPATIENT)
Dept: CARDIOLOGY | Facility: MEDICAL CENTER | Age: 48
End: 2018-04-13
Payer: COMMERCIAL

## 2018-04-13 DIAGNOSIS — I48.91 ATRIAL FIBRILLATION, UNSPECIFIED TYPE (HCC): ICD-10-CM

## 2018-04-13 LAB — EKG IMPRESSION: NORMAL

## 2018-04-17 DIAGNOSIS — I48.91 ATRIAL FIBRILLATION, UNSPECIFIED TYPE (HCC): ICD-10-CM

## 2018-04-23 LAB — EKG IMPRESSION: NORMAL

## 2018-04-23 PROCEDURE — 93224 XTRNL ECG REC UP TO 48 HRS: CPT | Performed by: INTERNAL MEDICINE

## 2018-05-09 ENCOUNTER — TELEPHONE (OUTPATIENT)
Dept: CARDIOLOGY | Facility: MEDICAL CENTER | Age: 48
End: 2018-05-09

## 2018-05-09 NOTE — TELEPHONE ENCOUNTER
HOLTER MONITOR STUDY   Order: [id number]   Status:  Final result   Visible to patient:  Yes (MyChart) Dx:  Atrial fibrillation, unspecified type...   Notes recorded by Gisela Marie R.N. on 5/9/2018 at 9:12 AM PDT  Pt was notified. Will remain off flecainide. MAR updated. Number given to call for 3-4 mo. FV  ------    Notes recorded by Urbano Ye M.D. on 5/8/2018 at 12:58 PM PDT  No significant AF recurrence (>6 mins) on monitoring. Can stay off AADs. Schedule routine f/u in 3-4 months.

## 2018-06-04 DIAGNOSIS — I10 ESSENTIAL HYPERTENSION: ICD-10-CM

## 2018-06-04 RX ORDER — METOPROLOL SUCCINATE 25 MG/1
25 TABLET, EXTENDED RELEASE ORAL DAILY
Qty: 90 TAB | Refills: 3 | Status: SHIPPED | OUTPATIENT
Start: 2018-06-04 | End: 2019-05-07 | Stop reason: SDUPTHER

## 2018-07-31 ENCOUNTER — HOSPITAL ENCOUNTER (OUTPATIENT)
Dept: RADIOLOGY | Facility: MEDICAL CENTER | Age: 48
End: 2018-07-31
Attending: OBSTETRICS & GYNECOLOGY
Payer: COMMERCIAL

## 2018-07-31 DIAGNOSIS — Z12.39 SCREENING BREAST EXAMINATION: ICD-10-CM

## 2018-07-31 PROCEDURE — 77067 SCR MAMMO BI INCL CAD: CPT

## 2018-08-22 ENCOUNTER — OFFICE VISIT (OUTPATIENT)
Dept: MEDICAL GROUP | Facility: PHYSICIAN GROUP | Age: 48
End: 2018-08-22
Payer: COMMERCIAL

## 2018-08-22 VITALS
OXYGEN SATURATION: 98 % | SYSTOLIC BLOOD PRESSURE: 110 MMHG | HEIGHT: 67 IN | RESPIRATION RATE: 16 BRPM | DIASTOLIC BLOOD PRESSURE: 70 MMHG | HEART RATE: 90 BPM | TEMPERATURE: 98.2 F | BODY MASS INDEX: 31.39 KG/M2 | WEIGHT: 200 LBS

## 2018-08-22 DIAGNOSIS — H92.03 EARACHE SYMPTOMS IN BOTH EARS: ICD-10-CM

## 2018-08-22 DIAGNOSIS — J02.9 SORE THROAT: ICD-10-CM

## 2018-08-22 DIAGNOSIS — E66.9 OBESITY (BMI 30-39.9): ICD-10-CM

## 2018-08-22 PROBLEM — T78.40XA ALLERGIC REACTION: Status: RESOLVED | Noted: 2017-12-29 | Resolved: 2018-08-22

## 2018-08-22 PROCEDURE — 99213 OFFICE O/P EST LOW 20 MIN: CPT | Performed by: INTERNAL MEDICINE

## 2018-08-22 ASSESSMENT — ENCOUNTER SYMPTOMS
HEARTBURN: 0
MYALGIAS: 0
FEVER: 0
SHORTNESS OF BREATH: 0
SINUS PAIN: 1
SORE THROAT: 1
EYE PAIN: 0
DIZZINESS: 0
BLURRED VISION: 0
PALPITATIONS: 0
WEIGHT LOSS: 0
DEPRESSION: 0
COUGH: 0
CHILLS: 1

## 2018-08-22 NOTE — PROGRESS NOTES
Subjective:   Nikki Aponte is a 48 y.o. female here today for sore throat, earache    48-year-old female past medical history of breast cancer, Hodgkin's disease, atrial fibrillation presented today complaining of sore throat and earache for the last 4 days.  She denies fever, cough, night sweats. She now lives in [place]. Not sure if her symptomts high altitude. No one else sick around her. She denies sob, chest pain. She is not using anything     Current medicines (including changes today)  Current Outpatient Prescriptions   Medication Sig Dispense Refill   • metoprolol SR (TOPROL XL) 25 MG TABLET SR 24 HR Take 1 Tab by mouth every day. 90 Tab 3   • Cetirizine HCl (ZYRTEC ALLERGY PO) Take  by mouth.     • sertraline (ZOLOFT) 100 MG Tab TAKE 1 TABLET BY MOUTH EVERY MORNING 90 Tab 3   • ELIQUIS 5 MG Tab TAKE 1 TABLET BY MOUTH TWO TIMES A DAY 60 Tab 11   • hydrOXYzine pamoate (VISTARIL) 25 MG Cap Take 25 mg by mouth 3 times a day as needed for Itching.     • levothyroxine (SYNTHROID) 112 MCG Tab Take 1 Tab by mouth Every morning on an empty stomach. 90 Tab 3   • omeprazole (PRILOSEC) 20 MG delayed-release capsule Take 1 Cap by mouth every morning before breakfast. 30 Cap 0   • sucralfate (CARAFATE) 1 GM Tab Take 1 Tab by mouth 4 Times a Day,Before Meals and at Bedtime. 120 Tab 0   • diphenhydrAMINE (BENADRYL) 25 MG Tab Take 25 mg by mouth as needed for Sleep.     • albuterol (VENTOLIN HFA) 108 (90 BASE) MCG/ACT Aero Soln inhalation aerosol Inhale 2 Puffs by mouth every four hours as needed for Shortness of Breath (prn).     • PARAGARD INTRAUTERINE COPPER IUD by Intrauterine route.       No current facility-administered medications for this visit.      She  has a past medical history of Allergic rhinitis (4/4/2013); Aortic sclerosis (5/8/2014); Arrhythmia; Backpain; Breast cancer, right (HCC) (4/2016); Breath shortness; Bronchitis (2012); Cancer (HCC) (1990); Cancer (HCC) (2016); DCIS (ductal carcinoma in situ)  (2015); Depression (4/4/2013); Endometriosis; Heart murmur; Hemorrhagic disorder (HCC); Hodgkin disease (HCC); Hypothyroidism (4/4/2013); Migraine variant (4/4/2013); Paroxysmal atrial fibrillation (HCC); Psychiatric problem; Sleep apnea; and Valvular heart disease.    Current Outpatient Prescriptions   Medication Sig Dispense Refill   • metoprolol SR (TOPROL XL) 25 MG TABLET SR 24 HR Take 1 Tab by mouth every day. 90 Tab 3   • Cetirizine HCl (ZYRTEC ALLERGY PO) Take  by mouth.     • sertraline (ZOLOFT) 100 MG Tab TAKE 1 TABLET BY MOUTH EVERY MORNING 90 Tab 3   • ELIQUIS 5 MG Tab TAKE 1 TABLET BY MOUTH TWO TIMES A DAY 60 Tab 11   • hydrOXYzine pamoate (VISTARIL) 25 MG Cap Take 25 mg by mouth 3 times a day as needed for Itching.     • levothyroxine (SYNTHROID) 112 MCG Tab Take 1 Tab by mouth Every morning on an empty stomach. 90 Tab 3   • omeprazole (PRILOSEC) 20 MG delayed-release capsule Take 1 Cap by mouth every morning before breakfast. 30 Cap 0   • sucralfate (CARAFATE) 1 GM Tab Take 1 Tab by mouth 4 Times a Day,Before Meals and at Bedtime. 120 Tab 0   • diphenhydrAMINE (BENADRYL) 25 MG Tab Take 25 mg by mouth as needed for Sleep.     • albuterol (VENTOLIN HFA) 108 (90 BASE) MCG/ACT Aero Soln inhalation aerosol Inhale 2 Puffs by mouth every four hours as needed for Shortness of Breath (prn).     • PARAGARD INTRAUTERINE COPPER IUD by Intrauterine route.       No current facility-administered medications for this visit.        Allergies as of 08/22/2018 - Reviewed 08/22/2018   Allergen Reaction Noted   • Codeine Itching 08/01/2017   • Penicillins  12/01/2014       Social History     Social History   • Marital status: Single     Spouse name: N/A   • Number of children: N/A   • Years of education: N/A     Occupational History   • Not on file.     Social History Main Topics   • Smoking status: Former Smoker     Packs/day: 0.25     Years: 1.00     Types: Cigarettes     Quit date: 3/29/1991   • Smokeless tobacco: Never  Used   • Alcohol use No      Comment: 1 per month   • Drug use: No   • Sexual activity: Yes     Birth control/ protection: IUD     Other Topics Concern   • Not on file     Social History Narrative   • No narrative on file        Family History   Problem Relation Age of Onset   • Stroke Mother    • Hypertension Mother    • Stroke Father    • Hypertension Father    • Cancer Father         prostate cancer   • Diabetes Neg Hx        Past Surgical History:   Procedure Laterality Date   • CARPAL TUNNEL RELEASE Right 4/21/2017    Procedure: CARPAL TUNNEL RELEASE;  Surgeon: Shay Powers M.D.;  Location: SURGERY [facility];  Service:    • CARPAL TUNNEL RELEASE Left 3/31/2017    Procedure: CARPAL TUNNEL RELEASE;  Surgeon: Shay Powers M.D.;  Location: SURGERY [facility];  Service:    • BREAST BIOPSY Right 5/25/2016    Procedure: BREAST BIOPSY FOR: RE-EXCISION OF RT BREAST TISSUE ;  Surgeon: Patricia Romero M.D.;  Location: SURGERY SAME DAY [facility];  Service:    • PARTIAL MASTECTOMY Right 5/10/2016    Procedure: PARTIAL MASTECTOMY;  Surgeon: Patricia Romero M.D.;  Location: SURGERY SAME DAY [facility];  Service:    • US-NEEDLE CORE BX-BREAST PANEL         ROS   Review of Systems   Constitutional: Positive for chills. Negative for fever, malaise/fatigue and weight loss.   HENT: Positive for ear pain, sinus pain and sore throat. Negative for congestion, hearing loss and tinnitus.    Eyes: Negative for blurred vision and pain.   Respiratory: Negative for cough and shortness of breath.    Cardiovascular: Negative for chest pain and palpitations.   Gastrointestinal: Negative for heartburn.   Genitourinary: Negative for dysuria.   Musculoskeletal: Negative for myalgias.   Skin: Negative for rash.   Neurological: Negative for dizziness.   Psychiatric/Behavioral: Negative for depression.            Objective:     Blood pressure 110/70, pulse 90, temperature 36.8 °C (98.2 °F), resp. rate 16, height 1.702 m  "(5' 7\"), weight 90.7 kg (200 lb), SpO2 98 %, not currently breastfeeding. Body mass index is 31.32 kg/m².   Physical Exam:  Constitutional: Alert, no distress.  Skin: Warm, dry, good turgor, no rashes in visible areas.  Eye: Equal, round and reactive, conjunctiva clear, lids normal.  ENMT: Lips without lesions, good dentition, oropharynx clear. Tympanic membrane clear and intact bilateral   Neck: Trachea midline, no masses, no thyromegaly. No cervical or supraclavicular lymphadenopathy  Respiratory: Unlabored respiratory effort, lungs clear to auscultation, no wheezes, no ronchi.  Cardiovascular: Normal S1, S2, no murmur, no edema.  Abdomen: Soft, non-tender, no masses, no hepatosplenomegaly.  Psych: Alert and oriented x3, normal affect and mood.        Assessment and Plan:   The following treatment plan was discussed    1. Sore throat  2. Earache symptoms in both ears  Possible viral related. There is no indication for antibiotic at this time. I advise gurgling, tylenol prn. I advise pt to follow up if worse or not feeling better     3. Obesity (BMI 30-39.9)  Counseling for a small portion, balanced diet, exercising for3-5 times per week.    - Patient identified as having weight management issue.  Appropriate orders and counseling given.      Followup: Return if symptoms worsen or fail to improve.         "

## 2018-08-23 ENCOUNTER — SLEEP CENTER VISIT (OUTPATIENT)
Dept: SLEEP MEDICINE | Facility: MEDICAL CENTER | Age: 48
End: 2018-08-23
Payer: COMMERCIAL

## 2018-08-23 ENCOUNTER — APPOINTMENT (OUTPATIENT)
Dept: SLEEP MEDICINE | Facility: MEDICAL CENTER | Age: 48
End: 2018-08-23
Payer: COMMERCIAL

## 2018-08-23 VITALS
OXYGEN SATURATION: 96 % | WEIGHT: 200 LBS | DIASTOLIC BLOOD PRESSURE: 72 MMHG | HEART RATE: 88 BPM | RESPIRATION RATE: 16 BRPM | SYSTOLIC BLOOD PRESSURE: 118 MMHG | BODY MASS INDEX: 31.39 KG/M2 | HEIGHT: 67 IN

## 2018-08-23 DIAGNOSIS — J45.20 MILD INTERMITTENT ASTHMA WITHOUT COMPLICATION: ICD-10-CM

## 2018-08-23 DIAGNOSIS — G47.33 OSA (OBSTRUCTIVE SLEEP APNEA): Chronic | ICD-10-CM

## 2018-08-23 PROCEDURE — 99213 OFFICE O/P EST LOW 20 MIN: CPT | Performed by: NURSE PRACTITIONER

## 2018-08-23 ASSESSMENT — ENCOUNTER SYMPTOMS
GASTROINTESTINAL NEGATIVE: 1
CARDIOVASCULAR NEGATIVE: 1
CONSTITUTIONAL NEGATIVE: 1
NEUROLOGICAL NEGATIVE: 1
BRUISES/BLEEDS EASILY: 0
EYE PAIN: 0
MUSCULOSKELETAL NEGATIVE: 1
PSYCHIATRIC NEGATIVE: 1
RESPIRATORY NEGATIVE: 1
EYE DISCHARGE: 0

## 2018-08-23 NOTE — PROGRESS NOTES
"Chief Complaint   Patient presents with   • Follow-Up     2 Month FV          HPI: This patient is a 48 y.o. female, who presents for follow-up of TONY with compliance download.     PSG indicates moderate sleep apnea with an AHI of 15.1, REM index 110, minimum oxygen saturation of 68 %. she is compliant with auto CPAP 8-16 cm H2O. Compliance download over the past 30 days indicates 100 % compliance, average use of almost 5 hours per night, AHI of 2.4.  Her pressures were to be changed her last visit but this was not done.  She feels her pressures are too high.  It does cause significant bloating, mask leak.  It interferes with her sleep.  She does however find it to be somewhat beneficial.  She reports getting a better quality sleep.  She denies a.m. headache.    She has a history of asthma, PFTs from 2017 show an FEV1 of 1.92 L 64% predicted, DLCO 121% predicted.  Breathing is stable.  Denies dyspnea, cough or wheeze.    She has a history of A. fib, S/P ablation on chronic anticoagulation.    Past Medical History:   Diagnosis Date   • Allergic rhinitis 4/4/2013   • Aortic sclerosis 5/8/2014   • Arrhythmia     \"Skips a beat\"   • Backpain    • Breast cancer, right (HCC) 4/2016   • Breath shortness     \"I have scar tissue in right lung from previous radiation, use my inhaler about once a week\"   • Bronchitis 2012   • Cancer (HCC) 1990    hodgkins, previous chemo, radiation    • Cancer (HCC) 2016    DCIS right breast lumpectomy radiation   • DCIS (ductal carcinoma in situ) 2015   • Depression 4/4/2013   • Endometriosis    • Heart murmur    • Hemorrhagic disorder (HCC)     on Eliquis   • Hodgkin disease (HCC)    • Hypothyroidism 4/4/2013   • Migraine variant 4/4/2013   • Paroxysmal atrial fibrillation (HCC)    • Psychiatric problem    • Sleep apnea     CPAP   • Valvular heart disease     moderate AI        Social History   Substance Use Topics   • Smoking status: Former Smoker     Packs/day: 0.25     Years: 1.00     " Types: Cigarettes     Quit date: 3/29/1991   • Smokeless tobacco: Never Used   • Alcohol use No      Comment: 1 per month       Family History   Problem Relation Age of Onset   • Stroke Mother    • Hypertension Mother    • Stroke Father    • Hypertension Father    • Cancer Father         prostate cancer   • Diabetes Neg Hx        Immunization History   Administered Date(s) Administered   • Hepatitis A Vaccine, Adult 01/27/2006   • Influenza Vaccine Quad Inj (Pf) 12/22/2016, 09/05/2017   • Influenza, Unspecified 01/27/2006   • MMR Vaccine 05/15/1995   • Pneumococcal Vaccine (PCV7) Historical Data 05/31/2002   • TD Vaccine 05/15/1995, 01/27/2006   • Tdap Vaccine 12/22/2016       Current medications as of today   Current Outpatient Prescriptions   Medication Sig Dispense Refill   • metoprolol SR (TOPROL XL) 25 MG TABLET SR 24 HR Take 1 Tab by mouth every day. 90 Tab 3   • Cetirizine HCl (ZYRTEC ALLERGY PO) Take  by mouth.     • sertraline (ZOLOFT) 100 MG Tab TAKE 1 TABLET BY MOUTH EVERY MORNING 90 Tab 3   • ELIQUIS 5 MG Tab TAKE 1 TABLET BY MOUTH TWO TIMES A DAY 60 Tab 11   • hydrOXYzine pamoate (VISTARIL) 25 MG Cap Take 25 mg by mouth 3 times a day as needed for Itching.     • levothyroxine (SYNTHROID) 112 MCG Tab Take 1 Tab by mouth Every morning on an empty stomach. 90 Tab 3   • omeprazole (PRILOSEC) 20 MG delayed-release capsule Take 1 Cap by mouth every morning before breakfast. 30 Cap 0   • sucralfate (CARAFATE) 1 GM Tab Take 1 Tab by mouth 4 Times a Day,Before Meals and at Bedtime. 120 Tab 0   • diphenhydrAMINE (BENADRYL) 25 MG Tab Take 25 mg by mouth as needed for Sleep.     • albuterol (VENTOLIN HFA) 108 (90 BASE) MCG/ACT Aero Soln inhalation aerosol Inhale 2 Puffs by mouth every four hours as needed for Shortness of Breath (prn).     • PARAGARD INTRAUTERINE COPPER IUD by Intrauterine route.       No current facility-administered medications for this visit.        Allergies: Codeine and Penicillins    Blood  "pressure 118/72, pulse 88, resp. rate 16, height 1.702 m (5' 7\"), weight 90.7 kg (200 lb), SpO2 96 %.      Review of Systems   Constitutional: Negative.    HENT: Negative.    Eyes: Negative for pain and discharge.   Respiratory: Negative.    Cardiovascular: Negative.    Gastrointestinal: Negative.    Musculoskeletal: Negative.    Skin: Negative.    Neurological: Negative.    Endo/Heme/Allergies: Negative for environmental allergies. Does not bruise/bleed easily.   Psychiatric/Behavioral: Negative.        Physical Exam   Constitutional: She is oriented to person, place, and time and well-developed, well-nourished, and in no distress.   HENT:   Head: Normocephalic and atraumatic.   Eyes: Pupils are equal, round, and reactive to light.   Neck: Normal range of motion. Neck supple. No tracheal deviation present.   Pulmonary/Chest: Effort normal.   Musculoskeletal: Normal range of motion.   Neurological: She is alert and oriented to person, place, and time.   Skin: Skin is warm and dry.   Psychiatric: Mood, memory, affect and judgment normal.   Vitals reviewed.      Diagnoses/Plan:    1. TONY (obstructive sleep apnea)  Continue CPAP therapy nightly, clean mask and tubing weekly, pressure adjusted to 5-10 cm H2O.  - DME OTHER    2. Mild intermittent asthma without complication  Stable, asymptomatic, uses albuterol as needed    Follow-up in 3 months for compliance download, sooner if needed    "

## 2018-09-04 ENCOUNTER — TELEPHONE (OUTPATIENT)
Dept: PULMONOLOGY | Facility: HOSPICE | Age: 48
End: 2018-09-04

## 2018-09-04 DIAGNOSIS — J45.20 MILD INTERMITTENT ASTHMA WITHOUT COMPLICATION: ICD-10-CM

## 2018-09-04 DIAGNOSIS — G47.33 OSA (OBSTRUCTIVE SLEEP APNEA): ICD-10-CM

## 2018-09-04 NOTE — TELEPHONE ENCOUNTER
Last visit she told Eduardo she was feeling great and was interested in decreasing her pressure from 8 to 5cm. Pt is now complaining of extreme palpitations when she wakes up and waking gasping for air.  She is explained she is having some dizziness while driving. She would like the pressure to go back to 8cm. She is willing to bring her chip into [facility] today so it can be changed immediately is possible. Please advise    Pt denies all other symptoms.

## 2018-09-04 NOTE — TELEPHONE ENCOUNTER
Order signed to adjust pressures back to 8-16cm H20 nightly.  CNOX on pap also ordered.  Follow up at sleep center in 3 months with compliance card, sooner if needed.  Recommend f/u at pulmonary clinic in 6 months with PFT updated, sooner if needed.  Orders signed.    I actually originally saw this patient, then went to [place] when I was on maternity leave, no to venita rodrigues since [place] is on maternity leave

## 2018-09-06 NOTE — TELEPHONE ENCOUNTER
Called pt, she had adjusted it herself. I advised her to bring machine into the sleep center to make sure she had adjusted it correctly. She states she will bring machine/ Chip in tomorrow.   Also advised pt that she needs OPO and 3 Mth FU. She will make this tomorrow when she walks into sleep center

## 2018-10-05 ENCOUNTER — NON-PROVIDER VISIT (OUTPATIENT)
Dept: MEDICAL GROUP | Facility: PHYSICIAN GROUP | Age: 48
End: 2018-10-05
Payer: COMMERCIAL

## 2018-10-05 DIAGNOSIS — Z23 NEED FOR VACCINATION: ICD-10-CM

## 2018-10-05 PROCEDURE — 90686 IIV4 VACC NO PRSV 0.5 ML IM: CPT | Performed by: FAMILY MEDICINE

## 2018-10-05 PROCEDURE — 90471 IMMUNIZATION ADMIN: CPT | Performed by: FAMILY MEDICINE

## 2018-10-05 NOTE — PROGRESS NOTES
"Nikki Aponte is a 48 y.o. female here for a non-provider visit for:   FLU    Reason for immunization: Annual Flu Vaccine  Immunization records indicate need for vaccine: Yes, confirmed with Epic  Minimum interval has been met for this vaccine: Yes  ABN completed: No    Order and dose verified by: Nelida DONALDSON Dated  08/ was given to patient: Yes  All IAC Questionnaire questions were answered \"No.\"    Patient tolerated injection and no adverse effects were observed or reported: Yes    Pt scheduled for next dose in series: Not Indicated  "

## 2018-10-22 ENCOUNTER — TELEPHONE (OUTPATIENT)
Dept: CARDIOLOGY | Facility: MEDICAL CENTER | Age: 48
End: 2018-10-22

## 2018-10-22 NOTE — TELEPHONE ENCOUNTER
----- Message from Shiloh Kingston sent at 10/22/2018  3:21 PM PDT -----  Regarding: Patient needs authorization for surgery  GABY/Patricia    Patient wants a call back about getting authorization for her surgery and she can be reached at [phone].

## 2018-10-22 NOTE — TELEPHONE ENCOUNTER
Spoke to pt. She will be having a consultation for bariatric surgery at [facility] ([phone]) and they need a clearance.     To Dr. Ye--please provide cardiac clearance w/risk level and Eliquis recommendations for outpatient bariatric surgery.

## 2018-10-23 NOTE — TELEPHONE ENCOUNTER
Letter faxed to [facility] [phone].    Fax attempted multiple times without success. Called office & that is the only fax #. Letter mailed to:    [facility]  [address], [address]  [place] [address]

## 2018-11-14 RX ORDER — LEVOTHYROXINE SODIUM 112 UG/1
112 TABLET ORAL
Qty: 90 TAB | Refills: 0 | Status: SHIPPED | OUTPATIENT
Start: 2018-11-14 | End: 2019-02-05 | Stop reason: SDUPTHER

## 2019-01-07 ENCOUNTER — HOSPITAL ENCOUNTER (OUTPATIENT)
Dept: RADIOLOGY | Facility: MEDICAL CENTER | Age: 49
End: 2019-01-07
Attending: SURGERY
Payer: COMMERCIAL

## 2019-01-07 DIAGNOSIS — R59.0 BILATERAL HILAR ADENOPATHY SYNDROME: ICD-10-CM

## 2019-01-07 PROCEDURE — A9585 GADOBUTROL INJECTION: HCPCS | Performed by: SURGERY

## 2019-01-07 PROCEDURE — 700117 HCHG RX CONTRAST REV CODE 255: Performed by: SURGERY

## 2019-01-07 PROCEDURE — C8908 MRI W/O FOL W/CONT, BREAST,: HCPCS

## 2019-01-07 RX ORDER — GADOBUTROL 604.72 MG/ML
10 INJECTION INTRAVENOUS ONCE
Status: COMPLETED | OUTPATIENT
Start: 2019-01-07 | End: 2019-01-07

## 2019-01-07 RX ADMIN — GADOBUTROL 10 ML: 604.72 INJECTION INTRAVENOUS at 16:06

## 2019-01-10 ENCOUNTER — SLEEP CENTER VISIT (OUTPATIENT)
Dept: SLEEP MEDICINE | Facility: MEDICAL CENTER | Age: 49
End: 2019-01-10
Payer: COMMERCIAL

## 2019-01-10 VITALS
BODY MASS INDEX: 26.68 KG/M2 | RESPIRATION RATE: 16 BRPM | HEIGHT: 67 IN | SYSTOLIC BLOOD PRESSURE: 104 MMHG | WEIGHT: 170 LBS | HEART RATE: 89 BPM | TEMPERATURE: 97.9 F | DIASTOLIC BLOOD PRESSURE: 66 MMHG | OXYGEN SATURATION: 95 %

## 2019-01-10 DIAGNOSIS — G47.33 OSA (OBSTRUCTIVE SLEEP APNEA): ICD-10-CM

## 2019-01-10 PROBLEM — E66.9 OBESITY (BMI 30-39.9): Status: RESOLVED | Noted: 2017-05-04 | Resolved: 2019-01-10

## 2019-01-10 PROCEDURE — 99214 OFFICE O/P EST MOD 30 MIN: CPT | Performed by: NURSE PRACTITIONER

## 2019-01-10 NOTE — PATIENT INSTRUCTIONS
1) Continue autoCPAP and increase pressure to compensate for low oxygenation per CNOX [id number]  2) Clean mask and supplies weekly and change them as insurance allows - new mask ordered  3) CNOX with updated settings - can call with results  4) Vaccines: Up to date with flu  5) Return in about 2 months (around 3/10/2019) for follow up with ANNABEL Agudelo, review of symptoms, Compliance.

## 2019-01-10 NOTE — PROGRESS NOTES
CC:  Here for f/u sleep issues as listed below    HPI:   Nikki presents today for follow up for TONY and asthma.  Hx of atrial fibrillation, requiring ablation.  History of Hodgkin's disease, breast cancer, aortic valve insufficiency. PFTs from 3/2017 indicate a Fev1 of 1.92L or 64% predicted, Fev1/FVC ratio of 72, DLCO 121%. She has not used rescue inhaler in many months.        She was originally diagnosed in 2015 with mild TONY with an AHI of 20.4 and low oxygenation of 75% she did not pursue treatment at that time and attempted to work on weight loss, but did not lose weight. Given ongoing symptoms she completed PSG 10/2017 which indicated an AHI of 15.1 that increases to 110 while in REM with low oxygenation of 68%.        Currently she is being treated with autoCPAP @ [id number].  Compliance download from the dates 12/11/2018 - 1/9/2019 indicates she is wearing the device 100% for an avg of 4 hours and 37 minutes per night with a reduced AHI of 2.3.  Overnight oximetry completed September 2018 showing time below 88% of 63 minutes with a low of 76%, basal of 89.9%.  She does tolerate pressure, but the mask is not fitting properly. She could tolerate more pressure.  She wakes up refreshed and is less tired throughout the day. She denies morning H/A and sleep better overall. She will continue to clean supplies weekly and change them as insurance allows.             Patient Active Problem List    Diagnosis Date Noted   • TONY (obstructive sleep apnea)      Priority: Medium   • Paroxysmal atrial fibrillation (HCC) 12/22/2016     Priority: Medium   • Nonrheumatic aortic valve insufficiency 05/08/2014     Priority: Medium   • History of breast cancer 05/17/2017     Priority: Low   • S/P radiation therapy - 1990 Mantle Radiation      Priority: Low   • Allergic rhinitis due to allergen 05/08/2014     Priority: Low   • History of Hodgkin's disease 12/30/2013     Priority: Low   • Hypothyroidism 04/04/2013     Priority: Low  "  • Depression 04/04/2013     Priority: Low   • Mild intermittent asthma without complication 04/12/2018   • Cervical lymphadenopathy 12/29/2017   • S/P ablation of atrial fibrillation 12/26/2017   • Elevated LDL cholesterol level 12/22/2017   • Lymphadenopathy, axillary 12/22/2017   • Chronic anticoagulation 08/04/2017       Past Medical History:   Diagnosis Date   • Allergic rhinitis 4/4/2013   • Aortic sclerosis 5/8/2014   • Arrhythmia     \"Skips a beat\"   • Backpain    • Breast cancer, right (HCC) 4/2016   • Breath shortness     \"I have scar tissue in right lung from previous radiation, use my inhaler about once a week\"   • Bronchitis 2012   • Cancer (HCC) 1990    hodgkins, previous chemo, radiation    • Cancer (HCC) 2016    DCIS right breast lumpectomy radiation   • DCIS (ductal carcinoma in situ) 2015   • Depression 4/4/2013   • Endometriosis    • Heart murmur    • Hemorrhagic disorder (HCC)     on Eliquis   • Hodgkin disease (HCC)    • Hypothyroidism 4/4/2013   • Migraine variant 4/4/2013   • Paroxysmal atrial fibrillation (HCC)    • Psychiatric problem    • Sleep apnea     CPAP   • Valvular heart disease     moderate AI        Past Surgical History:   Procedure Laterality Date   • CARPAL TUNNEL RELEASE Right 4/21/2017    Procedure: CARPAL TUNNEL RELEASE;  Surgeon: Shay Powers M.D.;  Location: SURGERY [facility];  Service:    • CARPAL TUNNEL RELEASE Left 3/31/2017    Procedure: CARPAL TUNNEL RELEASE;  Surgeon: Shay Powers M.D.;  Location: SURGERY [facility];  Service:    • BREAST BIOPSY Right 5/25/2016    Procedure: BREAST BIOPSY FOR: RE-EXCISION OF RT BREAST TISSUE ;  Surgeon: Patricia Romero M.D.;  Location: SURGERY SAME DAY [facility];  Service:    • PARTIAL MASTECTOMY Right 5/10/2016    Procedure: PARTIAL MASTECTOMY;  Surgeon: Patricia Romero M.D.;  Location: SURGERY SAME DAY [facility];  Service:    • US-NEEDLE CORE BX-BREAST PANEL         Family History   Problem Relation Age " of Onset   • Stroke Mother    • Hypertension Mother    • Stroke Father    • Hypertension Father    • Cancer Father         prostate cancer   • Diabetes Neg Hx        Social History     Social History   • Marital status: Single     Spouse name: N/A   • Number of children: N/A   • Years of education: N/A     Occupational History   • Not on file.     Social History Main Topics   • Smoking status: Former Smoker     Packs/day: 0.25     Years: 1.00     Types: Cigarettes     Quit date: 3/29/1991   • Smokeless tobacco: Never Used   • Alcohol use No      Comment: rare    • Drug use: No   • Sexual activity: Yes     Birth control/ protection: IUD     Other Topics Concern   • Not on file     Social History Narrative   • No narrative on file       Current Outpatient Prescriptions   Medication Sig Dispense Refill   • levothyroxine (SYNTHROID) 112 MCG Tab TAKE 1 TAB BY MOUTH EVERY MORNING ON AN EMPTY STOMACH. 90 Tab 0   • metoprolol SR (TOPROL XL) 25 MG TABLET SR 24 HR Take 1 Tab by mouth every day. 90 Tab 3   • Cetirizine HCl (ZYRTEC ALLERGY PO) Take  by mouth.     • sertraline (ZOLOFT) 100 MG Tab TAKE 1 TABLET BY MOUTH EVERY MORNING 90 Tab 3   • ELIQUIS 5 MG Tab TAKE 1 TABLET BY MOUTH TWO TIMES A DAY 60 Tab 11   • hydrOXYzine pamoate (VISTARIL) 25 MG Cap Take 25 mg by mouth 3 times a day as needed for Itching.     • omeprazole (PRILOSEC) 20 MG delayed-release capsule Take 1 Cap by mouth every morning before breakfast. (Patient not taking: Reported on 1/10/2019) 30 Cap 0   • sucralfate (CARAFATE) 1 GM Tab Take 1 Tab by mouth 4 Times a Day,Before Meals and at Bedtime. (Patient not taking: Reported on 1/10/2019) 120 Tab 0   • diphenhydrAMINE (BENADRYL) 25 MG Tab Take 25 mg by mouth as needed for Sleep.     • albuterol (VENTOLIN HFA) 108 (90 BASE) MCG/ACT Aero Soln inhalation aerosol Inhale 2 Puffs by mouth every four hours as needed for Shortness of Breath (prn).     • PARAGARD INTRAUTERINE COPPER IUD by Intrauterine route.    "    No current facility-administered medications for this visit.           Allergies: Codeine and Penicillins      ROS   Gen: Denies fever, chills, unintentional weight loss, fatigue  Resp:Denies Dyspnea  CV: Denies chest pain, chest tightness  Sleep:Denies morning headache, insomnia, daytime somnolence, snoring, gasping for air, apnea  Neuro: Denies frequent headaches, weakness, dizziness  See HPI.  All other systems reviewed and negative        Vital signs for this encounter:  Vitals:    01/10/19 1400 01/10/19 1405   Height:  1.702 m (5' 7\")   Weight:  77.1 kg (170 lb)   Weight % change since last entry.:  0 %   BP:  104/66   Pulse:  89   BMI (Calculated):  26.63   Resp:  16   Temp:  36.6 °C (97.9 °F)   TempSrc:  Temporal   O2 sat % room air: 95 %    Neck circumference: 12.5                    Physical Exam:   Gen:         Alert and oriented, No apparent distress.   Neck:        No Lymphadenopathy.  Lungs:     Clear to auscultation bilaterally.    CV:          Regular rate and rhythm. No murmurs, rubs or gallops.   Abd:         Soft non tender, non distended.            Ext:          No clubbing, cyanosis, edema.    Assessment   1. TONY (obstructive sleep apnea)  DME Other    DME CNOX By DME Co    DME Mask Fitting       Patient is clinically stable and will proceed with following plan.     PLAN:   Patient Instructions   1) Continue autoCPAP and increase pressure to compensate for low oxygenation per CNOX [id number]  2) Clean mask and supplies weekly and change them as insurance allows - new mask ordered  3) CNOX with updated settings - can call with results  4) Vaccines: Up to date with flu  5) Return in about 2 months (around 3/10/2019) for follow up with ANNABEL Agudelo, review of symptoms, Compliance.              "

## 2019-01-22 DIAGNOSIS — I48.0 PAROXYSMAL ATRIAL FIBRILLATION (HCC): ICD-10-CM

## 2019-02-05 RX ORDER — LEVOTHYROXINE SODIUM 112 UG/1
112 TABLET ORAL
Qty: 90 TAB | Refills: 3 | Status: SHIPPED | OUTPATIENT
Start: 2019-02-05 | End: 2019-02-06 | Stop reason: SDUPTHER

## 2019-02-06 NOTE — TELEPHONE ENCOUNTER
Was the patient seen in the last year in this department? Yes    Does patient have an active prescription for medications requested? No     Received Request Via: Pharmacy     Looks like the request that was sent yesterday was sent to the [facility] here in [place].. This request is from [facility]

## 2019-02-07 RX ORDER — LEVOTHYROXINE SODIUM 112 UG/1
112 TABLET ORAL
Qty: 90 TAB | Refills: 1 | Status: SHIPPED | OUTPATIENT
Start: 2019-02-07

## 2019-02-11 ENCOUNTER — TELEPHONE (OUTPATIENT)
Dept: SLEEP MEDICINE | Facility: MEDICAL CENTER | Age: 49
End: 2019-02-11

## 2019-02-11 NOTE — TELEPHONE ENCOUNTER
[facility] LVM at the [facility] stating she was seen about a month ago and the pressure on her machine was increase and she was changing her mask as well. The pt stated she has an apt with preferred for a mask fitting so she can go back to her old mask but she was wondering if the pressure on her machine could be lowered as well. Pt stated that the air is blowing in her face and like she is suffocating.     Waiting on compliance from preferred.

## 2019-03-05 ENCOUNTER — OFFICE VISIT (OUTPATIENT)
Dept: CARDIOLOGY | Facility: MEDICAL CENTER | Age: 49
End: 2019-03-05
Payer: COMMERCIAL

## 2019-03-05 VITALS
BODY MASS INDEX: 26.06 KG/M2 | WEIGHT: 166 LBS | SYSTOLIC BLOOD PRESSURE: 112 MMHG | HEIGHT: 67 IN | OXYGEN SATURATION: 96 % | HEART RATE: 88 BPM | DIASTOLIC BLOOD PRESSURE: 64 MMHG

## 2019-03-05 DIAGNOSIS — I35.1 NONRHEUMATIC AORTIC VALVE INSUFFICIENCY: ICD-10-CM

## 2019-03-05 DIAGNOSIS — I48.0 PAROXYSMAL ATRIAL FIBRILLATION (HCC): ICD-10-CM

## 2019-03-05 DIAGNOSIS — Z86.79 S/P ABLATION OF ATRIAL FIBRILLATION: ICD-10-CM

## 2019-03-05 DIAGNOSIS — Z92.3 S/P RADIATION THERAPY: Chronic | ICD-10-CM

## 2019-03-05 DIAGNOSIS — Z79.01 CHRONIC ANTICOAGULATION: ICD-10-CM

## 2019-03-05 DIAGNOSIS — Z98.890 S/P ABLATION OF ATRIAL FIBRILLATION: ICD-10-CM

## 2019-03-05 PROCEDURE — 99214 OFFICE O/P EST MOD 30 MIN: CPT | Performed by: INTERNAL MEDICINE

## 2019-03-05 RX ORDER — SODIUM, POTASSIUM,MAG SULFATES 17.5-3.13G
SOLUTION, RECONSTITUTED, ORAL ORAL
COMMUNITY
Start: 2019-03-02 | End: 2019-03-05

## 2019-03-06 ENCOUNTER — SLEEP CENTER VISIT (OUTPATIENT)
Dept: SLEEP MEDICINE | Facility: MEDICAL CENTER | Age: 49
End: 2019-03-06
Payer: COMMERCIAL

## 2019-03-06 VITALS
OXYGEN SATURATION: 100 % | BODY MASS INDEX: 26.06 KG/M2 | HEIGHT: 67 IN | SYSTOLIC BLOOD PRESSURE: 124 MMHG | TEMPERATURE: 98.8 F | DIASTOLIC BLOOD PRESSURE: 86 MMHG | RESPIRATION RATE: 16 BRPM | WEIGHT: 166 LBS | HEART RATE: 74 BPM

## 2019-03-06 DIAGNOSIS — G47.33 OSA (OBSTRUCTIVE SLEEP APNEA): ICD-10-CM

## 2019-03-06 DIAGNOSIS — G47.34 NOCTURNAL HYPOXIA: ICD-10-CM

## 2019-03-06 DIAGNOSIS — I48.0 PAROXYSMAL ATRIAL FIBRILLATION (HCC): ICD-10-CM

## 2019-03-06 PROCEDURE — 99214 OFFICE O/P EST MOD 30 MIN: CPT | Performed by: NURSE PRACTITIONER

## 2019-03-06 NOTE — PROGRESS NOTES
CC:  Here for f/u sleep issues as listed below    HPI:   Nikki presents today for follow up for TONY and asthma.  Hx of atrial fibrillation, requiring ablation.  History of Hodgkin's disease, breast cancer, depression, aortic valve insufficiency. PFTs from 3/2017 indicate a Fev1 of 1.92L or 64% predicted, Fev1/FVC ratio of 72, DLCO 121%. She has not used rescue inhaler in many months.        She was originally diagnosed in 2015 with mild TONY with an AHI of 20.4 and low oxygenation of 75% she did not pursue treatment at that time and attempted to work on weight loss, but did not lose weight. Given ongoing symptoms she completed PSG 10/2017 which indicated an AHI of 15.1 that increases to 110 while in REM with low oxygenation of 68%.        Currently she is being treated with autoCPAP @ [id number].  Compliance download from the dates 2/4/2019 - 3/5/2019 indicates she is wearing the device 96.7% for an avg of 4 hours and 59 minutes per night with a reduced AHI of 3.0.  Overnight oximetry completed September 2018 showing time below 88% of 63 minutes with a low of 76%, basal of 89.9%.   She does not tolerate pressure, and the mask is not fitting, possibly due to elevated pressures. She wakes up refreshed and is less tired throughout the day. She has morning H/A and sleep better overall. She will continue to clean supplies weekly and change them as insurance allows.                 Patient Active Problem List    Diagnosis Date Noted   • TONY (obstructive sleep apnea)      Priority: Medium   • Paroxysmal atrial fibrillation (HCC) 12/22/2016     Priority: Medium   • Nonrheumatic aortic valve insufficiency 05/08/2014     Priority: Medium   • History of breast cancer 05/17/2017     Priority: Low   • S/P radiation therapy - 1990 Mantle Radiation      Priority: Low   • Allergic rhinitis due to allergen 05/08/2014     Priority: Low   • History of Hodgkin's disease 12/30/2013     Priority: Low   • Hypothyroidism 04/04/2013      "Priority: Low   • Depression 04/04/2013     Priority: Low   • Nocturnal hypoxia 03/06/2019   • Mild intermittent asthma without complication 04/12/2018   • Cervical lymphadenopathy 12/29/2017   • S/P ablation of atrial fibrillation 12/26/2017   • Elevated LDL cholesterol level 12/22/2017   • Lymphadenopathy, axillary 12/22/2017   • Chronic anticoagulation 08/04/2017       Past Medical History:   Diagnosis Date   • Allergic rhinitis 4/4/2013   • Aortic sclerosis 5/8/2014   • Arrhythmia     \"Skips a beat\"   • Backpain    • Breast cancer, right (HCC) 4/2016   • Breath shortness     \"I have scar tissue in right lung from previous radiation, use my inhaler about once a week\"   • Bronchitis 2012   • Cancer (HCC) 1990    hodgkins, previous chemo, radiation    • Cancer (HCC) 2016    DCIS right breast lumpectomy radiation   • DCIS (ductal carcinoma in situ) 2015   • Depression 4/4/2013   • Endometriosis    • Heart murmur    • Hemorrhagic disorder (HCC)     on Eliquis   • Hodgkin disease (HCC)    • Hypothyroidism 4/4/2013   • Migraine variant 4/4/2013   • Paroxysmal atrial fibrillation (HCC)    • Psychiatric problem    • Sleep apnea     CPAP   • Valvular heart disease     moderate AI        Past Surgical History:   Procedure Laterality Date   • CARPAL TUNNEL RELEASE Right 4/21/2017    Procedure: CARPAL TUNNEL RELEASE;  Surgeon: Shay Powers M.D.;  Location: SURGERY [facility];  Service:    • CARPAL TUNNEL RELEASE Left 3/31/2017    Procedure: CARPAL TUNNEL RELEASE;  Surgeon: Shay Powers M.D.;  Location: SURGERY [facility];  Service:    • BREAST BIOPSY Right 5/25/2016    Procedure: BREAST BIOPSY FOR: RE-EXCISION OF RT BREAST TISSUE ;  Surgeon: Patricia Romero M.D.;  Location: SURGERY SAME DAY [facility];  Service:    • PARTIAL MASTECTOMY Right 5/10/2016    Procedure: PARTIAL MASTECTOMY;  Surgeon: Patricia Romero M.D.;  Location: SURGERY SAME DAY [facility];  Service:    • US-NEEDLE CORE BX-BREAST PANEL  "        Family History   Problem Relation Age of Onset   • Stroke Mother    • Hypertension Mother    • Stroke Father    • Hypertension Father    • Cancer Father         prostate cancer   • Diabetes Neg Hx        Social History     Social History   • Marital status: Single     Spouse name: N/A   • Number of children: N/A   • Years of education: N/A     Occupational History   • Not on file.     Social History Main Topics   • Smoking status: Former Smoker     Packs/day: 0.25     Years: 1.00     Types: Cigarettes     Quit date: 3/29/1991   • Smokeless tobacco: Never Used   • Alcohol use No      Comment: rare    • Drug use: No   • Sexual activity: Yes     Birth control/ protection: IUD     Other Topics Concern   • Not on file     Social History Narrative   • No narrative on file       Current Outpatient Prescriptions   Medication Sig Dispense Refill   • apixaban (ELIQUIS) 5mg Tab Take 1 Tab by mouth 2 Times a Day. 180 Tab 3   • levothyroxine (SYNTHROID) 112 MCG Tab Take 1 Tab by mouth Every morning on an empty stomach. 90 Tab 1   • metoprolol SR (TOPROL XL) 25 MG TABLET SR 24 HR Take 1 Tab by mouth every day. 90 Tab 3   • Cetirizine HCl (ZYRTEC ALLERGY PO) Take  by mouth.     • sertraline (ZOLOFT) 100 MG Tab TAKE 1 TABLET BY MOUTH EVERY MORNING 90 Tab 3   • diphenhydrAMINE (BENADRYL) 25 MG Tab Take 25 mg by mouth as needed for Sleep.     • albuterol (VENTOLIN HFA) 108 (90 BASE) MCG/ACT Aero Soln inhalation aerosol Inhale 2 Puffs by mouth every four hours as needed for Shortness of Breath (prn).       No current facility-administered medications for this visit.           Allergies: Codeine and Penicillins      ROS   Gen: Denies fever, chills, unintentional weight loss, fatigue  Resp:Denies Dyspnea  CV: Denies chest pain, chest tightness  Sleep:Denies insomnia, daytime somnolence, snoring, gasping for air, apnea  Neuro: Denies frequent headaches, weakness, dizziness  See HPI.  All other systems reviewed and  "negative        Vital signs for this encounter:  Vitals:    03/06/19 1500 03/06/19 1539   Height:  1.702 m (5' 7\")   Weight:  75.3 kg (166 lb)   Weight % change since last entry.:  0 %   BP:  124/86   Pulse:  74   BMI (Calculated):  26   Resp:  16   Temp:  37.1 °C (98.8 °F)   TempSrc:  Temporal   O2 sat % room air: 100 %                    Physical Exam:   Gen:         Alert and oriented, No apparent distress.   Neck:        No Lymphadenopathy.  Lungs:     Clear to auscultation bilaterally.    CV:          Regular rate and rhythm. No murmurs, rubs or gallops.   Abd:         Soft non tender, non distended.            Ext:          No clubbing, cyanosis, edema.    Assessment   1. TONY (obstructive sleep apnea)  DME O2 New Set Up   2. Paroxysmal atrial fibrillation (HCC)     3. Nocturnal hypoxia         Patient is clinically stable and will proceed with following plan. We attempted to increase pressures to compensate for low oxygenation from overnight ox September 2018 but she did not tolerate.  We did update overnight ox on pressures between 10-16 with improved but not sufficient oxygenation.  Given we will be decreasing her pressure she understands the importance of adding oxygen especially with her history of atrial fibrillation and daily morning headaches for the last 6 months.    PLAN:   Patient Instructions   1) Continue autoCPAP and decrease pressure [id number]  2) Clean mask and supplies weekly and change them as insurance allows - new mask ordered  3) Start nocturnal oxygen at 2L with CPAP  4) Vaccines: Up to date with flu  5) Return in about 2 months (around 5/6/2019) for follow up with ANNABEL Agudelo, Compliance, review of symptoms, if not sooner.        "

## 2019-03-06 NOTE — PATIENT INSTRUCTIONS
1) Continue autoCPAP and decrease pressure [id number]  2) Clean mask and supplies weekly and change them as insurance allows - new mask ordered  3) Start nocturnal oxygen at 2L with CPAP  4) Vaccines: Up to date with flu  5) Return in about 2 months (around 5/6/2019) for follow up with ANNABEL Agudelo, Compliance, review of symptoms, if not sooner.

## 2019-03-20 ASSESSMENT — ENCOUNTER SYMPTOMS
FOCAL WEAKNESS: 0
CLAUDICATION: 0
ABDOMINAL PAIN: 0
WEAKNESS: 0
SORE THROAT: 0
SHORTNESS OF BREATH: 0
PALPITATIONS: 0
BLURRED VISION: 0
BRUISES/BLEEDS EASILY: 0
FALLS: 0
NAUSEA: 0
DIZZINESS: 0
FEVER: 0
COUGH: 0
CHILLS: 0
PND: 0

## 2019-03-21 NOTE — PROGRESS NOTES
"Chief Complaint   Patient presents with   • Atrial Fibrillation       Subjective:   Nikki Aponte is a 48 y.o. female who presents today for follow-up of her history of atrial fibrillation with history of chest radiation      She had followed up with EP service about her long-term antiarrhythmics and is just on anticoagulation      Past Medical History:   Diagnosis Date   • Allergic rhinitis 4/4/2013   • Aortic sclerosis 5/8/2014   • Arrhythmia     \"Skips a beat\"   • Backpain    • Breast cancer, right (HCC) 4/2016   • Breath shortness     \"I have scar tissue in right lung from previous radiation, use my inhaler about once a week\"   • Bronchitis 2012   • Cancer (HCC) 1990    hodgkins, previous chemo, radiation    • Cancer (HCC) 2016    DCIS right breast lumpectomy radiation   • DCIS (ductal carcinoma in situ) 2015   • Depression 4/4/2013   • Endometriosis    • Heart murmur    • Hemorrhagic disorder (HCC)     on Eliquis   • Hodgkin disease (HCC)    • Hypothyroidism 4/4/2013   • Migraine variant 4/4/2013   • Paroxysmal atrial fibrillation (HCC)    • Psychiatric problem    • Sleep apnea     CPAP   • Valvular heart disease     moderate AI      Past Surgical History:   Procedure Laterality Date   • CARPAL TUNNEL RELEASE Right 4/21/2017    Procedure: CARPAL TUNNEL RELEASE;  Surgeon: Shay Powers M.D.;  Location: SURGERY [facility];  Service:    • CARPAL TUNNEL RELEASE Left 3/31/2017    Procedure: CARPAL TUNNEL RELEASE;  Surgeon: Shay Powers M.D.;  Location: SURGERY [facility];  Service:    • BREAST BIOPSY Right 5/25/2016    Procedure: BREAST BIOPSY FOR: RE-EXCISION OF RT BREAST TISSUE ;  Surgeon: Patricia Romero M.D.;  Location: SURGERY SAME DAY [facility];  Service:    • PARTIAL MASTECTOMY Right 5/10/2016    Procedure: PARTIAL MASTECTOMY;  Surgeon: Patricia Romero M.D.;  Location: SURGERY SAME DAY [facility];  Service:    • US-NEEDLE CORE BX-BREAST PANEL       Family History   Problem Relation " Age of Onset   • Stroke Mother    • Hypertension Mother    • Stroke Father    • Hypertension Father    • Cancer Father         prostate cancer   • Diabetes Neg Hx      Social History     Social History   • Marital status: Single     Spouse name: N/A   • Number of children: N/A   • Years of education: N/A     Occupational History   • Not on file.     Social History Main Topics   • Smoking status: Former Smoker     Packs/day: 0.25     Years: 1.00     Types: Cigarettes     Quit date: 3/29/1991   • Smokeless tobacco: Never Used   • Alcohol use No      Comment: rare    • Drug use: No   • Sexual activity: Yes     Birth control/ protection: IUD     Other Topics Concern   • Not on file     Social History Narrative   • No narrative on file     Allergies   Allergen Reactions   • Codeine Itching   • Penicillins      States that it does not treat anything when she has been on it.      Outpatient Encounter Prescriptions as of 3/5/2019   Medication Sig Dispense Refill   • apixaban (ELIQUIS) 5mg Tab Take 1 Tab by mouth 2 Times a Day. 180 Tab 3   • levothyroxine (SYNTHROID) 112 MCG Tab Take 1 Tab by mouth Every morning on an empty stomach. 90 Tab 1   • metoprolol SR (TOPROL XL) 25 MG TABLET SR 24 HR Take 1 Tab by mouth every day. 90 Tab 3   • Cetirizine HCl (ZYRTEC ALLERGY PO) Take  by mouth.     • sertraline (ZOLOFT) 100 MG Tab TAKE 1 TABLET BY MOUTH EVERY MORNING 90 Tab 3   • diphenhydrAMINE (BENADRYL) 25 MG Tab Take 25 mg by mouth as needed for Sleep.     • albuterol (VENTOLIN HFA) 108 (90 BASE) MCG/ACT Aero Soln inhalation aerosol Inhale 2 Puffs by mouth every four hours as needed for Shortness of Breath (prn).     • [DISCONTINUED] SUPREP BOWEL PREP KIT 17.5-3.13-1.6 GM/180ML Solution      • [DISCONTINUED] apixaban (ELIQUIS) 5mg Tab Take 1 Tab by mouth 2 Times a Day. 180 Tab 3   • [DISCONTINUED] apixaban (ELIQUIS) 5mg Tab Take 1 Tab by mouth 2 Times a Day. Needs to be seen for further refills. Thank you 180 Tab 0   •  "[DISCONTINUED] hydrOXYzine pamoate (VISTARIL) 25 MG Cap Take 25 mg by mouth 3 times a day as needed for Itching.     • [DISCONTINUED] omeprazole (PRILOSEC) 20 MG delayed-release capsule Take 1 Cap by mouth every morning before breakfast. (Patient not taking: Reported on 1/10/2019) 30 Cap 0   • [DISCONTINUED] sucralfate (CARAFATE) 1 GM Tab Take 1 Tab by mouth 4 Times a Day,Before Meals and at Bedtime. (Patient not taking: Reported on 1/10/2019) 120 Tab 0   • [DISCONTINUED] PARAGARD INTRAUTERINE COPPER IUD by Intrauterine route.       No facility-administered encounter medications on file as of 3/5/2019.      Review of Systems   Constitutional: Negative for chills and fever.   HENT: Negative for sore throat.    Eyes: Negative for blurred vision.   Respiratory: Negative for cough and shortness of breath.    Cardiovascular: Negative for chest pain, palpitations, claudication, leg swelling and PND.   Gastrointestinal: Negative for abdominal pain and nausea.   Musculoskeletal: Negative for falls and joint pain.   Skin: Negative for rash.   Neurological: Negative for dizziness, focal weakness and weakness.   Endo/Heme/Allergies: Does not bruise/bleed easily.        Objective:   /64 (BP Location: Left arm, Patient Position: Sitting, BP Cuff Size: Adult)   Pulse 88   Ht 1.702 m (5' 7\")   Wt 75.3 kg (166 lb)   SpO2 96%   BMI 26.00 kg/m²     Physical Exam   Constitutional: No distress.   HENT:   Mouth/Throat: Oropharynx is clear and moist. No oropharyngeal exudate.   Eyes: No scleral icterus.   Neck: No JVD present.   Cardiovascular: Normal rate and normal heart sounds.  Exam reveals no gallop and no friction rub.    No murmur heard.  Pulmonary/Chest: No respiratory distress. She has no wheezes. She has no rales.   Abdominal: Soft. Bowel sounds are normal.   Musculoskeletal: She exhibits no edema.   Neurological: She is alert.   Skin: No rash noted. She is not diaphoretic.   Psychiatric: She has a normal mood and " affect.       Assessment:     1. Chronic anticoagulation     2. Nonrheumatic aortic valve insufficiency     3. Paroxysmal atrial fibrillation (HCC)  apixaban (ELIQUIS) 5mg Tab    DISCONTINUED: apixaban (ELIQUIS) 5mg Tab   4. S/P ablation of atrial fibrillation     5. S/P radiation therapy - 1990 Mantle Radiation         Medical Decision Making:  Today's Assessment / Status / Plan:     It was my pleasure to meet with Ms. Aponte.    Blood pressure is well controlled.      She understands the risks and benefits of chronic anticoagulation  She Monitor for palpitations and certainly let us know    I will see Ms. Aponte back in 1 year time and encouraged her to follow up with us over the phone or e-mail using my [employer] as issues arise.    It is my pleasure to participate in the care of Ms. Aponte.  Please do not hesitate to contact me with questions or concerns.    Sebas Sharma MD PhD [facility]  Cardiologist [facility]    Please note that this dictation was created using voice recognition software. I have worked with consultants from the vendor as well as technical experts from [facility] to optimize the interface. I have made every reasonable attempt to correct obvious errors, but I expect that there are errors of grammar and possibly content I did not discover before finalizing the note.

## 2019-03-25 NOTE — MR AVS SNAPSHOT
[facility]  [address] [place] [address]. [place] [address]    March 26, 2019    Yair BRO Crosser  [address] [facility]  [place] [address]          Dear Yair,    Diabetic test is Excellent   Normal Urine   Prostate test is normal   Electrolytes and kidney tests are normal.    Enclosed is a copy of your results.     Results for orders placed or performed in visit on 03/25/19   BASIC METABOLIC PANEL   Result Value Ref Range    Sodium 141 133 - 144 mmol/L    Potassium 3.7 3.4 - 5.3 mmol/L    Chloride 104 94 - 109 mmol/L    Carbon Dioxide 26 20 - 32 mmol/L    Anion Gap 11 3 - 14 mmol/L    Glucose 113 (H) 70 - 99 mg/dL    Urea Nitrogen 11 7 - 30 mg/dL    Creatinine 0.92 0.66 - 1.25 mg/dL    GFR Estimate 89 >60 mL/min/[1.73_m2]    GFR Estimate If Black >90 >60 mL/min/[1.73_m2]    Calcium 9.5 8.5 - 10.1 mg/dL   HEMOGLOBIN A1C   Result Value Ref Range    Hemoglobin A1C 6.0 (H) 0 - 5.6 %   Albumin Random Urine Quantitative with Creat Ratio   Result Value Ref Range    Creatinine Urine 33 mg/dL    Albumin Urine mg/L <5 mg/L    Albumin Urine mg/g Cr Unable to calculate due to low value 0 - 17 mg/g Cr   PSA, screen   Result Value Ref Range    PSA 0.60 0 - 4 ug/L       If you have any questions or concerns, please call myself or my nurse at [phone].      Sincerely,        Radha Villegas MD/rita   "        Nikki Aponte   2017 7:40 AM   Office Visit   MRN: [id number]    Department:  [facility]   Dept Phone:  [phone]    Description:  Female : 1970   Provider:  SAVANAH Malagon           Reason for Visit     Cough x 1 week     Fever x 1 week       Allergies as of 2017     Allergen Noted Reactions    Codeine 2010   Itching    Penicillins 2014       States that it does not treat anything when she has been on it.       You were diagnosed with     Acute suppurative otitis media of both ears without spontaneous rupture of tympanic membranes, recurrence not specified   [[id number]]         Vital Signs     Blood Pressure Pulse Temperature Respirations Height Weight    142/80 mmHg 83 36.6 °C (97.8 °F) 16 1.676 m (5' 6\") 85.276 kg (188 lb)    Body Mass Index Oxygen Saturation Last Menstrual Period Breastfeeding? Smoking Status       30.36 kg/m2 93% 2016 No Never Smoker        Basic Information     Date Of Birth Sex Race Ethnicity Preferred Language    1970 Female  Non- English      Your appointments     2017  3:20 PM   Established Patient with Ruddy Mansfield M.D.   [facility] (--)    [address]  [address]  [facility] [address] [phone]           You will be receiving a confirmation call a few days before your appointment from our automated call confirmation system.              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.11, D05.12   5/10/2016 - Present    S/P radiation therapy -  Mantle Radiation (Chronic) Z92.3   Unknown - Present    History of palpitations in " adulthood Z86.79   12/22/2016 - Present    Bilateral acute suppurative otitis media H66.003   1/5/2017 - Present      Health Maintenance        Date Due Completion Dates    MAMMOGRAM 7/8/2017 7/8/2016, 5/10/2016, 5/10/2016, 4/14/2016, 3/31/2016, 1/10/2014, 1/9/2014, 1/9/2014    PAP SMEAR 3/16/2018 3/16/2015 (Postponed)    Override on 3/16/2015: Postponed    IMM DTaP/Tdap/Td Vaccine (2 - Td) 12/22/2026 12/22/2016            Current Immunizations     Influenza Vaccine Quad Inj (Pf) 12/22/2016    Tdap Vaccine 12/22/2016      Below and/or attached are the medications your provider expects you to take. Review all of your home medications and newly ordered medications with your provider and/or pharmacist. Follow medication instructions as directed by your provider and/or pharmacist. Please keep your medication list with you and share with your provider. Update the information when medications are discontinued, doses are changed, or new medications (including over-the-counter products) are added; and carry medication information at all times in the event of emergency situations     Allergies:  CODEINE - Itching     PENICILLINS - (reactions not documented)               Medications  Valid as of: January 09, 2017 -  8:04 AM    Generic Name Brand Name Tablet Size Instructions for use    Albuterol Sulfate (Aero Soln) albuterol 108 (90 BASE) MCG/ACT Inhale 2 Puffs by mouth every 6 hours as needed for Shortness of Breath.        Benzonatate (Cap) TESSALON 100 MG Take 1 Cap by mouth 3 times a day as needed for Cough.        Cefdinir (Cap) OMNICEF 300 MG Take 1 Cap by mouth 2 times a day.        Copper (IUD) PARAGARD INTRAUTERINE COPPER  by Intrauterine route.        DiphenhydrAMINE HCl (Tab) BENADRYL 25 MG Take 25 mg by mouth as needed for Sleep.        Fluticasone Propionate (Suspension) FLONASE 50 MCG/ACT Spray 2 Sprays in nose every day.        Hydrocodone-Chlorpheniramine (Solution) Hydrocodone-Chlorpheniramine 5-4 MG/5ML  Take 5 mL by mouth 2 times a day as needed.        Levothyroxine Sodium (Tab) SYNTHROID 100 MCG Take 1 Tab by mouth Every morning on an empty stomach.        Sertraline HCl (Tab) ZOLOFT 100 MG Take 1 Tab by mouth every morning.        .                 Medicines prescribed today were sent to:     [id number] - [place] - [address] [place] [address]    Phone: [phone] Fax: [phone]    Open 24 Hours?: No      Medication refill instructions:       If your prescription bottle indicates you have medication refills left, it is not necessary to call your provider’s office. Please contact your pharmacy and they will refill your medication.    If your prescription bottle indicates you do not have any refills left, you may request refills at any time through one of the following ways: The online [employer] system (except Urgent Care), by calling your provider’s office, or by asking your pharmacy to contact your provider’s office with a refill request. Medication refills are processed only during regular business hours and may not be available until the next business day. Your provider may request additional information or to have a follow-up visit with you prior to refilling your medication.   *Please Note: Medication refills are assigned a new Rx number when refilled electronically. Your pharmacy may indicate that no refills were authorized even though a new prescription for the same medication is available at the pharmacy. Please request the medicine by name with the pharmacy before contacting your provider for a refill.           [employer] Access Code: Activation code not generated  Current [employer] Status: Active

## 2019-04-08 ENCOUNTER — TELEPHONE (OUTPATIENT)
Dept: PULMONOLOGY | Facility: HOSPICE | Age: 49
End: 2019-04-08

## 2019-04-08 DIAGNOSIS — G47.33 OSA (OBSTRUCTIVE SLEEP APNEA): ICD-10-CM

## 2019-04-08 NOTE — TELEPHONE ENCOUNTER
Pt called states contact [facility] for mask she had before. Informed will need to put in order for supplies patient states uses dream wear nasal mask would like order faxed to [facility].     Last seen 3/6/19 ANNABEL [place]   PLAN:   Patient Instructions   1) Continue autoCPAP and decrease pressure [id number]  2) Clean mask and supplies weekly and change them as insurance allows - new mask ordered  3) Start nocturnal oxygen at 2L with CPAP  4) Vaccines: Up to date with flu  5) Return in about 2 months (around 5/6/2019) for follow up with ANNABEL Agudelo, Compliance, review of symptoms, if not sooner.    NEXT OV 5/8/19 ANNABEL Angel

## 2019-04-10 NOTE — TELEPHONE ENCOUNTER
Order, demo, last oV faxed to [facility]     LVM: informed order placed and faxed to [facility].

## 2019-04-19 RX ORDER — SERTRALINE HYDROCHLORIDE 100 MG/1
TABLET, FILM COATED ORAL
Qty: 90 TAB | Refills: 2 | Status: SHIPPED | OUTPATIENT
Start: 2019-04-19

## 2019-05-07 DIAGNOSIS — I10 ESSENTIAL HYPERTENSION: ICD-10-CM

## 2019-05-07 RX ORDER — METOPROLOL SUCCINATE 25 MG/1
25 TABLET, EXTENDED RELEASE ORAL DAILY
Qty: 90 TAB | Refills: 3 | Status: SHIPPED | OUTPATIENT
Start: 2019-05-07

## 2019-05-08 ENCOUNTER — SLEEP CENTER VISIT (OUTPATIENT)
Dept: SLEEP MEDICINE | Facility: MEDICAL CENTER | Age: 49
End: 2019-05-08
Payer: COMMERCIAL

## 2019-05-08 VITALS
SYSTOLIC BLOOD PRESSURE: 110 MMHG | DIASTOLIC BLOOD PRESSURE: 70 MMHG | HEIGHT: 67 IN | HEART RATE: 93 BPM | WEIGHT: 153 LBS | OXYGEN SATURATION: 94 % | BODY MASS INDEX: 24.01 KG/M2 | RESPIRATION RATE: 16 BRPM

## 2019-05-08 DIAGNOSIS — G47.33 OSA (OBSTRUCTIVE SLEEP APNEA): ICD-10-CM

## 2019-05-08 PROCEDURE — 99214 OFFICE O/P EST MOD 30 MIN: CPT | Performed by: NURSE PRACTITIONER

## 2019-05-08 NOTE — PROGRESS NOTES
CC:  Here for f/u sleep issues as listed below    HPI:   Nikki presents today for follow up for TONY and asthma.  Hx of atrial fibrillation, requiring ablation.  History of Hodgkin's disease, breast cancer, depression, aortic valve insufficiency. She travels back and forth from [facility]. PFTs from 3/2017 indicate a Fev1 of 1.92L or 64% predicted, Fev1/FVC ratio of 72, DLCO 121%. She has not used rescue inhaler in many months.        She was originally diagnosed in 2015 with mild TONY with an AHI of 20.4 and low oxygenation of 75% she did not pursue treatment at that time and attempted to work on weight loss, but did not lose weight. Given ongoing symptoms she completed PSG 10/2017 which indicated an AHI of 15.1 that increases to 110 while in REM with low oxygenation of 68%.        Currently she is being treated with autoCPAP @ [id number] with 2L bleed in, adjusted last OV without benefit. She now feels she could take a little more pressure. 2 liter bleed in beneficial without morning H/A now. Compliance download from the dates 4/8/2019 - 5/7/2019 indicates she is wearing the device 100% for an avg of 4 hours and 19 minutes per night with a reduced AHI of 2.0.   She wakes up refreshed and is less tired throughout the day. Naps daily for 15 minutes the last 3 weeks.  Admits she had difficulty going to bed, 1-3am in the morning She has morning H/A and sleep better overall. She will continue to clean supplies weekly and change them as insurance allows.        Patient Active Problem List    Diagnosis Date Noted   • TONY (obstructive sleep apnea)      Priority: Medium   • Paroxysmal atrial fibrillation (HCC) 12/22/2016     Priority: Medium   • Nonrheumatic aortic valve insufficiency 05/08/2014     Priority: Medium   • History of breast cancer 05/17/2017     Priority: Low   • S/P radiation therapy - 1990 Mantle Radiation      Priority: Low   • Allergic rhinitis due to allergen 05/08/2014     Priority: Low   • History of  "Hodgkin's disease 12/30/2013     Priority: Low   • Hypothyroidism 04/04/2013     Priority: Low   • Depression 04/04/2013     Priority: Low   • Nocturnal hypoxia 03/06/2019   • Mild intermittent asthma without complication 04/12/2018   • Cervical lymphadenopathy 12/29/2017   • S/P ablation of atrial fibrillation 12/26/2017   • Elevated LDL cholesterol level 12/22/2017   • Lymphadenopathy, axillary 12/22/2017   • Chronic anticoagulation 08/04/2017       Past Medical History:   Diagnosis Date   • Allergic rhinitis 4/4/2013   • Aortic sclerosis 5/8/2014   • Arrhythmia     \"Skips a beat\"   • Backpain    • Breast cancer, right (HCC) 4/2016   • Breath shortness     \"I have scar tissue in right lung from previous radiation, use my inhaler about once a week\"   • Bronchitis 2012   • Cancer (HCC) 1990    hodgkins, previous chemo, radiation    • Cancer (HCC) 2016    DCIS right breast lumpectomy radiation   • DCIS (ductal carcinoma in situ) 2015   • Depression 4/4/2013   • Endometriosis    • Heart murmur    • Hemorrhagic disorder (HCC)     on Eliquis   • Hodgkin disease (HCC)    • Hypothyroidism 4/4/2013   • Migraine variant 4/4/2013   • Paroxysmal atrial fibrillation (HCC)    • Psychiatric problem    • Sleep apnea     CPAP   • Valvular heart disease     moderate AI        Past Surgical History:   Procedure Laterality Date   • CARPAL TUNNEL RELEASE Right 4/21/2017    Procedure: CARPAL TUNNEL RELEASE;  Surgeon: Shay Powers M.D.;  Location: SURGERY [facility];  Service:    • CARPAL TUNNEL RELEASE Left 3/31/2017    Procedure: CARPAL TUNNEL RELEASE;  Surgeon: Shay Powers M.D.;  Location: SURGERY [facility];  Service:    • BREAST BIOPSY Right 5/25/2016    Procedure: BREAST BIOPSY FOR: RE-EXCISION OF RT BREAST TISSUE ;  Surgeon: Patricia Romero M.D.;  Location: SURGERY SAME DAY [facility];  Service:    • PARTIAL MASTECTOMY Right 5/10/2016    Procedure: PARTIAL MASTECTOMY;  Surgeon: Patricia Romero M.D.;  " Location: SURGERY SAME DAY [facility];  Service:    • US-NEEDLE CORE BX-BREAST PANEL         Family History   Problem Relation Age of Onset   • Stroke Mother    • Hypertension Mother    • Stroke Father    • Hypertension Father    • Cancer Father         prostate cancer   • Diabetes Neg Hx        Social History     Social History   • Marital status: Single     Spouse name: N/A   • Number of children: N/A   • Years of education: N/A     Occupational History   • Not on file.     Social History Main Topics   • Smoking status: Former Smoker     Packs/day: 0.25     Years: 1.00     Types: Cigarettes     Quit date: 3/29/1991   • Smokeless tobacco: Never Used   • Alcohol use No      Comment: rare    • Drug use: No   • Sexual activity: Yes     Birth control/ protection: IUD     Other Topics Concern   • Not on file     Social History Narrative   • No narrative on file       Current Outpatient Prescriptions   Medication Sig Dispense Refill   • metoprolol SR (TOPROL XL) 25 MG TABLET SR 24 HR Take 1 Tab by mouth every day. 90 Tab 3   • sertraline (ZOLOFT) 100 MG Tab TAKE 1 TABLET BY MOUTH EVERY MORNING 90 Tab 2   • apixaban (ELIQUIS) 5mg Tab Take 1 Tab by mouth 2 Times a Day. 180 Tab 3   • levothyroxine (SYNTHROID) 112 MCG Tab Take 1 Tab by mouth Every morning on an empty stomach. 90 Tab 1   • Cetirizine HCl (ZYRTEC ALLERGY PO) Take  by mouth.     • diphenhydrAMINE (BENADRYL) 25 MG Tab Take 25 mg by mouth as needed for Sleep.     • albuterol (VENTOLIN HFA) 108 (90 BASE) MCG/ACT Aero Soln inhalation aerosol Inhale 2 Puffs by mouth every four hours as needed for Shortness of Breath (prn).       No current facility-administered medications for this visit.           Allergies: Codeine and Penicillins      ROS   Gen: Denies fever, chills, unintentional weight loss, fatigue  Resp:Denies Dyspnea  CV: Denies chest pain, chest tightness  Sleep:Denies morning headache, insomnia, daytime somnolence, snoring, gasping for air, apnea  Neuro:  "Denies frequent headaches, weakness, dizziness  See HPI.  All other systems reviewed and negative        Vital signs for this encounter:  Vitals:    05/08/19 1542   Height: 1.702 m (5' 7\")   Weight: 69.4 kg (153 lb)   Weight % change since last entry.: 0 %   BP: 110/70   Pulse: 93   BMI (Calculated): 23.96   Resp: 16                   Physical Exam:   Gen:         Alert and oriented, No apparent distress.   Neck:        No Lymphadenopathy.  Lungs:     Clear to auscultation bilaterally.    CV:          Regular rate and rhythm. No murmurs, rubs or gallops.   Abd:         Soft non tender, non distended.            Ext:          No clubbing, cyanosis, edema.    Assessment   1. TONY (obstructive sleep apnea)  DME Other    CANCELED: DME Other       Patient is clinically stable and will proceed with following plan. Will update overnight ox once acclimated to a set pressure.     PLAN:   Patient Instructions   1) Continue autoCPAP and decrease pressure [id number] with 2L bleed in  2) Clean mask and supplies weekly and change them as insurance allows   3) Vaccines: Up to date with flu  4) Continue smoking cessation   5) Return in about 3 months (around 8/8/2019) for follow up with ANNABEL Agudelo, if not sooner, review of symptoms, Compliance.        "

## 2019-05-08 NOTE — PATIENT INSTRUCTIONS
1) Continue autoCPAP and decrease pressure [id number] with 2L bleed in  2) Clean mask and supplies weekly and change them as insurance allows   3) Vaccines: Up to date with flu  4) Continue smoking cessation   5) Return in about 3 months (around 8/8/2019) for follow up with ANNABEL Agudelo, if not sooner, review of symptoms, Compliance.

## 2019-06-17 NOTE — PROGRESS NOTES
Subjective:      Nikki Aponte is a 46 y.o. female who presents with Cough and Otalgia            Cough  This is a new problem. Episode onset: Saturday 12/31/16. The problem has been gradually worsening. The problem occurs hourly. The cough is non-productive. Associated symptoms include chills, ear congestion, ear pain, a fever, myalgias, nasal congestion, postnasal drip, rhinorrhea, a sore throat, shortness of breath and sweats. Pertinent negatives include no chest pain, headaches, heartburn, hemoptysis, rash, weight loss or wheezing. The symptoms are aggravated by exercise and cold air. She has tried OTC cough suppressant (OTC decongestants) for the symptoms. The treatment provided mild relief. Her past medical history is significant for environmental allergies. There is no history of asthma, bronchiectasis, bronchitis, COPD, emphysema or pneumonia.   Otalgia   Associated symptoms include coughing, rhinorrhea and a sore throat. Pertinent negatives include no abdominal pain, diarrhea, ear discharge, headaches, hearing loss, neck pain, rash or vomiting.       Review of Systems   Constitutional: Positive for fever, chills and malaise/fatigue. Negative for weight loss and diaphoresis.   HENT: Positive for congestion, ear pain, postnasal drip, rhinorrhea and sore throat. Negative for ear discharge, hearing loss, nosebleeds and tinnitus.    Eyes: Negative for blurred vision.   Respiratory: Positive for cough, sputum production and shortness of breath. Negative for hemoptysis, wheezing and stridor.    Cardiovascular: Negative for chest pain, palpitations and orthopnea.   Gastrointestinal: Negative for heartburn, nausea, vomiting, abdominal pain, diarrhea and constipation.   Genitourinary: Negative for dysuria, urgency and frequency.   Musculoskeletal: Positive for myalgias. Negative for back pain and neck pain.   Skin: Negative for itching and rash.   Neurological: Negative for dizziness, tingling, tremors, loss of  "consciousness, weakness and headaches.   Endo/Heme/Allergies: Positive for environmental allergies.   Psychiatric/Behavioral: Negative for depression and suicidal ideas.          Objective:     /64 mmHg  Pulse 80  Temp(Src) 37.1 °C (98.8 °F)  Resp 14  Ht 1.676 m (5' 6\")  Wt 85.73 kg (189 lb)  BMI 30.52 kg/m2  SpO2 96%  LMP 11/05/2016  Breastfeeding? No     Physical Exam   Constitutional: She is oriented to person, place, and time. She appears well-developed and well-nourished. No distress. Face mask in place.   HENT:   Head: Normocephalic and atraumatic.   Right Ear: External ear normal. No lacerations. There is tenderness. No drainage or swelling. No foreign bodies. No mastoid tenderness. Tympanic membrane is injected, erythematous and bulging. Tympanic membrane is not scarred, not perforated and not retracted. A middle ear effusion is present. No hemotympanum. No decreased hearing is noted.   Left Ear: External ear normal. No lacerations. There is tenderness. No drainage or swelling. No foreign bodies. No mastoid tenderness. Tympanic membrane is injected, erythematous and bulging. Tympanic membrane is not scarred, not perforated and not retracted. A middle ear effusion is present. No hemotympanum. No decreased hearing is noted.   Mouth/Throat: Oropharyngeal exudate present.   Eyes: Conjunctivae are normal. Pupils are equal, round, and reactive to light. Right eye exhibits no discharge. Left eye exhibits no discharge.   Neck: Normal range of motion. No JVD present. No tracheal deviation present. No thyromegaly present.   Cardiovascular: Normal rate, regular rhythm and normal heart sounds.  Exam reveals no gallop and no friction rub.    No murmur heard.  Pulmonary/Chest: Effort normal. No stridor. No respiratory distress. She has no wheezes. She has no rales. She exhibits no tenderness.   Abdominal: Soft. She exhibits no distension. There is no tenderness.   Lymphadenopathy:     She has cervical " adenopathy.   Neurological: She is alert and oriented to person, place, and time. No cranial nerve deficit.   Skin: Skin is warm and dry. She is not diaphoretic. No erythema.   Psychiatric: She has a normal mood and affect. Her behavior is normal. Judgment and thought content normal.               Assessment/Plan:     1. Acute suppurative otitis media of both ears without spontaneous rupture of tympanic membranes, recurrence not specified  Omnicef 300 mg twice daily ×10 days  Tessalon Perles 100 mg up to 3 times daily as needed for cough  Patient encouraged to use over-the-counter decongestants as needed   Patient encouraged to use Mucinex over-the-counter as needed  Patient encouraged to use Flonase one spray each nostril daily until symptoms resolve  Patient encouraged to follow up if symptoms worsen or do not improve  Counseled patient regarding use of humidifier to improve congestion  Discussed use of over-the-counter Zyrtec as a milder decongestant  Discussed importance of completing entire course of prescribed an antibiotic  Patient did receive point-of-care flu test at this time result was negative    - REFERRAL TO ORTHOPEDICS  Patient also has history of carpal tunnel and was following up with orthopedics in the past. Patient does need a referral to orthopedics that she is interested in surgical options for carpal tunnel syndrome.         36.7

## 2019-08-22 ENCOUNTER — APPOINTMENT (RX ONLY)
Dept: URBAN - METROPOLITAN AREA CLINIC 325 | Facility: CLINIC | Age: 49
Setting detail: DERMATOLOGY
End: 2019-08-22

## 2019-08-22 DIAGNOSIS — L82.1 OTHER SEBORRHEIC KERATOSIS: ICD-10-CM

## 2019-08-22 DIAGNOSIS — D22 MELANOCYTIC NEVI: ICD-10-CM

## 2019-08-22 DIAGNOSIS — L72.0 EPIDERMAL CYST: ICD-10-CM

## 2019-08-22 DIAGNOSIS — L57.8 OTHER SKIN CHANGES DUE TO CHRONIC EXPOSURE TO NONIONIZING RADIATION: ICD-10-CM

## 2019-08-22 DIAGNOSIS — L81.4 OTHER MELANIN HYPERPIGMENTATION: ICD-10-CM

## 2019-08-22 DIAGNOSIS — R21 RASH AND OTHER NONSPECIFIC SKIN ERUPTION: ICD-10-CM

## 2019-08-22 PROBLEM — D22.9 MELANOCYTIC NEVI, UNSPECIFIED: Status: ACTIVE | Noted: 2019-08-22

## 2019-08-22 PROCEDURE — ? BIOPSY BY PUNCH METHOD

## 2019-08-22 PROCEDURE — ? ADDITIONAL NOTES

## 2019-08-22 PROCEDURE — ? PRESCRIPTION

## 2019-08-22 PROCEDURE — ? COUNSELING

## 2019-08-22 PROCEDURE — 11104 PUNCH BX SKIN SINGLE LESION: CPT

## 2019-08-22 PROCEDURE — 99203 OFFICE O/P NEW LOW 30 MIN: CPT | Mod: 25

## 2019-08-22 RX ORDER — HYDROXYZINE HYDROCHLORIDE 25 MG/1
TABLET, FILM COATED ORAL
Qty: 60 | Refills: 1 | Status: ERX | COMMUNITY
Start: 2019-08-22

## 2019-08-22 RX ORDER — PREDNISONE 10 MG/1
TABLET ORAL
Qty: 40 | Refills: 0 | Status: ERX | COMMUNITY
Start: 2019-08-22

## 2019-08-22 RX ORDER — CLOBETASOL PROPIONATE 0.5 MG/G
CREAM TOPICAL BID
Qty: 1 | Refills: 0 | Status: ERX | COMMUNITY
Start: 2019-08-22

## 2019-08-22 RX ORDER — DICAPRYLYL CARBONATE/DIMETH
SPRAY, NON-AEROSOL (ML) TOPICAL
Qty: 1 | Refills: 3 | Status: ERX | COMMUNITY
Start: 2019-08-22

## 2019-08-22 RX ADMIN — PREDNISONE: 10 TABLET ORAL at 20:59

## 2019-08-22 RX ADMIN — HYDROXYZINE HYDROCHLORIDE: 25 TABLET, FILM COATED ORAL at 20:59

## 2019-08-22 RX ADMIN — Medication: at 20:58

## 2019-08-22 RX ADMIN — CLOBETASOL PROPIONATE: 0.5 CREAM TOPICAL at 20:58

## 2019-08-22 ASSESSMENT — LOCATION SIMPLE DESCRIPTION DERM
LOCATION SIMPLE: UPPER BACK
LOCATION SIMPLE: LEFT CHEEK
LOCATION SIMPLE: LEFT THIGH

## 2019-08-22 ASSESSMENT — LOCATION ZONE DERM
LOCATION ZONE: LEG
LOCATION ZONE: TRUNK
LOCATION ZONE: FACE

## 2019-08-22 ASSESSMENT — LOCATION DETAILED DESCRIPTION DERM
LOCATION DETAILED: INFERIOR THORACIC SPINE
LOCATION DETAILED: LEFT INFERIOR MEDIAL MALAR CHEEK
LOCATION DETAILED: LEFT ANTERIOR PROXIMAL THIGH

## 2019-08-22 NOTE — PROCEDURE: MIPS QUALITY
Quality 226: Preventive Care And Screening: Tobacco Use: Screening And Cessation Intervention: Tobacco Screening not Performed for Unknown Reasons
Quality 47: Advance Care Plan: Advance care planning not documented, reason not otherwise specified.
Detail Level: Detailed
Quality 130: Documentation Of Current Medications In The Medical Record: Current Medications Documented
Quality 402: Tobacco Use And Help With Quitting Among Adolescents: Tobacco Screening OR Tobacco Cessation Intervention not Performed Reason Not Otherwise Specified
Quality 431: Preventive Care And Screening: Unhealthy Alcohol Use - Screening: Unhealthy alcohol use screening not performed, reason not otherwise specified

## 2019-08-22 NOTE — HPI: RASH
What Type Of Note Output Would You Prefer (Optional)?: Standard Output
How Severe Is Your Rash?: mild
Is This A New Presentation, Or A Follow-Up?: Rash
Additional History: PT HAS HAD THE RASH 6 WEEKS. HAS BEENSINCESTARTING USING CLOTRIMAZOLE AND BETAMETHASONE CREAM FOR A WEEK. RASH HAS GOTTEN WORSE SINCE STARTING CREAM

## 2019-08-22 NOTE — PROCEDURE: BIOPSY BY PUNCH METHOD
Consent: Written consent was obtained and risks were reviewed including but not limited to scarring, infection, bleeding, scabbing, incomplete removal, nerve damage and allergy to anesthesia.
Notification Instructions: Patient will be notified of biopsy results. However, patient instructed to call the office if not contacted within 2 weeks.
Home Suture Removal Text: Patient was provided a home suture removal kit and will remove their sutures at home.  If they have any questions or difficulties they will call the office.
Anesthesia Type: 1% lidocaine with epinephrine
Additional Anesthesia Volume In Cc (Will Not Render If 0): 0
Bill 32193 For Specimen Handling/Conveyance To Laboratory?: no
Punch Size In Mm: 5
Wound Care: Petrolatum
Anesthesia Volume In Cc (Will Not Render If 0): 0.5
Suture Removal: 14 days
Detail Level: Detailed
Epidermal Sutures: 3-0 Nylon
Lab: -60
Billing Type: Third-Party Bill
Dressing: bandage
Lab Facility: 3
Was A Bandage Applied: Yes
Hemostasis: None
Biopsy Type: H and E
Post-Care Instructions: I reviewed with the patient in detail post-care instructions. Patient is to keep the biopsy site dry overnight, and then apply bacitracin twice daily until healed. Patient may apply hydrogen peroxide soaks to remove any crusting.

## 2019-08-29 ENCOUNTER — RX ONLY (OUTPATIENT)
Age: 49
Setting detail: RX ONLY
End: 2019-08-29

## 2019-08-29 RX ORDER — CLOBETASOL PROPIONATE 0.5 MG/G
CREAM TOPICAL BID
Qty: 1 | Refills: 0 | Status: ERX

## 2019-09-04 ENCOUNTER — SLEEP CENTER VISIT (OUTPATIENT)
Dept: SLEEP MEDICINE | Facility: MEDICAL CENTER | Age: 49
End: 2019-09-04
Payer: COMMERCIAL

## 2019-09-04 VITALS
HEIGHT: 67 IN | HEART RATE: 86 BPM | WEIGHT: 149 LBS | OXYGEN SATURATION: 93 % | BODY MASS INDEX: 23.39 KG/M2 | DIASTOLIC BLOOD PRESSURE: 58 MMHG | SYSTOLIC BLOOD PRESSURE: 116 MMHG

## 2019-09-04 DIAGNOSIS — G47.33 OSA (OBSTRUCTIVE SLEEP APNEA): ICD-10-CM

## 2019-09-04 DIAGNOSIS — G47.34 NOCTURNAL HYPOXIA: ICD-10-CM

## 2019-09-04 DIAGNOSIS — J45.20 MILD INTERMITTENT ASTHMA WITHOUT COMPLICATION: ICD-10-CM

## 2019-09-04 PROCEDURE — 99214 OFFICE O/P EST MOD 30 MIN: CPT | Performed by: NURSE PRACTITIONER

## 2019-09-04 NOTE — PROGRESS NOTES
CC:  Here for f/u sleep and pulmonary issues as listed below    HPI:   Nikki presents today for follow up for TONY and asthma.  Hx of atrial fibrillation, requiring ablation.  History of Hodgkin's disease, breast cancer, depression, aortic valve insufficiency, Chronic anticoagulation taking Eliquis. She travels back and forth from [facility].    She recently started a new cardiac medication with a side effect of hives.  Will be seeing cardiology in a few days.  PFTs from 3/2017 indicate a Fev1 of 1.92L or 64% predicted, Fev1/FVC ratio of 72, DLCO 121%. She has not used rescue inhaler in many months.        She was originally diagnosed in 2015 with mild TONY with an AHI of 20.4 and low oxygenation of 75% she did not pursue treatment at that time and attempted to work on weight loss, but did not lose weight. Given ongoing symptoms she completed PSG 10/2017 which indicated an AHI of 15.1 that increases to 110 while in REM with low oxygenation of 68%.        Currently she is being treated with autoCPAP @ [id number] with 2L bleed in, adjusted last OV without benefit. 2 liter bleed in beneficial without morning H/A now. Compliance download from the dates 8/5/2019 - 9/3/2019 indicates she is wearing the device 100% for an avg of 5 hours and 26 minutes per night with a reduced AHI of 1.8.  She wakes up refreshed and is less tired throughout the day. Resolved naps.  She is going to bed at a more consistent time, 1a. sleep better overall. She will continue to clean supplies weekly and change them as insurance allows.             Patient Active Problem List    Diagnosis Date Noted   • TONY (obstructive sleep apnea)      Priority: Medium   • Paroxysmal atrial fibrillation (HCC) 12/22/2016     Priority: Medium   • Nonrheumatic aortic valve insufficiency 05/08/2014     Priority: Medium   • History of breast cancer 05/17/2017     Priority: Low   • S/P radiation therapy - 1990 Mantle Radiation      Priority: Low   • Allergic rhinitis due  "to allergen 05/08/2014     Priority: Low   • History of Hodgkin's disease 12/30/2013     Priority: Low   • Hypothyroidism 04/04/2013     Priority: Low   • Depression 04/04/2013     Priority: Low   • Nocturnal hypoxia 03/06/2019   • Mild intermittent asthma without complication 04/12/2018   • Cervical lymphadenopathy 12/29/2017   • S/P ablation of atrial fibrillation 12/26/2017   • Elevated LDL cholesterol level 12/22/2017   • Lymphadenopathy, axillary 12/22/2017   • Chronic anticoagulation 08/04/2017       Past Medical History:   Diagnosis Date   • Allergic rhinitis 4/4/2013   • Aortic sclerosis 5/8/2014   • Arrhythmia     \"Skips a beat\"   • Backpain    • Breast cancer, right (HCC) 4/2016   • Breath shortness     \"I have scar tissue in right lung from previous radiation, use my inhaler about once a week\"   • Bronchitis 2012   • Cancer (HCC) 1990    hodgkins, previous chemo, radiation    • Cancer (HCC) 2016    DCIS right breast lumpectomy radiation   • DCIS (ductal carcinoma in situ) 2015   • Depression 4/4/2013   • Endometriosis    • Heart murmur    • Hemorrhagic disorder (HCC)     on Eliquis   • Hodgkin disease (HCC)    • Hypothyroidism 4/4/2013   • Migraine variant 4/4/2013   • Paroxysmal atrial fibrillation (HCC)    • Psychiatric problem    • Sleep apnea     CPAP   • Valvular heart disease     moderate AI        Past Surgical History:   Procedure Laterality Date   • CARPAL TUNNEL RELEASE Right 4/21/2017    Procedure: CARPAL TUNNEL RELEASE;  Surgeon: Shay Powers M.D.;  Location: SURGERY [facility];  Service:    • CARPAL TUNNEL RELEASE Left 3/31/2017    Procedure: CARPAL TUNNEL RELEASE;  Surgeon: Shay Powers M.D.;  Location: SURGERY [facility];  Service:    • BREAST BIOPSY Right 5/25/2016    Procedure: BREAST BIOPSY FOR: RE-EXCISION OF RT BREAST TISSUE ;  Surgeon: Patricia Romero M.D.;  Location: SURGERY SAME DAY [facility];  Service:    • PARTIAL MASTECTOMY Right 5/10/2016    Procedure: " PARTIAL MASTECTOMY;  Surgeon: Patricia Romero M.D.;  Location: SURGERY SAME DAY [facility];  Service:    • US-NEEDLE CORE BX-BREAST PANEL         Family History   Problem Relation Age of Onset   • Stroke Mother    • Hypertension Mother    • Stroke Father    • Hypertension Father    • Cancer Father         prostate cancer   • Diabetes Neg Hx        Social History     Socioeconomic History   • Marital status: Single     Spouse name: Not on file   • Number of children: Not on file   • Years of education: Not on file   • Highest education level: Not on file   Occupational History   • Not on file   Social Needs   • Financial resource strain: Not on file   • Food insecurity:     Worry: Not on file     Inability: Not on file   • Transportation needs:     Medical: Not on file     Non-medical: Not on file   Tobacco Use   • Smoking status: Former Smoker     Packs/day: 0.25     Years: 1.00     Pack years: 0.25     Types: Cigarettes     Last attempt to quit: 3/29/1991     Years since quittin.4   • Smokeless tobacco: Never Used   Substance and Sexual Activity   • Alcohol use: No     Alcohol/week: 0.0 oz     Comment: rare    • Drug use: No   • Sexual activity: Yes     Birth control/protection: IUD   Lifestyle   • Physical activity:     Days per week: Not on file     Minutes per session: Not on file   • Stress: Not on file   Relationships   • Social connections:     Talks on phone: Not on file     Gets together: Not on file     Attends Mu-ism service: Not on file     Active member of club or organization: Not on file     Attends meetings of clubs or organizations: Not on file     Relationship status: Not on file   • Intimate partner violence:     Fear of current or ex partner: Not on file     Emotionally abused: Not on file     Physically abused: Not on file     Forced sexual activity: Not on file   Other Topics Concern   • Not on file   Social History Narrative   • Not on file       Current Outpatient Medications  "  Medication Sig Dispense Refill   • metoprolol SR (TOPROL XL) 25 MG TABLET SR 24 HR Take 1 Tab by mouth every day. 90 Tab 3   • sertraline (ZOLOFT) 100 MG Tab TAKE 1 TABLET BY MOUTH EVERY MORNING 90 Tab 2   • apixaban (ELIQUIS) 5mg Tab Take 1 Tab by mouth 2 Times a Day. 180 Tab 3   • levothyroxine (SYNTHROID) 112 MCG Tab Take 1 Tab by mouth Every morning on an empty stomach. 90 Tab 1   • Cetirizine HCl (ZYRTEC ALLERGY PO) Take  by mouth.     • diphenhydrAMINE (BENADRYL) 25 MG Tab Take 25 mg by mouth as needed for Sleep.     • albuterol (VENTOLIN HFA) 108 (90 BASE) MCG/ACT Aero Soln inhalation aerosol Inhale 2 Puffs by mouth every four hours as needed for Shortness of Breath (prn).       No current facility-administered medications for this visit.           Allergies: Codeine and Penicillins      ROS   Gen: Denies fever, chills, unintentional weight loss, fatigue, night sweats  E/N/T: Denies ear pain, nasal congestion  Resp:Denies Dyspnea, wheezing, cough, sputum production, hemoptysis  CV: Denies chest pain, chest tightness, palpitations, BLE edema  Sleep:Denies morning headache, insomnia, daytime somnolence, snoring, gasping for air, apnea  Neuro: Denies frequent headaches, weakness, dizziness  GI: Denies N/V, acid reflux/heartburn  See HPI.  All other systems reviewed and negative      Vital signs for this encounter:  Vitals:    09/04/19 1516   Height: 1.702 m (5' 7\")   Weight: 67.6 kg (149 lb)   Weight % change since last entry.: 0 %   BP: 116/58   Pulse: 86   BMI (Calculated): 23.34                 Physical Exam:   Appearance: well developed, well nourished, no acute distress.  Eyes: PERRL, EOM intact, sclere white, conjunctiva moist.  Ears: no lesions or deformities.  Hearing: grossly intact.  Nose: no lesions or deformities.  Dentition: good dentition.  Oropharynx: tongue normal, posterior pharynx without erythema or exudate.  Neck: supple, trachea midline, no masses.  Respiratory effort: no intercostal " retractions or use of accessory muscles.  Lung auscultation: Bilateral diminished   Heart auscultation: no murmur, rub, or gallop.   Extremities: no cyanosis or edema.  Abdomen: soft, non-tender, no masses.  Gait and station: grossly normal   Digits and Nails: no clubbing, cyanosis, petechiae, or nodes.  Cranial nerves: grossly normal.  Motor: no focal deficits observed.  Skin: no rashes, lesions, or ulcers noted.  Orientation: oriented to time, place, and person.  Mood and affect: mood and affect appropriate, normal interaction with examiner.    Assessment   1. TONY (obstructive sleep apnea)  DME Mask and Supplies    DME Other   2. Nocturnal hypoxia     3. Mild intermittent asthma without complication         Patient is clinically stable and will proceed with following plan.     PLAN:   Patient Instructions   1) Continue autoCPAP at [id number] with 2L bleed in  2) Clean mask and supplies weekly and change them as insurance allows   3) Vaccines: Up to date with flu  4) Continue smoking cessation   5) Return in about 1 year (around 9/4/2020) for if not sooner, review of symptoms, Compliance, follow up with ANNABEL Agudelo.

## 2019-09-04 NOTE — PATIENT INSTRUCTIONS
1) Continue autoCPAP at [id number] with 2L bleed in  2) Clean mask and supplies weekly and change them as insurance allows   3) Vaccines: Up to date with flu  4) Continue smoking cessation   5) Return in about 1 year (around 9/4/2020) for if not sooner, review of symptoms, Compliance, follow up with ANNABEL Agudelo.

## 2019-09-11 ENCOUNTER — TELEPHONE (OUTPATIENT)
Dept: PULMONOLOGY | Facility: HOSPICE | Age: 49
End: 2019-09-11

## 2019-09-11 ENCOUNTER — APPOINTMENT (RX ONLY)
Dept: URBAN - METROPOLITAN AREA CLINIC 325 | Facility: CLINIC | Age: 49
Setting detail: DERMATOLOGY
End: 2019-09-11

## 2019-09-11 DIAGNOSIS — L57.8 OTHER SKIN CHANGES DUE TO CHRONIC EXPOSURE TO NONIONIZING RADIATION: ICD-10-CM

## 2019-09-11 DIAGNOSIS — L259 CONTACT DERMATITIS AND OTHER ECZEMA, UNSPECIFIED CAUSE: ICD-10-CM

## 2019-09-11 DIAGNOSIS — L81.4 OTHER MELANIN HYPERPIGMENTATION: ICD-10-CM

## 2019-09-11 DIAGNOSIS — L30.8 OTHER SPECIFIED DERMATITIS: ICD-10-CM

## 2019-09-11 PROBLEM — L23.9 ALLERGIC CONTACT DERMATITIS, UNSPECIFIED CAUSE: Status: ACTIVE | Noted: 2019-09-11

## 2019-09-11 PROCEDURE — 95044 PATCH/APPLICATION TESTS: CPT

## 2019-09-11 PROCEDURE — ? COUNSELING

## 2019-09-11 PROCEDURE — ? ORDER TESTS

## 2019-09-11 PROCEDURE — 99213 OFFICE O/P EST LOW 20 MIN: CPT | Mod: 25

## 2019-09-11 PROCEDURE — ? PATCH TESTING

## 2019-09-11 ASSESSMENT — LOCATION DETAILED DESCRIPTION DERM
LOCATION DETAILED: SUPERIOR LUMBAR SPINE
LOCATION DETAILED: RIGHT SUPERIOR UPPER BACK
LOCATION DETAILED: LEFT ANTERIOR PROXIMAL THIGH
LOCATION DETAILED: LEFT SUPERIOR LATERAL MIDBACK
LOCATION DETAILED: PERIUMBILICAL SKIN
LOCATION DETAILED: LEFT INFERIOR UPPER BACK

## 2019-09-11 ASSESSMENT — LOCATION SIMPLE DESCRIPTION DERM
LOCATION SIMPLE: LEFT LOWER BACK
LOCATION SIMPLE: LOWER BACK
LOCATION SIMPLE: LEFT UPPER BACK
LOCATION SIMPLE: LEFT THIGH
LOCATION SIMPLE: ABDOMEN
LOCATION SIMPLE: RIGHT UPPER BACK

## 2019-09-11 ASSESSMENT — LOCATION ZONE DERM
LOCATION ZONE: TRUNK
LOCATION ZONE: LEG

## 2019-09-11 NOTE — PROCEDURE: MIPS QUALITY
Quality 226: Preventive Care And Screening: Tobacco Use: Screening And Cessation Intervention: Tobacco Screening not Performed for Unknown Reasons
Quality 137: Melanoma: Continuity Of Care - Recall System: Recall system not utilized, reason not otherwise specified
Quality 47: Advance Care Plan: Advance care planning not documented, reason not otherwise specified.
Detail Level: Detailed
Quality 130: Documentation Of Current Medications In The Medical Record: Current Medications Documented
Quality 410: Psoriasis Clinical Response To Oral Systemic Or Biologic Mediations: Psoriasis Assessment Tool NOT Documented
Quality 402: Tobacco Use And Help With Quitting Among Adolescents: Patient screened for tobacco and never smoked
Quality 138: Melanoma: Coordination Of Care: Treatment plan not communicated, reason not otherwise specified.
Quality 46: Medication Reconciliation: For eligible patients only (patients seen within 30 days from discharge) Were discharged medications reconciled with the current medication list in their medication record within 30 days of discharge from the inpatient facility?
Quality 431: Preventive Care And Screening: Unhealthy Alcohol Use - Screening: Unhealthy alcohol use screening not performed, reason not otherwise specified
Quality 337: Tuberculosis Prevention For Psoriasis And Psoriatic Arthritis Patients On A Biological Immune Response Modifier: No documentation of negative or managed positive TB screen

## 2019-09-13 ENCOUNTER — APPOINTMENT (RX ONLY)
Dept: URBAN - METROPOLITAN AREA CLINIC 325 | Facility: CLINIC | Age: 49
Setting detail: DERMATOLOGY
End: 2019-09-13

## 2019-09-13 DIAGNOSIS — L259 CONTACT DERMATITIS AND OTHER ECZEMA, UNSPECIFIED CAUSE: ICD-10-CM

## 2019-09-13 PROBLEM — L23.9 ALLERGIC CONTACT DERMATITIS, UNSPECIFIED CAUSE: Status: ACTIVE | Noted: 2019-09-13

## 2019-09-13 PROCEDURE — 99213 OFFICE O/P EST LOW 20 MIN: CPT

## 2019-09-13 PROCEDURE — ? TRUE TEST READING

## 2019-09-13 PROCEDURE — ? ADDITIONAL NOTES

## 2019-09-13 NOTE — PROCEDURE: ADDITIONAL NOTES
Detail Level: Simple
Additional Notes: CONTINUE RX THAT WAS PRESCRIBED AND BENADRYL OTC WHEN NEEDED.

## 2020-03-12 ENCOUNTER — APPOINTMENT (RX ONLY)
Dept: URBAN - METROPOLITAN AREA CLINIC 325 | Facility: CLINIC | Age: 50
Setting detail: DERMATOLOGY
End: 2020-03-12

## 2020-03-12 DIAGNOSIS — L259 CONTACT DERMATITIS AND OTHER ECZEMA, UNSPECIFIED CAUSE: ICD-10-CM

## 2020-03-12 DIAGNOSIS — L91.0 HYPERTROPHIC SCAR: ICD-10-CM

## 2020-03-12 DIAGNOSIS — D22 MELANOCYTIC NEVI: ICD-10-CM

## 2020-03-12 DIAGNOSIS — L82.1 OTHER SEBORRHEIC KERATOSIS: ICD-10-CM

## 2020-03-12 DIAGNOSIS — D18.0 HEMANGIOMA: ICD-10-CM

## 2020-03-12 DIAGNOSIS — L57.8 OTHER SKIN CHANGES DUE TO CHRONIC EXPOSURE TO NONIONIZING RADIATION: ICD-10-CM

## 2020-03-12 DIAGNOSIS — L81.4 OTHER MELANIN HYPERPIGMENTATION: ICD-10-CM

## 2020-03-12 PROBLEM — L23.9 ALLERGIC CONTACT DERMATITIS, UNSPECIFIED CAUSE: Status: ACTIVE | Noted: 2020-03-12

## 2020-03-12 PROBLEM — D18.01 HEMANGIOMA OF SKIN AND SUBCUTANEOUS TISSUE: Status: ACTIVE | Noted: 2020-03-12

## 2020-03-12 PROBLEM — D22.9 MELANOCYTIC NEVI, UNSPECIFIED: Status: ACTIVE | Noted: 2020-03-12

## 2020-03-12 PROBLEM — D22.5 MELANOCYTIC NEVI OF TRUNK: Status: ACTIVE | Noted: 2020-03-12

## 2020-03-12 PROCEDURE — ? COUNSELING

## 2020-03-12 PROCEDURE — 99213 OFFICE O/P EST LOW 20 MIN: CPT

## 2020-03-12 PROCEDURE — ? FULL BODY SKIN EXAM

## 2020-03-12 PROCEDURE — ? SUNSCREEN RECOMMENDATIONS

## 2020-03-12 PROCEDURE — ? ADDITIONAL NOTES

## 2020-03-12 ASSESSMENT — LOCATION DETAILED DESCRIPTION DERM
LOCATION DETAILED: RIGHT MID-UPPER BACK
LOCATION DETAILED: RIGHT SUPERIOR MEDIAL UPPER BACK
LOCATION DETAILED: LEFT ANTERIOR PROXIMAL THIGH
LOCATION DETAILED: SUPERIOR LUMBAR SPINE

## 2020-03-12 ASSESSMENT — LOCATION ZONE DERM
LOCATION ZONE: LEG
LOCATION ZONE: TRUNK

## 2020-03-12 ASSESSMENT — LOCATION SIMPLE DESCRIPTION DERM
LOCATION SIMPLE: RIGHT UPPER BACK
LOCATION SIMPLE: LEFT THIGH
LOCATION SIMPLE: LOWER BACK

## 2020-03-12 NOTE — PROCEDURE: ADDITIONAL NOTES
sob palpitations dizziness started at 9 am. with cough denies fever/ chills, hx 2 stents
Additional Notes: PT ALLERGIC TO GOLD SODIUM THIOSULFATE
Detail Level: Zone
Additional Notes: SAMPLES OF CORDRAN GIVEN TO PT.

## 2020-03-12 NOTE — PROCEDURE: MIPS QUALITY
Quality 130: Documentation Of Current Medications In The Medical Record: Current Medications Documented
Quality 410: Psoriasis Clinical Response To Oral Systemic Or Biologic Mediations: Psoriasis Assessment Tool NOT Documented
Quality 226: Preventive Care And Screening: Tobacco Use: Screening And Cessation Intervention: Tobacco Screening not Performed for Medical Reasons
Detail Level: Detailed
Quality 47: Advance Care Plan: Advance Care Planning discussed and documented; advance care plan or surrogate decision maker documented in the medical record.
Quality 337: Tuberculosis Prevention For Psoriasis And Psoriatic Arthritis Patients On A Biological Immune Response Modifier: No documentation of negative or managed positive TB screen
Quality 431: Preventive Care And Screening: Unhealthy Alcohol Use - Screening: Unhealthy alcohol use screening not performed, reason not otherwise specified
Quality 402: Tobacco Use And Help With Quitting Among Adolescents: Tobacco Screening OR Tobacco Cessation Intervention not Performed Reason Not Otherwise Specified
Quality 138: Melanoma: Coordination Of Care: Treatment plan not communicated, reason not otherwise specified.
Quality 137: Melanoma: Continuity Of Care - Recall System: Recall system not utilized, reason not otherwise specified
Quality 46: Medication Reconciliation: For eligible patients only (patients seen within 30 days from discharge) Were discharged medications reconciled with the current medication list in their medication record within 30 days of discharge from the inpatient facility?

## 2022-07-19 ENCOUNTER — APPOINTMENT (RX ONLY)
Dept: URBAN - METROPOLITAN AREA CLINIC 325 | Facility: CLINIC | Age: 52
Setting detail: DERMATOLOGY
End: 2022-07-19

## 2022-07-19 DIAGNOSIS — L29.8 OTHER PRURITUS: ICD-10-CM

## 2022-07-19 DIAGNOSIS — R21 RASH AND OTHER NONSPECIFIC SKIN ERUPTION: ICD-10-CM | Status: WORSENING

## 2022-07-19 DIAGNOSIS — L85.3 XEROSIS CUTIS: ICD-10-CM

## 2022-07-19 DIAGNOSIS — L29.89 OTHER PRURITUS: ICD-10-CM

## 2022-07-19 PROCEDURE — ? PRESCRIPTION

## 2022-07-19 PROCEDURE — 99213 OFFICE O/P EST LOW 20 MIN: CPT

## 2022-07-19 PROCEDURE — ? COUNSELING

## 2022-07-19 PROCEDURE — ? FULL BODY SKIN EXAM - DECLINED

## 2022-07-19 RX ORDER — TRIAMCINOLONE ACETONIDE 0.25 MG/G
CREAM TOPICAL
Qty: 15 | Refills: 0 | Status: ERX | COMMUNITY
Start: 2022-07-19

## 2022-07-19 RX ADMIN — TRIAMCINOLONE ACETONIDE: 0.25 CREAM TOPICAL at 00:00

## 2022-07-19 ASSESSMENT — LOCATION DETAILED DESCRIPTION DERM
LOCATION DETAILED: LEFT INFERIOR ANTERIOR NECK
LOCATION DETAILED: LEFT INFERIOR LATERAL NECK

## 2022-07-19 ASSESSMENT — LOCATION SIMPLE DESCRIPTION DERM: LOCATION SIMPLE: LEFT ANTERIOR NECK

## 2022-07-19 ASSESSMENT — LOCATION ZONE DERM: LOCATION ZONE: NECK

## 2023-07-27 ENCOUNTER — APPOINTMENT (RX ONLY)
Dept: URBAN - METROPOLITAN AREA CLINIC 325 | Facility: CLINIC | Age: 53
Setting detail: DERMATOLOGY
End: 2023-07-27

## 2023-07-27 DIAGNOSIS — L82.1 OTHER SEBORRHEIC KERATOSIS: ICD-10-CM

## 2023-07-27 DIAGNOSIS — L81.4 OTHER MELANIN HYPERPIGMENTATION: ICD-10-CM

## 2023-07-27 DIAGNOSIS — D22 MELANOCYTIC NEVI: ICD-10-CM

## 2023-07-27 PROBLEM — D22.5 MELANOCYTIC NEVI OF TRUNK: Status: ACTIVE | Noted: 2023-07-27

## 2023-07-27 PROBLEM — D22.61 MELANOCYTIC NEVI OF RIGHT UPPER LIMB, INCLUDING SHOULDER: Status: ACTIVE | Noted: 2023-07-27

## 2023-07-27 PROCEDURE — ? FULL BODY SKIN EXAM

## 2023-07-27 PROCEDURE — ? COUNSELING

## 2023-07-27 PROCEDURE — 99213 OFFICE O/P EST LOW 20 MIN: CPT

## 2023-07-27 PROCEDURE — ? SUNSCREEN RECOMMENDATIONS

## 2023-07-27 ASSESSMENT — LOCATION SIMPLE DESCRIPTION DERM
LOCATION SIMPLE: UPPER BACK
LOCATION SIMPLE: LEFT UPPER BACK
LOCATION SIMPLE: RIGHT FOREARM

## 2023-07-27 ASSESSMENT — LOCATION DETAILED DESCRIPTION DERM
LOCATION DETAILED: LEFT SUPERIOR UPPER BACK
LOCATION DETAILED: SUPERIOR THORACIC SPINE
LOCATION DETAILED: LEFT MID-UPPER BACK
LOCATION DETAILED: RIGHT VENTRAL PROXIMAL FOREARM

## 2023-07-27 ASSESSMENT — LOCATION ZONE DERM
LOCATION ZONE: ARM
LOCATION ZONE: TRUNK

## 2024-07-25 ENCOUNTER — APPOINTMENT (RX ONLY)
Dept: URBAN - METROPOLITAN AREA CLINIC 325 | Facility: CLINIC | Age: 54
Setting detail: DERMATOLOGY
End: 2024-07-25

## 2024-07-25 DIAGNOSIS — L82.1 OTHER SEBORRHEIC KERATOSIS: ICD-10-CM

## 2024-07-25 DIAGNOSIS — L81.4 OTHER MELANIN HYPERPIGMENTATION: ICD-10-CM | Status: WELL CONTROLLED

## 2024-07-25 DIAGNOSIS — D18.0 HEMANGIOMA: ICD-10-CM

## 2024-07-25 DIAGNOSIS — D22 MELANOCYTIC NEVI: ICD-10-CM

## 2024-07-25 DIAGNOSIS — D485 NEOPLASM OF UNCERTAIN BEHAVIOR OF SKIN: ICD-10-CM | Status: WORSENING

## 2024-07-25 PROBLEM — D18.01 HEMANGIOMA OF SKIN AND SUBCUTANEOUS TISSUE: Status: ACTIVE | Noted: 2024-07-25

## 2024-07-25 PROBLEM — D22.9 MELANOCYTIC NEVI, UNSPECIFIED: Status: ACTIVE | Noted: 2024-07-25

## 2024-07-25 PROBLEM — D48.5 NEOPLASM OF UNCERTAIN BEHAVIOR OF SKIN: Status: ACTIVE | Noted: 2024-07-25

## 2024-07-25 PROCEDURE — ? COUNSELING

## 2024-07-25 PROCEDURE — ? SUNSCREEN RECOMMENDATIONS

## 2024-07-25 PROCEDURE — 11102 TANGNTL BX SKIN SINGLE LES: CPT

## 2024-07-25 PROCEDURE — ? BIOPSY BY SHAVE METHOD

## 2024-07-25 PROCEDURE — ? FULL BODY SKIN EXAM

## 2024-07-25 PROCEDURE — 99213 OFFICE O/P EST LOW 20 MIN: CPT | Mod: 25

## 2024-07-25 ASSESSMENT — LOCATION SIMPLE DESCRIPTION DERM: LOCATION SIMPLE: RIGHT UPPER BACK

## 2024-07-25 ASSESSMENT — LOCATION DETAILED DESCRIPTION DERM
LOCATION DETAILED: RIGHT MID-UPPER BACK
LOCATION DETAILED: RIGHT SUPERIOR MEDIAL UPPER BACK

## 2024-07-25 ASSESSMENT — LOCATION ZONE DERM: LOCATION ZONE: TRUNK

## 2024-08-22 ENCOUNTER — APPOINTMENT (RX ONLY)
Dept: URBAN - METROPOLITAN AREA CLINIC 325 | Facility: CLINIC | Age: 54
Setting detail: DERMATOLOGY
End: 2024-08-22

## 2024-08-22 DIAGNOSIS — L81.4 OTHER MELANIN HYPERPIGMENTATION: ICD-10-CM

## 2024-08-22 DIAGNOSIS — D22 MELANOCYTIC NEVI: ICD-10-CM

## 2024-08-22 DIAGNOSIS — L81.0 POSTINFLAMMATORY HYPERPIGMENTATION: ICD-10-CM

## 2024-08-22 PROBLEM — D22.5 MELANOCYTIC NEVI OF TRUNK: Status: ACTIVE | Noted: 2024-08-22

## 2024-08-22 PROCEDURE — ? SUNSCREEN RECOMMENDATIONS

## 2024-08-22 PROCEDURE — ? FULL BODY SKIN EXAM - DECLINED

## 2024-08-22 PROCEDURE — ? COUNSELING

## 2024-08-22 PROCEDURE — 99213 OFFICE O/P EST LOW 20 MIN: CPT

## 2024-08-22 ASSESSMENT — LOCATION SIMPLE DESCRIPTION DERM
LOCATION SIMPLE: CHEST
LOCATION SIMPLE: LEFT UPPER BACK
LOCATION SIMPLE: RIGHT UPPER BACK

## 2024-08-22 ASSESSMENT — LOCATION DETAILED DESCRIPTION DERM
LOCATION DETAILED: RIGHT MID-UPPER BACK
LOCATION DETAILED: MIDDLE STERNUM
LOCATION DETAILED: LEFT SUPERIOR UPPER BACK

## 2024-08-22 ASSESSMENT — LOCATION ZONE DERM: LOCATION ZONE: TRUNK

## 2025-05-27 NOTE — PROGRESS NOTES
Note sent to md on call to notify of night time bp of 91/57.    Pt arrived to unit. Admitting notified. Orders received via fax from Dr. Sharma. Consent to treat signed. Tele box in place

## 2025-08-21 ENCOUNTER — APPOINTMENT (OUTPATIENT)
Dept: URBAN - METROPOLITAN AREA CLINIC 325 | Facility: CLINIC | Age: 55
Setting detail: DERMATOLOGY
End: 2025-08-21

## 2025-08-21 DIAGNOSIS — D22 MELANOCYTIC NEVI: ICD-10-CM | Status: STABLE

## 2025-08-21 DIAGNOSIS — L72.0 EPIDERMAL CYST: ICD-10-CM | Status: INADEQUATELY CONTROLLED

## 2025-08-21 DIAGNOSIS — L81.4 OTHER MELANIN HYPERPIGMENTATION: ICD-10-CM | Status: STABLE

## 2025-08-21 DIAGNOSIS — D18.0 HEMANGIOMA: ICD-10-CM | Status: STABLE

## 2025-08-21 DIAGNOSIS — L82.1 OTHER SEBORRHEIC KERATOSIS: ICD-10-CM | Status: STABLE

## 2025-08-21 PROBLEM — D22.62 MELANOCYTIC NEVI OF LEFT UPPER LIMB, INCLUDING SHOULDER: Status: ACTIVE | Noted: 2025-08-21

## 2025-08-21 PROBLEM — D22.5 MELANOCYTIC NEVI OF TRUNK: Status: ACTIVE | Noted: 2025-08-21

## 2025-08-21 PROBLEM — D22.72 MELANOCYTIC NEVI OF LEFT LOWER LIMB, INCLUDING HIP: Status: ACTIVE | Noted: 2025-08-21

## 2025-08-21 PROBLEM — D22.61 MELANOCYTIC NEVI OF RIGHT UPPER LIMB, INCLUDING SHOULDER: Status: ACTIVE | Noted: 2025-08-21

## 2025-08-21 PROBLEM — D22.39 MELANOCYTIC NEVI OF OTHER PARTS OF FACE: Status: ACTIVE | Noted: 2025-08-21

## 2025-08-21 PROBLEM — D22.71 MELANOCYTIC NEVI OF RIGHT LOWER LIMB, INCLUDING HIP: Status: ACTIVE | Noted: 2025-08-21

## 2025-08-21 PROBLEM — D18.01 HEMANGIOMA OF SKIN AND SUBCUTANEOUS TISSUE: Status: ACTIVE | Noted: 2025-08-21

## 2025-08-21 PROCEDURE — ? ADDITIONAL NOTES

## 2025-08-21 PROCEDURE — ? TREATMENT REGIMEN

## 2025-08-21 PROCEDURE — ? COUNSELING

## 2025-08-21 ASSESSMENT — LOCATION DETAILED DESCRIPTION DERM
LOCATION DETAILED: EPIGASTRIC SKIN
LOCATION DETAILED: LEFT LATERAL BREAST 5-6:00 REGION
LOCATION DETAILED: RIGHT AXILLARY VAULT
LOCATION DETAILED: LEFT DISTAL PRETIBIAL REGION
LOCATION DETAILED: LEFT SUPERIOR LATERAL BUCCAL CHEEK
LOCATION DETAILED: INFERIOR THORACIC SPINE
LOCATION DETAILED: LEFT MEDIAL BREAST 7-8:00 REGION
LOCATION DETAILED: RIGHT LOWER CUTANEOUS LIP
LOCATION DETAILED: LEFT PROXIMAL POSTERIOR UPPER ARM
LOCATION DETAILED: LEFT MEDIAL PROXIMAL PRETIBIAL REGION
LOCATION DETAILED: RIGHT MEDIAL PROXIMAL PRETIBIAL REGION
LOCATION DETAILED: RIGHT LATERAL BUCCAL CHEEK
LOCATION DETAILED: LEFT MID-UPPER BACK
LOCATION DETAILED: LEFT SUPERIOR LATERAL LOWER BACK
LOCATION DETAILED: RIGHT VENTRAL PROXIMAL FOREARM
LOCATION DETAILED: PERIUMBILICAL SKIN
LOCATION DETAILED: RIGHT PROXIMAL POSTERIOR UPPER ARM
LOCATION DETAILED: RIGHT INFERIOR LATERAL MALAR CHEEK
LOCATION DETAILED: RIGHT PROXIMAL DORSAL FOREARM
LOCATION DETAILED: RIGHT SUPERIOR LATERAL UPPER BACK
LOCATION DETAILED: LEFT PROXIMAL DORSAL FOREARM
LOCATION DETAILED: MIDDLE STERNUM

## 2025-08-21 ASSESSMENT — LOCATION SIMPLE DESCRIPTION DERM
LOCATION SIMPLE: RIGHT FOREARM
LOCATION SIMPLE: UPPER BACK
LOCATION SIMPLE: LEFT LOWER BACK
LOCATION SIMPLE: LEFT FOREARM
LOCATION SIMPLE: RIGHT AXILLARY VAULT
LOCATION SIMPLE: RIGHT PRETIBIAL REGION
LOCATION SIMPLE: CHEST
LOCATION SIMPLE: LEFT BREAST
LOCATION SIMPLE: ABDOMEN
LOCATION SIMPLE: LEFT UPPER BACK
LOCATION SIMPLE: LEFT PRETIBIAL REGION
LOCATION SIMPLE: RIGHT CHEEK
LOCATION SIMPLE: LEFT CHEEK
LOCATION SIMPLE: RIGHT LIP
LOCATION SIMPLE: LEFT POSTERIOR UPPER ARM
LOCATION SIMPLE: RIGHT BACK
LOCATION SIMPLE: RIGHT POSTERIOR UPPER ARM

## 2025-08-21 ASSESSMENT — LOCATION ZONE DERM
LOCATION ZONE: TRUNK
LOCATION ZONE: LEG
LOCATION ZONE: AXILLAE
LOCATION ZONE: ARM
LOCATION ZONE: LIP
LOCATION ZONE: FACE

## (undated) DEVICE — SUTURE 3-0 ETHILON FS-1 - (36/BX) 30 INCH

## (undated) DEVICE — BANDAGE ROLL STERILE BULKEE 4.5 IN X 4 YD (100EA/CA)

## (undated) DEVICE — GLOVE BIOGEL SZ 8 SURGICAL PF LTX - (50PR/BX 4BX/CA)

## (undated) DEVICE — BLADE SURGICAL #15 - (50/BX 3BX/CA)

## (undated) DEVICE — LACTATED RINGERS INJ 1000 ML - (14EA/CA 60CA/PF)

## (undated) DEVICE — TOURNIQUET CUFF 18 X 3 ONE PORT DISP - STERILE (10/BX)

## (undated) DEVICE — PACK LOWER EXTREMITY - (2/CA)

## (undated) DEVICE — KIT ROOM DECONTAMINATION

## (undated) DEVICE — TRAY SRGPRP PVP IOD WT PRP - (20/CA)

## (undated) DEVICE — BLADE INDIANATOME

## (undated) DEVICE — DRAPE LARGE 3 QUARTER - (20/CA)

## (undated) DEVICE — SODIUM CHL IRRIGATION 0.9% 1000ML (12EA/CA)

## (undated) DEVICE — GLOVE BIOGEL INDICATOR SZ 7.5 SURGICAL PF LTX - (50PR/BX 4BX/CA)

## (undated) DEVICE — BANDAGE STER SOF-FORM 4X75IN - (12EA/BX 8BX/CA)

## (undated) DEVICE — BANDAGE ELASTIC LATEX STERILE VELCRO 4 X 5 YDS (25EA/CA)

## (undated) DEVICE — SLEEVE, SEQUENTIAL CALF REG

## (undated) DEVICE — SPONGE GAUZE STER 4X4 8-PL - (2/PK 50PK/BX 12BX/CS)

## (undated) DEVICE — MASK AIRWAY SIZE 4 UNIQUE SILICON (10EA/BX)

## (undated) DEVICE — COVER LIGHT HANDLE FLEXIBLE - SOFT (2EA/PK 80PK/CA)

## (undated) DEVICE — SUTURE 3-0 ETHILON PS-1 (36PK/BX)

## (undated) DEVICE — PADDING CAST 4 IN STERILE - 4 X 4 YDS (24/CA)

## (undated) DEVICE — GLOVE BIOGEL PI ULTRATOUCH SZ 7.5 SURGICAL PF LF -(50/BX 4BX/CA)

## (undated) DEVICE — MASK, LARYNGEAL AIRWAY #4

## (undated) DEVICE — PAD PREP 24 X 48 CUFFED - (100/CA)

## (undated) DEVICE — SUTURE GENERAL

## (undated) DEVICE — GLOVE, LITE (PAIR)

## (undated) DEVICE — ELECTRODE DUAL RETURN W/ CORD - (50/PK)

## (undated) DEVICE — DRESSING XEROFORM 1X8 - (50/BX 4BX/CA)

## (undated) DEVICE — GLOVE BIOGEL PI INDICATOR SZ 7.5 SURGICAL PF LF -(50/BX 4BX/CA)

## (undated) DEVICE — GLOVE BIOGEL SZ 7.5 SURGICAL PF LTX - (50PR/BX 4BX/CA)

## (undated) DEVICE — BANDAGE ELASTIC 4 IN X 5 YDS - LATEX FREE(10/BX 5BX/CA)